# Patient Record
Sex: FEMALE | Race: WHITE | Employment: OTHER
[De-identification: names, ages, dates, MRNs, and addresses within clinical notes are randomized per-mention and may not be internally consistent; named-entity substitution may affect disease eponyms.]

---

## 2017-03-07 ENCOUNTER — OFFICE VISIT (OUTPATIENT)
Dept: GASTROENTEROLOGY | Facility: CLINIC | Age: 72
End: 2017-03-07

## 2017-03-07 VITALS
SYSTOLIC BLOOD PRESSURE: 109 MMHG | TEMPERATURE: 98.1 F | DIASTOLIC BLOOD PRESSURE: 56 MMHG | HEART RATE: 74 BPM | OXYGEN SATURATION: 100 %

## 2017-03-07 DIAGNOSIS — R19.5 GUAIAC POSITIVE STOOLS: ICD-10-CM

## 2017-03-07 DIAGNOSIS — R19.5 POSITIVE FIT (FECAL IMMUNOCHEMICAL TEST): Primary | ICD-10-CM

## 2017-03-07 PROCEDURE — G8400 PT W/DXA NO RESULTS DOC: HCPCS | Performed by: INTERNAL MEDICINE

## 2017-03-07 PROCEDURE — 3017F COLORECTAL CA SCREEN DOC REV: CPT | Performed by: INTERNAL MEDICINE

## 2017-03-07 PROCEDURE — 4040F PNEUMOC VAC/ADMIN/RCVD: CPT | Performed by: INTERNAL MEDICINE

## 2017-03-07 PROCEDURE — G8484 FLU IMMUNIZE NO ADMIN: HCPCS | Performed by: INTERNAL MEDICINE

## 2017-03-07 PROCEDURE — 1123F ACP DISCUSS/DSCN MKR DOCD: CPT | Performed by: INTERNAL MEDICINE

## 2017-03-07 PROCEDURE — G8421 BMI NOT CALCULATED: HCPCS | Performed by: INTERNAL MEDICINE

## 2017-03-07 PROCEDURE — 1090F PRES/ABSN URINE INCON ASSESS: CPT | Performed by: INTERNAL MEDICINE

## 2017-03-07 PROCEDURE — G8599 NO ASA/ANTIPLAT THER USE RNG: HCPCS | Performed by: INTERNAL MEDICINE

## 2017-03-07 PROCEDURE — 1036F TOBACCO NON-USER: CPT | Performed by: INTERNAL MEDICINE

## 2017-03-07 PROCEDURE — 99205 OFFICE O/P NEW HI 60 MIN: CPT | Performed by: INTERNAL MEDICINE

## 2017-03-07 PROCEDURE — 3014F SCREEN MAMMO DOC REV: CPT | Performed by: INTERNAL MEDICINE

## 2017-03-07 PROCEDURE — G8428 CUR MEDS NOT DOCUMENT: HCPCS | Performed by: INTERNAL MEDICINE

## 2017-03-07 ASSESSMENT — ENCOUNTER SYMPTOMS
BLOOD IN STOOL: 1
CONSTIPATION: 0
BACK PAIN: 0
RECTAL PAIN: 0
DIARRHEA: 0
SINUS PRESSURE: 1
ABDOMINAL DISTENTION: 0
ABDOMINAL PAIN: 0
ANAL BLEEDING: 0
SHORTNESS OF BREATH: 0
VOMITING: 0
NAUSEA: 0
CHOKING: 0
COUGH: 0
EYES NEGATIVE: 1

## 2017-03-17 ENCOUNTER — ANESTHESIA EVENT (OUTPATIENT)
Dept: OPERATING ROOM | Age: 72
End: 2017-03-17
Payer: MEDICARE

## 2017-03-20 ENCOUNTER — HOSPITAL ENCOUNTER (OUTPATIENT)
Age: 72
Setting detail: OUTPATIENT SURGERY
Discharge: HOME | End: 2017-03-20
Attending: INTERNAL MEDICINE | Admitting: INTERNAL MEDICINE
Payer: MEDICARE

## 2017-03-20 ENCOUNTER — ANESTHESIA (OUTPATIENT)
Dept: OPERATING ROOM | Age: 72
End: 2017-03-20
Payer: MEDICARE

## 2017-03-20 VITALS
BODY MASS INDEX: 50.41 KG/M2 | HEART RATE: 74 BPM | HEIGHT: 61 IN | SYSTOLIC BLOOD PRESSURE: 110 MMHG | WEIGHT: 267 LBS | DIASTOLIC BLOOD PRESSURE: 62 MMHG | RESPIRATION RATE: 18 BRPM | OXYGEN SATURATION: 100 % | TEMPERATURE: 97.5 F

## 2017-03-20 VITALS — SYSTOLIC BLOOD PRESSURE: 150 MMHG | DIASTOLIC BLOOD PRESSURE: 70 MMHG | OXYGEN SATURATION: 97 % | TEMPERATURE: 96.8 F

## 2017-03-20 LAB
ABSOLUTE EOS #: 0.3 K/UL (ref 0–0.4)
ABSOLUTE LYMPH #: 1.7 K/UL (ref 1–4.8)
ABSOLUTE MONO #: 0.5 K/UL (ref 0.1–1.3)
ANION GAP SERPL CALCULATED.3IONS-SCNC: 8 MMOL/L (ref 9–17)
BASOPHILS # BLD: 0 % (ref 0–2)
BASOPHILS ABSOLUTE: 0 K/UL (ref 0–0.2)
BUN BLDV-MCNC: 14 MG/DL (ref 8–23)
BUN/CREAT BLD: ABNORMAL (ref 9–20)
CALCIUM SERPL-MCNC: 9.6 MG/DL (ref 8.6–10.4)
CHLORIDE BLD-SCNC: 96 MMOL/L (ref 98–107)
CO2: 37 MMOL/L (ref 20–31)
CREAT SERPL-MCNC: 0.86 MG/DL (ref 0.5–0.9)
DIFFERENTIAL TYPE: ABNORMAL
EOSINOPHILS RELATIVE PERCENT: 3 % (ref 0–4)
GFR AFRICAN AMERICAN: >60 ML/MIN
GFR NON-AFRICAN AMERICAN: >60 ML/MIN
GFR SERPL CREATININE-BSD FRML MDRD: ABNORMAL ML/MIN/{1.73_M2}
GFR SERPL CREATININE-BSD FRML MDRD: ABNORMAL ML/MIN/{1.73_M2}
GLUCOSE BLD-MCNC: 107 MG/DL (ref 65–105)
GLUCOSE BLD-MCNC: 112 MG/DL (ref 70–99)
HCT VFR BLD CALC: 34.3 % (ref 36–46)
HEMOGLOBIN: 11.3 G/DL (ref 12–16)
LYMPHOCYTES # BLD: 16 % (ref 24–44)
MCH RBC QN AUTO: 28.3 PG (ref 26–34)
MCHC RBC AUTO-ENTMCNC: 33 G/DL (ref 31–37)
MCV RBC AUTO: 85.8 FL (ref 80–100)
MONOCYTES # BLD: 5 % (ref 1–7)
PDW BLD-RTO: 14.9 % (ref 11.5–14.9)
PLATELET # BLD: 244 K/UL (ref 150–450)
PLATELET ESTIMATE: ABNORMAL
PMV BLD AUTO: 7.7 FL (ref 6–12)
POTASSIUM SERPL-SCNC: 4 MMOL/L (ref 3.7–5.3)
RBC # BLD: 4 M/UL (ref 4–5.2)
RBC # BLD: ABNORMAL 10*6/UL
SEG NEUTROPHILS: 76 % (ref 36–66)
SEGMENTED NEUTROPHILS ABSOLUTE COUNT: 8.3 K/UL (ref 1.3–9.1)
SODIUM BLD-SCNC: 141 MMOL/L (ref 135–144)
WBC # BLD: 10.9 K/UL (ref 3.5–11)
WBC # BLD: ABNORMAL 10*3/UL

## 2017-03-20 PROCEDURE — 82947 ASSAY GLUCOSE BLOOD QUANT: CPT

## 2017-03-20 PROCEDURE — 80048 BASIC METABOLIC PNL TOTAL CA: CPT

## 2017-03-20 PROCEDURE — 7100000031 HC ASPR PHASE II RECOVERY - ADDTL 15 MIN: Performed by: INTERNAL MEDICINE

## 2017-03-20 PROCEDURE — 2500000003 HC RX 250 WO HCPCS: Performed by: NURSE ANESTHETIST, CERTIFIED REGISTERED

## 2017-03-20 PROCEDURE — 7100000000 HC PACU RECOVERY - FIRST 15 MIN: Performed by: INTERNAL MEDICINE

## 2017-03-20 PROCEDURE — 88305 TISSUE EXAM BY PATHOLOGIST: CPT

## 2017-03-20 PROCEDURE — 6360000002 HC RX W HCPCS: Performed by: NURSE ANESTHETIST, CERTIFIED REGISTERED

## 2017-03-20 PROCEDURE — 7100000030 HC ASPR PHASE II RECOVERY - FIRST 15 MIN: Performed by: INTERNAL MEDICINE

## 2017-03-20 PROCEDURE — 7100000001 HC PACU RECOVERY - ADDTL 15 MIN: Performed by: INTERNAL MEDICINE

## 2017-03-20 PROCEDURE — 3700000001 HC ADD 15 MINUTES (ANESTHESIA): Performed by: INTERNAL MEDICINE

## 2017-03-20 PROCEDURE — 2580000003 HC RX 258: Performed by: ANESTHESIOLOGY

## 2017-03-20 PROCEDURE — 3700000000 HC ANESTHESIA ATTENDED CARE: Performed by: INTERNAL MEDICINE

## 2017-03-20 PROCEDURE — 3609010300 HC COLONOSCOPY W/BIOPSY SINGLE/MULTIPLE: Performed by: INTERNAL MEDICINE

## 2017-03-20 PROCEDURE — 3609012400 HC EGD TRANSORAL BIOPSY SINGLE/MULTIPLE: Performed by: INTERNAL MEDICINE

## 2017-03-20 PROCEDURE — 85025 COMPLETE CBC W/AUTO DIFF WBC: CPT

## 2017-03-20 RX ORDER — LABETALOL HYDROCHLORIDE 5 MG/ML
5 INJECTION, SOLUTION INTRAVENOUS EVERY 10 MIN PRN
Status: DISCONTINUED | OUTPATIENT
Start: 2017-03-20 | End: 2017-03-20 | Stop reason: HOSPADM

## 2017-03-20 RX ORDER — LIDOCAINE HYDROCHLORIDE 10 MG/ML
INJECTION, SOLUTION EPIDURAL; INFILTRATION; INTRACAUDAL; PERINEURAL PRN
Status: DISCONTINUED | OUTPATIENT
Start: 2017-03-20 | End: 2017-03-20 | Stop reason: SDUPTHER

## 2017-03-20 RX ORDER — INSULIN GLARGINE 100 [IU]/ML
5 INJECTION, SOLUTION SUBCUTANEOUS DAILY
Status: ON HOLD | COMMUNITY
End: 2019-07-06 | Stop reason: HOSPADM

## 2017-03-20 RX ORDER — PROPOFOL 10 MG/ML
INJECTION, EMULSION INTRAVENOUS PRN
Status: DISCONTINUED | OUTPATIENT
Start: 2017-03-20 | End: 2017-03-20 | Stop reason: SDUPTHER

## 2017-03-20 RX ORDER — LOSARTAN POTASSIUM 25 MG/1
25 TABLET ORAL DAILY
Status: ON HOLD | COMMUNITY
End: 2019-07-06 | Stop reason: HOSPADM

## 2017-03-20 RX ORDER — SODIUM CHLORIDE 0.9 % (FLUSH) 0.9 %
10 SYRINGE (ML) INJECTION PRN
Status: DISCONTINUED | OUTPATIENT
Start: 2017-03-20 | End: 2017-03-20 | Stop reason: HOSPADM

## 2017-03-20 RX ORDER — DIPHENHYDRAMINE HYDROCHLORIDE 50 MG/ML
12.5 INJECTION INTRAMUSCULAR; INTRAVENOUS
Status: DISCONTINUED | OUTPATIENT
Start: 2017-03-20 | End: 2017-03-20 | Stop reason: HOSPADM

## 2017-03-20 RX ORDER — KETAMINE HYDROCHLORIDE 10 MG/ML
INJECTION, SOLUTION INTRAMUSCULAR; INTRAVENOUS PRN
Status: DISCONTINUED | OUTPATIENT
Start: 2017-03-20 | End: 2017-03-20 | Stop reason: SDUPTHER

## 2017-03-20 RX ORDER — SODIUM CHLORIDE, SODIUM LACTATE, POTASSIUM CHLORIDE, CALCIUM CHLORIDE 600; 310; 30; 20 MG/100ML; MG/100ML; MG/100ML; MG/100ML
INJECTION, SOLUTION INTRAVENOUS CONTINUOUS
Status: DISCONTINUED | OUTPATIENT
Start: 2017-03-20 | End: 2017-03-20 | Stop reason: HOSPADM

## 2017-03-20 RX ORDER — SODIUM CHLORIDE 0.9 % (FLUSH) 0.9 %
10 SYRINGE (ML) INJECTION EVERY 12 HOURS SCHEDULED
Status: DISCONTINUED | OUTPATIENT
Start: 2017-03-20 | End: 2017-03-20 | Stop reason: HOSPADM

## 2017-03-20 RX ORDER — INSULIN GLARGINE 100 [IU]/ML
20 INJECTION, SOLUTION SUBCUTANEOUS DAILY
Status: ON HOLD | COMMUNITY
End: 2020-01-26 | Stop reason: HOSPADM

## 2017-03-20 RX ORDER — ONDANSETRON 2 MG/ML
4 INJECTION INTRAMUSCULAR; INTRAVENOUS
Status: DISCONTINUED | OUTPATIENT
Start: 2017-03-20 | End: 2017-03-20 | Stop reason: HOSPADM

## 2017-03-20 RX ADMIN — PROPOFOL 30 MG: 10 INJECTION, EMULSION INTRAVENOUS at 09:01

## 2017-03-20 RX ADMIN — PROPOFOL 20 MG: 10 INJECTION, EMULSION INTRAVENOUS at 09:17

## 2017-03-20 RX ADMIN — PROPOFOL 30 MG: 10 INJECTION, EMULSION INTRAVENOUS at 09:05

## 2017-03-20 RX ADMIN — KETAMINE HYDROCHLORIDE 30 MG: 10 INJECTION, SOLUTION INTRAMUSCULAR; INTRAVENOUS at 09:01

## 2017-03-20 RX ADMIN — SODIUM CHLORIDE, POTASSIUM CHLORIDE, SODIUM LACTATE AND CALCIUM CHLORIDE: 600; 310; 30; 20 INJECTION, SOLUTION INTRAVENOUS at 08:51

## 2017-03-20 RX ADMIN — PROPOFOL 30 MG: 10 INJECTION, EMULSION INTRAVENOUS at 09:10

## 2017-03-20 RX ADMIN — PROPOFOL 20 MG: 10 INJECTION, EMULSION INTRAVENOUS at 09:20

## 2017-03-20 RX ADMIN — LIDOCAINE HYDROCHLORIDE 50 MG: 10 INJECTION, SOLUTION EPIDURAL; INFILTRATION; INTRACAUDAL; PERINEURAL at 09:01

## 2017-03-20 RX ADMIN — PROPOFOL 20 MG: 10 INJECTION, EMULSION INTRAVENOUS at 09:13

## 2017-03-20 ASSESSMENT — PAIN SCALES - GENERAL: PAINLEVEL_OUTOF10: 0

## 2017-03-20 ASSESSMENT — PAIN - FUNCTIONAL ASSESSMENT: PAIN_FUNCTIONAL_ASSESSMENT: 0-10

## 2017-03-20 ASSESSMENT — COPD QUESTIONNAIRES: CAT_SEVERITY: MODERATE

## 2017-03-21 LAB — SURGICAL PATHOLOGY REPORT: NORMAL

## 2017-08-30 ENCOUNTER — HOSPITAL ENCOUNTER (EMERGENCY)
Age: 72
Discharge: HOME | End: 2017-08-30
Attending: EMERGENCY MEDICINE
Payer: MEDICARE

## 2017-08-30 VITALS
RESPIRATION RATE: 14 BRPM | WEIGHT: 272 LBS | HEIGHT: 62 IN | BODY MASS INDEX: 50.05 KG/M2 | DIASTOLIC BLOOD PRESSURE: 79 MMHG | TEMPERATURE: 98.2 F | HEART RATE: 71 BPM | OXYGEN SATURATION: 100 % | SYSTOLIC BLOOD PRESSURE: 115 MMHG

## 2017-08-30 DIAGNOSIS — R68.89 ABNORMAL VITAL SIGNS: Primary | ICD-10-CM

## 2017-08-30 LAB
CHP ED QC CHECK: NORMAL
GLUCOSE BLD-MCNC: 75 MG/DL
GLUCOSE BLD-MCNC: 75 MG/DL (ref 65–105)

## 2017-08-30 PROCEDURE — 99285 EMERGENCY DEPT VISIT HI MDM: CPT

## 2017-08-30 PROCEDURE — 82947 ASSAY GLUCOSE BLOOD QUANT: CPT

## 2019-06-27 ENCOUNTER — APPOINTMENT (OUTPATIENT)
Dept: CT IMAGING | Age: 74
DRG: 193 | End: 2019-06-27
Payer: MEDICARE

## 2019-06-27 ENCOUNTER — HOSPITAL ENCOUNTER (INPATIENT)
Age: 74
LOS: 8 days | Discharge: NURSING, REHAB OR LONG-TERM CARE | DRG: 193 | End: 2019-07-06
Attending: EMERGENCY MEDICINE | Admitting: INTERNAL MEDICINE
Payer: MEDICARE

## 2019-06-27 ENCOUNTER — APPOINTMENT (OUTPATIENT)
Dept: GENERAL RADIOLOGY | Age: 74
DRG: 193 | End: 2019-06-27
Payer: MEDICARE

## 2019-06-27 DIAGNOSIS — M19.90 ARTHRITIS: ICD-10-CM

## 2019-06-27 DIAGNOSIS — R06.00 DYSPNEA AND RESPIRATORY ABNORMALITIES: Primary | ICD-10-CM

## 2019-06-27 DIAGNOSIS — R06.89 DYSPNEA AND RESPIRATORY ABNORMALITIES: Primary | ICD-10-CM

## 2019-06-27 LAB
ABSOLUTE EOS #: 0 K/UL (ref 0–0.4)
ABSOLUTE IMMATURE GRANULOCYTE: ABNORMAL K/UL (ref 0–0.3)
ABSOLUTE LYMPH #: 1.83 K/UL (ref 1–4.8)
ABSOLUTE MONO #: 0.41 K/UL (ref 0.1–1.3)
ALBUMIN SERPL-MCNC: 3.1 G/DL (ref 3.5–5.2)
ALBUMIN/GLOBULIN RATIO: ABNORMAL (ref 1–2.5)
ALP BLD-CCNC: 162 U/L (ref 35–104)
ALT SERPL-CCNC: 12 U/L (ref 5–33)
ANION GAP SERPL CALCULATED.3IONS-SCNC: 11 MMOL/L (ref 9–17)
AST SERPL-CCNC: 19 U/L
BASOPHILS # BLD: 1 % (ref 0–2)
BASOPHILS ABSOLUTE: 0.2 K/UL (ref 0–0.2)
BILIRUB SERPL-MCNC: 0.51 MG/DL (ref 0.3–1.2)
BNP INTERPRETATION: ABNORMAL
BUN BLDV-MCNC: 31 MG/DL (ref 8–23)
BUN/CREAT BLD: ABNORMAL (ref 9–20)
CALCIUM SERPL-MCNC: 9.5 MG/DL (ref 8.6–10.4)
CHLORIDE BLD-SCNC: 94 MMOL/L (ref 98–107)
CO2: 30 MMOL/L (ref 20–31)
CREAT SERPL-MCNC: 1.83 MG/DL (ref 0.5–0.9)
DIFFERENTIAL TYPE: ABNORMAL
EOSINOPHILS RELATIVE PERCENT: 0 % (ref 0–4)
GFR AFRICAN AMERICAN: 33 ML/MIN
GFR NON-AFRICAN AMERICAN: 27 ML/MIN
GFR SERPL CREATININE-BSD FRML MDRD: ABNORMAL ML/MIN/{1.73_M2}
GFR SERPL CREATININE-BSD FRML MDRD: ABNORMAL ML/MIN/{1.73_M2}
GLUCOSE BLD-MCNC: 127 MG/DL (ref 70–99)
HCT VFR BLD CALC: 34.5 % (ref 36–46)
HEMOGLOBIN: 11 G/DL (ref 12–16)
IMMATURE GRANULOCYTES: ABNORMAL %
INR BLD: 1.1
LACTIC ACID, SEPSIS WHOLE BLOOD: NORMAL MMOL/L (ref 0.5–1.9)
LACTIC ACID, SEPSIS: 1.2 MMOL/L (ref 0.5–1.9)
LYMPHOCYTES # BLD: 9 % (ref 24–44)
MCH RBC QN AUTO: 29.1 PG (ref 26–34)
MCHC RBC AUTO-ENTMCNC: 31.8 G/DL (ref 31–37)
MCV RBC AUTO: 91.6 FL (ref 80–100)
MONOCYTES # BLD: 2 % (ref 1–7)
MORPHOLOGY: NORMAL
NRBC AUTOMATED: ABNORMAL PER 100 WBC
PARTIAL THROMBOPLASTIN TIME: 39.5 SEC (ref 24–36)
PDW BLD-RTO: 14.7 % (ref 11.5–14.9)
PLATELET # BLD: 239 K/UL (ref 150–450)
PLATELET ESTIMATE: ABNORMAL
PMV BLD AUTO: 8.2 FL (ref 6–12)
POTASSIUM SERPL-SCNC: 4.8 MMOL/L (ref 3.7–5.3)
PRO-BNP: 1062 PG/ML
PROTHROMBIN TIME: 14 SEC (ref 11.8–14.6)
RBC # BLD: 3.77 M/UL (ref 4–5.2)
RBC # BLD: ABNORMAL 10*6/UL
SEG NEUTROPHILS: 88 % (ref 36–66)
SEGMENTED NEUTROPHILS ABSOLUTE COUNT: 17.86 K/UL (ref 1.3–9.1)
SODIUM BLD-SCNC: 135 MMOL/L (ref 135–144)
TOTAL PROTEIN: 7.3 G/DL (ref 6.4–8.3)
TROPONIN INTERP: ABNORMAL
TROPONIN T: ABNORMAL NG/ML
TROPONIN, HIGH SENSITIVITY: 21 NG/L (ref 0–14)
WBC # BLD: 20.3 K/UL (ref 3.5–11)
WBC # BLD: ABNORMAL 10*3/UL

## 2019-06-27 PROCEDURE — 2700000000 HC OXYGEN THERAPY PER DAY

## 2019-06-27 PROCEDURE — 83605 ASSAY OF LACTIC ACID: CPT

## 2019-06-27 PROCEDURE — 36415 COLL VENOUS BLD VENIPUNCTURE: CPT

## 2019-06-27 PROCEDURE — 83880 ASSAY OF NATRIURETIC PEPTIDE: CPT

## 2019-06-27 PROCEDURE — 84484 ASSAY OF TROPONIN QUANT: CPT

## 2019-06-27 PROCEDURE — 96366 THER/PROPH/DIAG IV INF ADDON: CPT

## 2019-06-27 PROCEDURE — 80053 COMPREHEN METABOLIC PANEL: CPT

## 2019-06-27 PROCEDURE — 85025 COMPLETE CBC W/AUTO DIFF WBC: CPT

## 2019-06-27 PROCEDURE — 93005 ELECTROCARDIOGRAM TRACING: CPT | Performed by: EMERGENCY MEDICINE

## 2019-06-27 PROCEDURE — 2500000003 HC RX 250 WO HCPCS: Performed by: EMERGENCY MEDICINE

## 2019-06-27 PROCEDURE — 94640 AIRWAY INHALATION TREATMENT: CPT

## 2019-06-27 PROCEDURE — 71250 CT THORAX DX C-: CPT

## 2019-06-27 PROCEDURE — 6370000000 HC RX 637 (ALT 250 FOR IP): Performed by: EMERGENCY MEDICINE

## 2019-06-27 PROCEDURE — 94664 DEMO&/EVAL PT USE INHALER: CPT

## 2019-06-27 PROCEDURE — 87641 MR-STAPH DNA AMP PROBE: CPT

## 2019-06-27 PROCEDURE — 96365 THER/PROPH/DIAG IV INF INIT: CPT

## 2019-06-27 PROCEDURE — 6360000002 HC RX W HCPCS: Performed by: EMERGENCY MEDICINE

## 2019-06-27 PROCEDURE — 99285 EMERGENCY DEPT VISIT HI MDM: CPT

## 2019-06-27 PROCEDURE — 96368 THER/DIAG CONCURRENT INF: CPT

## 2019-06-27 PROCEDURE — 85730 THROMBOPLASTIN TIME PARTIAL: CPT

## 2019-06-27 PROCEDURE — 2580000003 HC RX 258: Performed by: EMERGENCY MEDICINE

## 2019-06-27 PROCEDURE — 87040 BLOOD CULTURE FOR BACTERIA: CPT

## 2019-06-27 PROCEDURE — 94760 N-INVAS EAR/PLS OXIMETRY 1: CPT

## 2019-06-27 PROCEDURE — 71045 X-RAY EXAM CHEST 1 VIEW: CPT

## 2019-06-27 PROCEDURE — 85610 PROTHROMBIN TIME: CPT

## 2019-06-27 RX ORDER — 0.9 % SODIUM CHLORIDE 0.9 %
1000 INTRAVENOUS SOLUTION INTRAVENOUS ONCE
Status: COMPLETED | OUTPATIENT
Start: 2019-06-27 | End: 2019-06-27

## 2019-06-27 RX ORDER — IPRATROPIUM BROMIDE AND ALBUTEROL SULFATE 2.5; .5 MG/3ML; MG/3ML
3 SOLUTION RESPIRATORY (INHALATION) ONCE
Status: COMPLETED | OUTPATIENT
Start: 2019-06-27 | End: 2019-06-27

## 2019-06-27 RX ADMIN — Medication 1750 MG: at 23:45

## 2019-06-27 RX ADMIN — IPRATROPIUM BROMIDE AND ALBUTEROL SULFATE 3 AMPULE: .5; 3 SOLUTION RESPIRATORY (INHALATION) at 21:25

## 2019-06-27 RX ADMIN — PIPERACILLIN SODIUM AND TAZOBACTAM SODIUM 3.38 G: 3; .375 INJECTION, POWDER, LYOPHILIZED, FOR SOLUTION INTRAVENOUS at 23:25

## 2019-06-27 RX ADMIN — SODIUM CHLORIDE 1000 ML: 9 INJECTION, SOLUTION INTRAVENOUS at 21:45

## 2019-06-27 NOTE — LETTER
suppliers that help patients recover after discharge from the hospital, including skilled nursing facilities, home health agencies, inpatient rehabilitation facilities, and long term care hospitals. [address] [place] [address]  is working closely with the doctors and other health care providers that care for you during and following your hospital stay and for a period of time after you leave the hospital. By working together, the health care providers are trying to more efficiently provide well-managed, high quality, patient-centered care as you undergo treatment. Hospitals, doctors, and other health care providers that care for you following a hospital stay may receive an additional payment for providing better, more coordinated health care. Medicare will monitor your care to make sure you and others get high quality care. Your feedback is important     Medicare may also ask you to answer a survey about the services and care you received from [address] [place], [place] [address]set [place] will be mailed to you. Your feedback will improve care for all people with Medicare who receive care from [address] [place] [address] . Completion of this survey is optional.     Get more information     For more information about the Bundled Payments for [address] [place] [address], you can:    · Visit the CMS BPCI Advanced Website at http://goodrich-hartley.net/ initiatives/bpci-advanced   · Call the [facility] BPCI-A team at (921) 597-8365. · Call 1-800-MEDICARE (9-328.982.4903). TTY users can call 1-460.508.9183     If you have concerns or complaints about your care, talk to your health care provider, or contact your Beneficiary and Family Centered Quality Improvement Organization [employer] [facility]). To get your CC-QIO's phone number, visit Medicare.gov/contacts or call 1-800-MEDICARE. · To find a different hospital, visit www. hospitalcompare.[facility].gov or call 1-800- MEDICARE (5-757.808.1510). TTY users should call 1-987.420.8718. · To find a different doctor, visit Medicare's Physician Compare website, [employer], or call 1-800-MEDICARE (424 6061). TTY users should call 3-298.417.1330. · To find a different skilled nursing facility, visit MetaNotes website, https://www.PR Slides/, or call 1-800-MEDICARE (1- 818.193.5559). TTY users should call 0-379.212.2524. · To find a different long term care hospital, visit [facility] [address] Compare website, [employer], or call 1-800- MEDICARE (745 8076). TTY users should call 3-902.726.9386. · To find a different inpatient rehabilitation facility, visit [facility] Compare website, www.medicare.gov/ inpatientrehabilitation facilitycompare, or call 1-800-MEDICARE (0-101.631.1293). TTY users should call 1- 528.726.4873. · To find a different home health agency, visit [address] website, www.medicare.gov/homehealthcompare, or call 1-800-MEDICARE (9-397- 245-7482). TTY users should call [phone].

## 2019-06-28 PROBLEM — R77.8 ELEVATED TROPONIN: Status: ACTIVE | Noted: 2019-06-28

## 2019-06-28 PROBLEM — R79.89 ELEVATED TROPONIN: Status: ACTIVE | Noted: 2019-06-28

## 2019-06-28 PROBLEM — N39.0 UTI (URINARY TRACT INFECTION): Status: ACTIVE | Noted: 2019-06-28

## 2019-06-28 PROBLEM — J18.9 HCAP (HEALTHCARE-ASSOCIATED PNEUMONIA): Status: ACTIVE | Noted: 2019-06-28

## 2019-06-28 LAB
-: ABNORMAL
AMORPHOUS: ABNORMAL
ANION GAP SERPL CALCULATED.3IONS-SCNC: 11 MMOL/L (ref 9–17)
BACTERIA: ABNORMAL
BILIRUBIN URINE: NEGATIVE
BUN BLDV-MCNC: 34 MG/DL (ref 8–23)
BUN/CREAT BLD: ABNORMAL (ref 9–20)
CALCIUM SERPL-MCNC: 8.5 MG/DL (ref 8.6–10.4)
CASTS UA: ABNORMAL /LPF
CASTS UA: ABNORMAL /LPF
CHLORIDE BLD-SCNC: 96 MMOL/L (ref 98–107)
CO2: 28 MMOL/L (ref 20–31)
COLOR: YELLOW
COMMENT UA: ABNORMAL
CREAT SERPL-MCNC: 2.18 MG/DL (ref 0.5–0.9)
CRYSTALS, UA: ABNORMAL /HPF
EKG ATRIAL RATE: 80 BPM
EKG P AXIS: 37 DEGREES
EKG P-R INTERVAL: 146 MS
EKG Q-T INTERVAL: 370 MS
EKG QRS DURATION: 112 MS
EKG QTC CALCULATION (BAZETT): 426 MS
EKG R AXIS: 87 DEGREES
EKG T AXIS: 49 DEGREES
EKG VENTRICULAR RATE: 80 BPM
EPITHELIAL CELLS UA: ABNORMAL /HPF
GFR AFRICAN AMERICAN: 27 ML/MIN
GFR NON-AFRICAN AMERICAN: 22 ML/MIN
GFR SERPL CREATININE-BSD FRML MDRD: ABNORMAL ML/MIN/{1.73_M2}
GFR SERPL CREATININE-BSD FRML MDRD: ABNORMAL ML/MIN/{1.73_M2}
GLUCOSE BLD-MCNC: 143 MG/DL (ref 65–105)
GLUCOSE BLD-MCNC: 159 MG/DL (ref 70–99)
GLUCOSE BLD-MCNC: 242 MG/DL (ref 65–105)
GLUCOSE BLD-MCNC: 329 MG/DL (ref 65–105)
GLUCOSE BLD-MCNC: 332 MG/DL (ref 65–105)
GLUCOSE URINE: NEGATIVE
HCT VFR BLD CALC: 29.1 % (ref 36–46)
HEMOGLOBIN: 9.4 G/DL (ref 12–16)
KETONES, URINE: NEGATIVE
LACTIC ACID, SEPSIS WHOLE BLOOD: NORMAL MMOL/L (ref 0.5–1.9)
LACTIC ACID, SEPSIS: 1.4 MMOL/L (ref 0.5–1.9)
LEUKOCYTE ESTERASE, URINE: ABNORMAL
MCH RBC QN AUTO: 29.7 PG (ref 26–34)
MCHC RBC AUTO-ENTMCNC: 32.4 G/DL (ref 31–37)
MCV RBC AUTO: 91.7 FL (ref 80–100)
MRSA, DNA, NASAL: ABNORMAL
MUCUS: ABNORMAL
NITRITE, URINE: NEGATIVE
NRBC AUTOMATED: ABNORMAL PER 100 WBC
OTHER OBSERVATIONS UA: ABNORMAL
PDW BLD-RTO: 14.8 % (ref 11.5–14.9)
PH UA: 5 (ref 5–8)
PLATELET # BLD: 214 K/UL (ref 150–450)
PMV BLD AUTO: 8.6 FL (ref 6–12)
POTASSIUM SERPL-SCNC: 4.7 MMOL/L (ref 3.7–5.3)
PROCALCITONIN: 0.69 NG/ML
PROTEIN UA: ABNORMAL
RBC # BLD: 3.17 M/UL (ref 4–5.2)
RBC UA: ABNORMAL /HPF
RENAL EPITHELIAL, UA: ABNORMAL /HPF
SODIUM BLD-SCNC: 135 MMOL/L (ref 135–144)
SPECIFIC GRAVITY UA: 1.02 (ref 1–1.03)
SPECIMEN DESCRIPTION: ABNORMAL
TRICHOMONAS: ABNORMAL
TROPONIN INTERP: ABNORMAL
TROPONIN INTERP: ABNORMAL
TROPONIN T: ABNORMAL NG/ML
TROPONIN T: ABNORMAL NG/ML
TROPONIN, HIGH SENSITIVITY: 24 NG/L (ref 0–14)
TROPONIN, HIGH SENSITIVITY: 25 NG/L (ref 0–14)
TURBIDITY: ABNORMAL
URINE HGB: NEGATIVE
UROBILINOGEN, URINE: NORMAL
WBC # BLD: 13 K/UL (ref 3.5–11)
WBC UA: ABNORMAL /HPF
YEAST: ABNORMAL

## 2019-06-28 PROCEDURE — 6360000002 HC RX W HCPCS: Performed by: INTERNAL MEDICINE

## 2019-06-28 PROCEDURE — 36415 COLL VENOUS BLD VENIPUNCTURE: CPT

## 2019-06-28 PROCEDURE — 6370000000 HC RX 637 (ALT 250 FOR IP): Performed by: EMERGENCY MEDICINE

## 2019-06-28 PROCEDURE — 6370000000 HC RX 637 (ALT 250 FOR IP): Performed by: INTERNAL MEDICINE

## 2019-06-28 PROCEDURE — 80048 BASIC METABOLIC PNL TOTAL CA: CPT

## 2019-06-28 PROCEDURE — 6360000002 HC RX W HCPCS: Performed by: NURSE PRACTITIONER

## 2019-06-28 PROCEDURE — 96366 THER/PROPH/DIAG IV INF ADDON: CPT

## 2019-06-28 PROCEDURE — 85027 COMPLETE CBC AUTOMATED: CPT

## 2019-06-28 PROCEDURE — 82947 ASSAY GLUCOSE BLOOD QUANT: CPT

## 2019-06-28 PROCEDURE — 87086 URINE CULTURE/COLONY COUNT: CPT

## 2019-06-28 PROCEDURE — 99223 1ST HOSP IP/OBS HIGH 75: CPT | Performed by: INTERNAL MEDICINE

## 2019-06-28 PROCEDURE — 84484 ASSAY OF TROPONIN QUANT: CPT

## 2019-06-28 PROCEDURE — 6370000000 HC RX 637 (ALT 250 FOR IP): Performed by: NURSE PRACTITIONER

## 2019-06-28 PROCEDURE — 97530 THERAPEUTIC ACTIVITIES: CPT

## 2019-06-28 PROCEDURE — 2500000003 HC RX 250 WO HCPCS: Performed by: EMERGENCY MEDICINE

## 2019-06-28 PROCEDURE — 81001 URINALYSIS AUTO W/SCOPE: CPT

## 2019-06-28 PROCEDURE — 2580000003 HC RX 258: Performed by: NURSE PRACTITIONER

## 2019-06-28 PROCEDURE — 97162 PT EVAL MOD COMPLEX 30 MIN: CPT

## 2019-06-28 PROCEDURE — 93010 ELECTROCARDIOGRAM REPORT: CPT | Performed by: INTERNAL MEDICINE

## 2019-06-28 PROCEDURE — 2060000000 HC ICU INTERMEDIATE R&B

## 2019-06-28 PROCEDURE — 84145 PROCALCITONIN (PCT): CPT

## 2019-06-28 RX ORDER — FLUTICASONE PROPIONATE 50 MCG
2 SPRAY, SUSPENSION (ML) NASAL DAILY
Status: DISCONTINUED | OUTPATIENT
Start: 2019-06-28 | End: 2019-07-06 | Stop reason: HOSPADM

## 2019-06-28 RX ORDER — LEVOTHYROXINE SODIUM 0.1 MG/1
100 TABLET ORAL DAILY
Status: DISCONTINUED | OUTPATIENT
Start: 2019-06-28 | End: 2019-07-06 | Stop reason: HOSPADM

## 2019-06-28 RX ORDER — OMEPRAZOLE 20 MG/1
20 CAPSULE, DELAYED RELEASE ORAL DAILY
Status: ON HOLD | COMMUNITY
End: 2019-07-06 | Stop reason: HOSPADM

## 2019-06-28 RX ORDER — POTASSIUM CHLORIDE 750 MG/1
10 CAPSULE, EXTENDED RELEASE ORAL 2 TIMES DAILY
Status: ON HOLD | COMMUNITY
End: 2019-07-06 | Stop reason: HOSPADM

## 2019-06-28 RX ORDER — ATORVASTATIN CALCIUM 20 MG/1
20 TABLET, FILM COATED ORAL DAILY
Status: DISCONTINUED | OUTPATIENT
Start: 2019-06-28 | End: 2019-07-06 | Stop reason: HOSPADM

## 2019-06-28 RX ORDER — IPRATROPIUM BROMIDE AND ALBUTEROL SULFATE 2.5; .5 MG/3ML; MG/3ML
1 SOLUTION RESPIRATORY (INHALATION) EVERY 4 HOURS PRN
Status: DISCONTINUED | OUTPATIENT
Start: 2019-06-28 | End: 2019-07-05

## 2019-06-28 RX ORDER — BENZONATATE 100 MG/1
100 CAPSULE ORAL EVERY 8 HOURS PRN
Status: ON HOLD | COMMUNITY
End: 2019-07-05 | Stop reason: HOSPADM

## 2019-06-28 RX ORDER — ONDANSETRON 2 MG/ML
4 INJECTION INTRAMUSCULAR; INTRAVENOUS EVERY 6 HOURS PRN
Status: DISCONTINUED | OUTPATIENT
Start: 2019-06-28 | End: 2019-07-06 | Stop reason: HOSPADM

## 2019-06-28 RX ORDER — FERROUS SULFATE 325(65) MG
325 TABLET ORAL 2 TIMES DAILY
Status: DISCONTINUED | OUTPATIENT
Start: 2019-06-28 | End: 2019-07-06 | Stop reason: HOSPADM

## 2019-06-28 RX ORDER — RANITIDINE 150 MG/1
150 TABLET ORAL DAILY
COMMUNITY
End: 2020-01-22

## 2019-06-28 RX ORDER — NICOTINE 21 MG/24HR
1 PATCH, TRANSDERMAL 24 HOURS TRANSDERMAL DAILY PRN
Status: DISCONTINUED | OUTPATIENT
Start: 2019-06-28 | End: 2019-07-05

## 2019-06-28 RX ORDER — SODIUM CHLORIDE 0.9 % (FLUSH) 0.9 %
10 SYRINGE (ML) INJECTION PRN
Status: DISCONTINUED | OUTPATIENT
Start: 2019-06-28 | End: 2019-07-06 | Stop reason: HOSPADM

## 2019-06-28 RX ORDER — LEVETIRACETAM 500 MG/1
500 TABLET ORAL 2 TIMES DAILY
Status: DISCONTINUED | OUTPATIENT
Start: 2019-06-28 | End: 2019-07-06 | Stop reason: HOSPADM

## 2019-06-28 RX ORDER — FERROUS SULFATE 325(65) MG
325 TABLET ORAL 2 TIMES DAILY
COMMUNITY

## 2019-06-28 RX ORDER — BENZONATATE 100 MG/1
100 CAPSULE ORAL EVERY 8 HOURS PRN
Status: DISCONTINUED | OUTPATIENT
Start: 2019-06-28 | End: 2019-07-05

## 2019-06-28 RX ORDER — IPRATROPIUM BROMIDE AND ALBUTEROL SULFATE 2.5; .5 MG/3ML; MG/3ML
1 SOLUTION RESPIRATORY (INHALATION) EVERY 4 HOURS PRN
Status: ON HOLD | COMMUNITY
End: 2019-07-05 | Stop reason: HOSPADM

## 2019-06-28 RX ORDER — LOPERAMIDE HYDROCHLORIDE 2 MG/1
2 CAPSULE ORAL EVERY 6 HOURS PRN
COMMUNITY

## 2019-06-28 RX ORDER — GABAPENTIN 300 MG/1
300 CAPSULE ORAL 3 TIMES DAILY
Status: DISCONTINUED | OUTPATIENT
Start: 2019-06-28 | End: 2019-07-06 | Stop reason: HOSPADM

## 2019-06-28 RX ORDER — METOPROLOL TARTRATE 50 MG/1
50 TABLET, FILM COATED ORAL 2 TIMES DAILY
Status: DISCONTINUED | OUTPATIENT
Start: 2019-06-28 | End: 2019-07-06 | Stop reason: HOSPADM

## 2019-06-28 RX ORDER — SODIUM CHLORIDE 9 MG/ML
INJECTION, SOLUTION INTRAVENOUS CONTINUOUS
Status: DISCONTINUED | OUTPATIENT
Start: 2019-06-28 | End: 2019-07-05

## 2019-06-28 RX ORDER — GUAIFENESIN 600 MG/1
600 TABLET, EXTENDED RELEASE ORAL 2 TIMES DAILY
Status: DISCONTINUED | OUTPATIENT
Start: 2019-06-28 | End: 2019-07-06 | Stop reason: HOSPADM

## 2019-06-28 RX ORDER — PREDNISONE 20 MG/1
40 TABLET ORAL DAILY
Status: DISCONTINUED | OUTPATIENT
Start: 2019-06-28 | End: 2019-07-01

## 2019-06-28 RX ORDER — ACETAMINOPHEN 325 MG/1
650 TABLET ORAL EVERY 4 HOURS PRN
Status: DISCONTINUED | OUTPATIENT
Start: 2019-06-28 | End: 2019-07-06 | Stop reason: HOSPADM

## 2019-06-28 RX ORDER — DOCUSATE SODIUM 100 MG/1
100 CAPSULE, LIQUID FILLED ORAL 2 TIMES DAILY PRN
Status: DISCONTINUED | OUTPATIENT
Start: 2019-06-28 | End: 2019-07-06 | Stop reason: HOSPADM

## 2019-06-28 RX ORDER — HYDROCODONE BITARTRATE AND ACETAMINOPHEN 5; 325 MG/1; MG/1
1 TABLET ORAL 2 TIMES DAILY
Status: ON HOLD | COMMUNITY
End: 2019-07-06 | Stop reason: SDUPTHER

## 2019-06-28 RX ORDER — CETIRIZINE HYDROCHLORIDE 10 MG/1
5 TABLET ORAL DAILY
Status: DISCONTINUED | OUTPATIENT
Start: 2019-06-28 | End: 2019-07-06 | Stop reason: HOSPADM

## 2019-06-28 RX ORDER — GUAIFENESIN 100 MG/5ML
200 SYRUP ORAL EVERY 8 HOURS PRN
Status: ON HOLD | COMMUNITY
End: 2019-07-05 | Stop reason: HOSPADM

## 2019-06-28 RX ORDER — POTASSIUM CHLORIDE 750 MG/1
10 CAPSULE, EXTENDED RELEASE ORAL 2 TIMES DAILY
Status: DISCONTINUED | OUTPATIENT
Start: 2019-06-28 | End: 2019-06-29

## 2019-06-28 RX ORDER — SODIUM CHLORIDE 0.9 % (FLUSH) 0.9 %
10 SYRINGE (ML) INJECTION EVERY 12 HOURS SCHEDULED
Status: DISCONTINUED | OUTPATIENT
Start: 2019-06-28 | End: 2019-07-06 | Stop reason: HOSPADM

## 2019-06-28 RX ORDER — DEXTROSE MONOHYDRATE 25 G/50ML
12.5 INJECTION, SOLUTION INTRAVENOUS PRN
Status: DISCONTINUED | OUTPATIENT
Start: 2019-06-28 | End: 2019-07-06 | Stop reason: HOSPADM

## 2019-06-28 RX ORDER — HYDROCODONE BITARTRATE AND ACETAMINOPHEN 5; 325 MG/1; MG/1
1 TABLET ORAL 2 TIMES DAILY
Status: DISCONTINUED | OUTPATIENT
Start: 2019-06-28 | End: 2019-07-06 | Stop reason: HOSPADM

## 2019-06-28 RX ORDER — PANTOPRAZOLE SODIUM 40 MG/1
40 TABLET, DELAYED RELEASE ORAL
Status: DISCONTINUED | OUTPATIENT
Start: 2019-06-28 | End: 2019-07-06 | Stop reason: HOSPADM

## 2019-06-28 RX ORDER — MECLIZINE HYDROCHLORIDE 25 MG/1
25 TABLET ORAL EVERY 8 HOURS PRN
Status: ON HOLD | COMMUNITY
End: 2019-07-06 | Stop reason: HOSPADM

## 2019-06-28 RX ORDER — GUAIFENESIN 600 MG/1
600 TABLET, EXTENDED RELEASE ORAL EVERY 12 HOURS PRN
Status: ON HOLD | COMMUNITY
End: 2019-07-05 | Stop reason: HOSPADM

## 2019-06-28 RX ORDER — METHYLPREDNISOLONE SODIUM SUCCINATE 125 MG/2ML
60 INJECTION, POWDER, LYOPHILIZED, FOR SOLUTION INTRAMUSCULAR; INTRAVENOUS EVERY 6 HOURS
Status: DISCONTINUED | OUTPATIENT
Start: 2019-06-28 | End: 2019-06-28

## 2019-06-28 RX ORDER — LOPERAMIDE HYDROCHLORIDE 2 MG/1
2 CAPSULE ORAL EVERY 6 HOURS PRN
Status: DISCONTINUED | OUTPATIENT
Start: 2019-06-28 | End: 2019-07-06 | Stop reason: HOSPADM

## 2019-06-28 RX ORDER — ASPIRIN 81 MG/1
324 TABLET, CHEWABLE ORAL ONCE
Status: COMPLETED | OUTPATIENT
Start: 2019-06-28 | End: 2019-06-28

## 2019-06-28 RX ORDER — CITALOPRAM 20 MG/1
20 TABLET ORAL DAILY
Status: DISCONTINUED | OUTPATIENT
Start: 2019-06-28 | End: 2019-07-06 | Stop reason: HOSPADM

## 2019-06-28 RX ORDER — DEXTROSE MONOHYDRATE 50 MG/ML
100 INJECTION, SOLUTION INTRAVENOUS PRN
Status: DISCONTINUED | OUTPATIENT
Start: 2019-06-28 | End: 2019-07-06 | Stop reason: HOSPADM

## 2019-06-28 RX ORDER — INSULIN GLARGINE 100 [IU]/ML
5 INJECTION, SOLUTION SUBCUTANEOUS NIGHTLY
Status: DISCONTINUED | OUTPATIENT
Start: 2019-06-28 | End: 2019-07-06 | Stop reason: HOSPADM

## 2019-06-28 RX ORDER — LOSARTAN POTASSIUM 25 MG/1
25 TABLET ORAL DAILY
Status: DISCONTINUED | OUTPATIENT
Start: 2019-06-28 | End: 2019-06-28

## 2019-06-28 RX ORDER — NICOTINE POLACRILEX 4 MG
15 LOZENGE BUCCAL PRN
Status: DISCONTINUED | OUTPATIENT
Start: 2019-06-28 | End: 2019-07-06 | Stop reason: HOSPADM

## 2019-06-28 RX ORDER — INSULIN GLARGINE 100 [IU]/ML
20 INJECTION, SOLUTION SUBCUTANEOUS DAILY
Status: DISCONTINUED | OUTPATIENT
Start: 2019-06-28 | End: 2019-07-06 | Stop reason: HOSPADM

## 2019-06-28 RX ORDER — AMILORIDE HCL 5 MG
10 TABLET ORAL EVERY 8 HOURS PRN
Status: ON HOLD | COMMUNITY
End: 2019-07-05 | Stop reason: HOSPADM

## 2019-06-28 RX ADMIN — CETIRIZINE HYDROCHLORIDE 5 MG: 10 TABLET, FILM COATED ORAL at 10:33

## 2019-06-28 RX ADMIN — SODIUM CHLORIDE: 9 INJECTION, SOLUTION INTRAVENOUS at 07:51

## 2019-06-28 RX ADMIN — GUAIFENESIN 600 MG: 600 TABLET, EXTENDED RELEASE ORAL at 20:56

## 2019-06-28 RX ADMIN — FERROUS SULFATE TAB 325 MG (65 MG ELEMENTAL FE) 325 MG: 325 (65 FE) TAB at 20:56

## 2019-06-28 RX ADMIN — METOPROLOL TARTRATE 50 MG: 50 TABLET ORAL at 10:30

## 2019-06-28 RX ADMIN — LEVETIRACETAM 500 MG: 500 TABLET, FILM COATED ORAL at 10:34

## 2019-06-28 RX ADMIN — LEVETIRACETAM 500 MG: 500 TABLET, FILM COATED ORAL at 20:56

## 2019-06-28 RX ADMIN — POTASSIUM CHLORIDE 10 MEQ: 750 CAPSULE, EXTENDED RELEASE ORAL at 20:56

## 2019-06-28 RX ADMIN — CITALOPRAM HYDROBROMIDE 20 MG: 20 TABLET ORAL at 10:34

## 2019-06-28 RX ADMIN — ATORVASTATIN CALCIUM 20 MG: 20 TABLET, FILM COATED ORAL at 10:33

## 2019-06-28 RX ADMIN — INSULIN LISPRO 4 UNITS: 100 INJECTION, SOLUTION INTRAVENOUS; SUBCUTANEOUS at 11:45

## 2019-06-28 RX ADMIN — INSULIN LISPRO 4 UNITS: 100 INJECTION, SOLUTION INTRAVENOUS; SUBCUTANEOUS at 20:53

## 2019-06-28 RX ADMIN — Medication 1750 MG: at 03:31

## 2019-06-28 RX ADMIN — INSULIN GLARGINE 20 UNITS: 100 INJECTION, SOLUTION SUBCUTANEOUS at 10:33

## 2019-06-28 RX ADMIN — INSULIN GLARGINE 5 UNITS: 100 INJECTION, SOLUTION SUBCUTANEOUS at 20:53

## 2019-06-28 RX ADMIN — INSULIN LISPRO 8 UNITS: 100 INJECTION, SOLUTION INTRAVENOUS; SUBCUTANEOUS at 17:07

## 2019-06-28 RX ADMIN — PIPERACILLIN SODIUM,TAZOBACTAM SODIUM 2.25 G: 2; .25 INJECTION, POWDER, FOR SOLUTION INTRAVENOUS at 16:56

## 2019-06-28 RX ADMIN — FLUTICASONE PROPIONATE 2 SPRAY: 50 SPRAY, METERED NASAL at 17:10

## 2019-06-28 RX ADMIN — HYDROCODONE BITARTRATE AND ACETAMINOPHEN 1 TABLET: 5; 325 TABLET ORAL at 20:56

## 2019-06-28 RX ADMIN — ENOXAPARIN SODIUM 30 MG: 30 INJECTION SUBCUTANEOUS at 11:49

## 2019-06-28 RX ADMIN — FERROUS SULFATE TAB 325 MG (65 MG ELEMENTAL FE) 325 MG: 325 (65 FE) TAB at 10:34

## 2019-06-28 RX ADMIN — GABAPENTIN 300 MG: 300 CAPSULE ORAL at 10:34

## 2019-06-28 RX ADMIN — GUAIFENESIN 600 MG: 600 TABLET, EXTENDED RELEASE ORAL at 10:34

## 2019-06-28 RX ADMIN — PIPERACILLIN SODIUM AND TAZOBACTAM SODIUM 3.38 G: 3; .375 INJECTION, POWDER, LYOPHILIZED, FOR SOLUTION INTRAVENOUS at 07:51

## 2019-06-28 RX ADMIN — POTASSIUM CHLORIDE 10 MEQ: 750 CAPSULE, EXTENDED RELEASE ORAL at 10:34

## 2019-06-28 RX ADMIN — PREDNISONE 40 MG: 20 TABLET ORAL at 16:56

## 2019-06-28 RX ADMIN — GABAPENTIN 300 MG: 300 CAPSULE ORAL at 16:56

## 2019-06-28 RX ADMIN — PANTOPRAZOLE SODIUM 40 MG: 40 TABLET, DELAYED RELEASE ORAL at 11:46

## 2019-06-28 RX ADMIN — METHYLPREDNISOLONE SODIUM SUCCINATE 60 MG: 125 INJECTION, POWDER, FOR SOLUTION INTRAMUSCULAR; INTRAVENOUS at 07:51

## 2019-06-28 RX ADMIN — METHYLPREDNISOLONE SODIUM SUCCINATE 60 MG: 125 INJECTION, POWDER, FOR SOLUTION INTRAMUSCULAR; INTRAVENOUS at 11:49

## 2019-06-28 RX ADMIN — PIPERACILLIN SODIUM,TAZOBACTAM SODIUM 2.25 G: 2; .25 INJECTION, POWDER, FOR SOLUTION INTRAVENOUS at 20:55

## 2019-06-28 RX ADMIN — GABAPENTIN 300 MG: 300 CAPSULE ORAL at 20:56

## 2019-06-28 RX ADMIN — METOPROLOL TARTRATE 50 MG: 50 TABLET ORAL at 20:46

## 2019-06-28 RX ADMIN — ASPIRIN 81 MG 324 MG: 81 TABLET ORAL at 02:10

## 2019-06-28 ASSESSMENT — ENCOUNTER SYMPTOMS
SHORTNESS OF BREATH: 1
BACK PAIN: 0
ABDOMINAL PAIN: 0
COUGH: 1
DIARRHEA: 0
CONSTIPATION: 0
NAUSEA: 0
SORE THROAT: 0
VOMITING: 0
WHEEZING: 0

## 2019-06-28 ASSESSMENT — PAIN SCALES - GENERAL
PAINLEVEL_OUTOF10: 5
PAINLEVEL_OUTOF10: 3
PAINLEVEL_OUTOF10: 0

## 2019-06-28 NOTE — ED PROVIDER NOTES
Inject 12 Units into the skin Daily with supper    INSULIN GLARGINE (LANTUS) 100 UNIT/ML INJECTION VIAL    Inject 20 Units into the skin daily    INSULIN GLARGINE (LANTUS) 100 UNIT/ML INJECTION VIAL    Inject 5 Units into the skin daily     IPRATROPIUM-ALBUTEROL (DUONEB) 0.5-2.5 (3) MG/3ML SOLN NEBULIZER SOLUTION    Inhale 1 vial into the lungs every 4 hours as needed for Shortness of Breath    LANSOPRAZOLE (PREVACID) 30 MG CAPSULE    Take 30 mg by mouth daily. LEVETIRACETAM (KEPPRA) 500 MG TABLET    Take 500 mg by mouth 2 times daily. LEVOTHYROXINE (SYNTHROID) 100 MCG TABLET    Take 100 mcg by mouth Daily. LORAZEPAM (ATIVAN) 0.5 MG TABLET    Take 1 tablet by mouth nightly. LOSARTAN (COZAAR) 25 MG TABLET    Take 25 mg by mouth daily    METOPROLOL (LOPRESSOR) 50 MG TABLET    Take 1 tablet by mouth 2 times daily. MULTIPLE VITAMINS-MINERALS (MULTIVITAMIN PO)    Take 1 tablet by mouth daily. MUPIROCIN (BACTROBAN) 2 % OINTMENT    Apply topically 2 times daily Apply to nares topically two times a day for nasal dryness. PILOCARPINE (SALAGEN) 5 MG TABLET    Take 5 mg by mouth 3 times daily (before meals). Indications: Take 1/2 hour before meals    POTASSIUM CHLORIDE (MICRO-K) 10 MEQ EXTENDED RELEASE CAPSULE    Take 10 mEq by mouth 2 times daily     ALLERGIES     is allergic to dye [iodides]; ciprofloxacin; eggs or egg-derived products; mushroom extract complex; and sulfa antibiotics. FAMILY HISTORY     has no family status information on file.       SOCIAL HISTORY       Social History     Tobacco Use    Smoking status: Never Smoker    Smokeless tobacco: Never Used   Substance Use Topics    Alcohol use: No    Drug use: No     PHYSICAL EXAM     INITIAL VITALS: BP (!) 115/50   Pulse 87   Temp 99.5 °F (37.5 °C) (Oral)   Resp 25   Ht 5' 1.42\" (1.56 m)   Wt 272 lb (123.4 kg)   SpO2 94%   BMI 50.70 kg/m²    Physical Exam    MEDICAL DECISION MAKING:            Labs Reviewed   CBC WITH AUTO

## 2019-06-28 NOTE — H&P
[address] [facility]     HISTORY AND PHYSICAL EXAMINATION            Date:   6/28/2019  Patientname:  Tong Gomez  Date of admission:  6/27/2019  9:15 PM  MRN:   [id number]  Account:  [de-identified]  YOB: 1945  PCP:    Cathleen Faulkner MD  Room:   2110/2110-01  Code Status:    Full Code    CHIEF COMPLAINT     Chief Complaint   Patient presents with    Respiratory Distress     HISTORY OF PRESENT ILLNESS  (Character, Onset, Location, Duration,  Exacerbating/RelievingFactors, Radiation,   Associated Symptoms, Severity )      The patient is a 74 y.o.  female with a history of CAD and COPD, who presents from the [facility] with respiratory distress. According to patient, she has been having a frequent, nonproductive cough for the past 7 to 10 days. ED reports that patient developed increased dyspnea and noted O2 sats to be in the 60s on 2 L prior to arrival from [facility]. Patient reported that she was diagnosed with pneumonia 2 days prior and has been taking antibiotics. Symptoms are associated with fever and nonproductive cough. Denies chills, chest pain, abdominal pain, nausea, vomiting, diarrhea, and urinary symptoms. There are no aggravating or alleviating factors. Symptoms are reported as constant and moderate.     PAST MEDICAL HISTORY   Patient  has a past medical history of AMF (acute myelofibrosis) ([address] Anxiety, ARF (acute renal failure) ([address] Arthritis, CAD (coronary artery disease), Cancer of tonsil ([address] Colon polyp, COPD (chronic obstructive pulmonary disease) ([address] Dehydration, Depression, Diabetes mellitus type 2, controlled ([address] DJD (degenerative joint disease), Encephalopathy, Examination of participant in clinical trial, GERD (gastroesophageal reflux disease), Guaiac positive stools, Herpes, Hyperkalemia, Hyperplastic polyp of stomach, Hypertension, Hypothyroid, Multiple gastric polyps, Obesity, Positive FIT (fecal immunochemical test), Seizure ([address] (FLONASE) 50 MCG/ACT nasal spray 2 sprays by Nasal route daily. Yes Historical Provider, MD   gabapentin (NEURONTIN) 300 MG capsule Take 300 mg by mouth 3 times daily. Yes Historical Provider, MD   Insulin Aspart (NOVOLOG SC) Inject 6 Units into the skin 2 times daily Indications: Sliding scale Breakfast and lunch   Yes Historical Provider, MD   levETIRAcetam (KEPPRA) 500 MG tablet Take 500 mg by mouth 2 times daily. Yes Historical Provider, MD   levothyroxine (SYNTHROID) 100 MCG tablet Take 100 mcg by mouth Daily. Yes Historical Provider, MD   Multiple Vitamins-Minerals (MULTIVITAMIN PO) Take 1 tablet by mouth daily. Yes Historical Provider, MD   mupirocin (BACTROBAN) 2 % ointment Apply topically 2 times daily Apply to nares topically two times a day for nasal dryness. Yes Historical Provider, MD   bisacodyl (DULCOLAX) 10 MG suppository Place 10 mg rectally daily as needed for Constipation. Indications: for constipation   Yes Historical Provider, MD   diphenhydrAMINE (BENADRYL) 25 MG capsule Take 25 mg by mouth every 6 hours as needed for Itching. Yes Historical Provider, MD   docusate sodium (COLACE) 100 MG capsule Take 100 mg by mouth 2 times daily as needed for Constipation. Indications: for constipation   Yes Historical Provider, MD   cycloSPORINE (RESTASIS) 0.05 % ophthalmic emulsion Place 1 drop into both eyes 2 times daily. Historical Provider, MD       ALLERGIES      Dye [iodides]; Ciprofloxacin; Eggs or egg-derived products; Mushroom extract complex; and Sulfa antibiotics    REVIEW OF SYSTEMS     Review of Systems   Constitutional: Positive for fever. Negative for chills and diaphoresis. HENT: Negative for congestion and sore throat. Respiratory: Positive for cough and shortness of breath. Negative for wheezing. Cardiovascular: Negative for chest pain, palpitations and leg swelling. Gastrointestinal: Negative for abdominal pain, constipation, diarrhea, nausea and vomiting. home dose Lantus insulin  -POCT ac and hs with sliding scale coverage    Urinary tract infection  -on Zosyn for pneumonia  -Urine culture pending    Elevated troponin  Incidental findings in ED  --trop 21, then 24  -patient denies chest pain  -EKG-NSR with no ST changes  -recheck trop in am    Consultations:     [address] [facility], APRN - CNP   6/28/2019  5:33 AM    Nathalie Johnson [address] [place] [address].    Phone [phone]

## 2019-06-28 NOTE — ED NOTES
Bed: 12  Expected date:   Expected time:   Means of arrival:   Comments:  roz Joseph RN  06/27/19 7575

## 2019-06-28 NOTE — ED NOTES
Report given to Erin Haile RN from PCU. Report method in person   The following was reviewed with receiving RN:   Current vital signs:  BP (!) 115/50   Pulse 87   Temp 99.5 °F (37.5 °C) (Oral)   Resp 25   Ht 5' 1.42\" (1.56 m)   Wt 272 lb (123.4 kg)   SpO2 94%   BMI 50.70 kg/m²                MEWS Score: 2     Any medication or safety alerts were reviewed. Any pending diagnostics and notifications were also reviewed, as well as any safety concerns or issues, abnormal labs, abnormal imaging, and abnormal assessment findings. Questions were answered. * made nurse aware of vancomycin infiltrating in left forearm.  Code s form given to TRUNG Hathaway RN  06/28/19 6326

## 2019-06-28 NOTE — ED NOTES
Pt continues to rest in bed with eyes closed. Pt talking in her sleep. Call light in reach, Continue to monitor.       Kal Lenz RN  06/28/19 3148

## 2019-06-28 NOTE — PROGRESS NOTES
(123.4 kg)  · Admission Body Wt: 272 lb (123.4 kg)  · Ideal Body Wt: 105 lb (47.6 kg), % Ideal Body 259%  · BMI Classification: BMI > or equal to 40.0 Obese Class III    Nutrition Interventions:   Continue current diet, Start ONS  Continued Inpatient Monitoring    Nutrition Evaluation:   · Evaluation: Goals set   · Goals: PO intakes are greater than 75% at meals    · Monitoring: Supplement Intake, Meal Intake, Weight, Pertinent Labs, Monitor Bowel Function, Diet Tolerance, Skin Integrity, Wound Healing, I&O      Aureliano Frankel MFN, R.D, L.D,  Clinical Dietitian  Cell # [phone]  Office # [phone]

## 2019-06-28 NOTE — CARE COORDINATION
TAYLOR learned that this patient is from [facility]. TAYLOR called and spoke with admissions and they did verify that she is a bed hold under her Medicaid insurance. She can return to the facility whenever she is ready/DC'd. SW following.

## 2019-06-28 NOTE — H&P
Tubular adenoma of colon, Unspecified cerebral artery occlusion with cerebral infarction, and Unspecified diseases of blood and blood-forming organs. PAST SURGICAL HISTORY    Patient  has a past surgical history that includes Appendectomy; Hysterectomy; Cholecystectomy; Tonsillectomy; Upper gastrointestinal endoscopy (03/20/2017); Colonoscopy (03/20/2017); pr colonoscopy w/biopsy single/multiple (N/A, 3/20/2017); and pr egd transoral biopsy single/multiple (N/A, 3/20/2017). FAMILY HISTORY    Patient family history is not on file. SOCIAL HISTORY    Patient  reports that she has never smoked. She has never used smokeless tobacco. She reports that she does not drink alcohol or use drugs. HOME MEDICATIONS        Prior to Admission medications    Medication Sig Start Date End Date Taking?  Authorizing Provider   guaiFENesin (ROBITUSSIN) 100 MG/5ML syrup Take 200 mg by mouth every 8 hours as needed for Cough   Yes Historical Provider, MD   Estradiol 10 % CREA by Each Nostril route nightly For hormone therapy   Yes Historical Provider, MD   ipratropium-albuterol (Jasmyn Alfaro) 0.5-2.5 (3) MG/3ML SOLN nebulizer solution Inhale 1 vial into the lungs every 4 hours as needed for Shortness of Breath   Yes Historical Provider, MD   ferrous sulfate 325 (65 Fe) MG tablet Take 325 mg by mouth 2 times daily   Yes Historical Provider, MD   potassium chloride (MICRO-K) 10 MEQ extended release capsule Take 10 mEq by mouth 2 times daily   Yes Historical Provider, MD   loperamide (IMODIUM) 2 MG capsule Take 2 mg by mouth every 6 hours as needed for Diarrhea   Yes Historical Provider, MD   meclizine (ANTIVERT) 25 MG tablet Take 25 mg by mouth every 8 hours as needed for Dizziness   Yes Historical Provider, MD   guaiFENesin (MUCINEX) 600 MG extended release tablet Take 600 mg by mouth every 12 hours as needed for Congestion (cough)   Yes Historical Provider, MD   HYDROcodone-acetaminophen (NORCO) 5-325 MG per tablet Take 1 tablet by mouth 2 times daily. Yes Historical Provider, MD   omeprazole (PRILOSEC) 20 MG delayed release capsule Take 20 mg by mouth daily   Yes Historical Provider, MD   Phenylephrine HCl 10 MG TABS Take 10 mg by mouth every 8 hours as needed (congestion)   Yes Historical Provider, MD   benzonatate (TESSALON) 100 MG capsule Take 100 mg by mouth every 8 hours as needed for Cough   Yes Historical Provider, MD   ranitidine (ZANTAC) 150 MG tablet Take 150 mg by mouth daily   Yes Historical Provider, MD   Calcium Carb-Cholecalciferol (CALTRATE 600+D) 600-800 MG-UNIT TABS Take 1 tablet by mouth daily   Yes Historical Provider, MD   Cranberry 475 MG CAPS Take 2 tablets by mouth daily   Yes Historical Provider, MD   insulin glargine (LANTUS) 100 UNIT/ML injection vial Inject 20 Units into the skin daily   Yes Historical Provider, MD   insulin glargine (LANTUS) 100 UNIT/ML injection vial Inject 5 Units into the skin daily    Yes Historical Provider, MD   insulin aspart (NOVOLOG) 100 UNIT/ML injection vial Inject 12 Units into the skin Daily with supper    Yes Historical Provider, MD   losartan (COZAAR) 25 MG tablet Take 25 mg by mouth daily   Yes Historical Provider, MD   Dulaglutide (TRULICITY) 1.5 ZP/5.5PI SOPN Inject 1 Dose into the skin once a week fridays   Yes Historical Provider, MD   furosemide (LASIX) 20 MG tablet Take 1 tablet by mouth daily. Patient taking differently: Take 40 mg by mouth daily  1/8/15  Yes Remington Cortez MD   aspirin 81 MG EC tablet Take 1 tablet by mouth daily. 12/27/14  Yes Victoriano Leblanc MD   metoprolol (LOPRESSOR) 50 MG tablet Take 1 tablet by mouth 2 times daily. 12/27/14  Yes Victoriano Leblanc MD   atorvastatin (LIPITOR) 20 MG tablet Take 20 mg by mouth daily. Yes Historical Provider, MD   cetirizine (ZYRTEC) 10 MG tablet Take 10 mg by mouth daily. Yes Historical Provider, MD   citalopram (CELEXA) 20 MG tablet Take 20 mg by mouth daily.    Yes Historical Provider, MD   fluticasone Genitourinary: Negative for dysuria, frequency and urgency. Musculoskeletal: Negative for back pain and myalgias. Skin: Negative for rash. Neurological: Negative for dizziness, weakness and headaches. Psychiatric/Behavioral: The patient is not nervous/anxious. PHYSICAL EXAM      /87   Pulse 83   Temp 98.7 °F (37.1 °C) (Oral)   Resp 25   Ht 5' 1.42\" (1.56 m)   Wt 272 lb (123.4 kg)   SpO2 99%   BMI 50.70 kg/m²  Body mass index is 50.7 kg/m². Physical Exam   Constitutional: She is oriented to person, place, and time. She appears well-developed and well-nourished. No distress. Morbid obesity   HENT:   Head: Normocephalic and atraumatic. Mouth/Throat: Oropharynx is clear and moist.   Eyes: Pupils are equal, round, and reactive to light. Conjunctivae and EOM are normal.   Neck: Normal range of motion. Neck supple. No tracheal deviation present. Cardiovascular: Normal rate, regular rhythm, normal heart sounds and intact distal pulses. Exam reveals no gallop and no friction rub. No murmur heard. Pulmonary/Chest: Effort normal. No respiratory distress. She has wheezes (mild wheezing throughout). She has no rales. She exhibits no tenderness. Abdominal: Soft. Bowel sounds are normal. She exhibits no distension. There is no tenderness. There is no guarding. Musculoskeletal: Normal range of motion. She exhibits no edema or tenderness. Lymphadenopathy:     She has no cervical adenopathy. Neurological: She is alert and oriented to person, place, and time. She displays no seizure activity. Coordination normal.   Skin: Skin is warm and dry. No rash noted. She is not diaphoretic. No erythema. No pallor. Psychiatric: She has a normal mood and affect.  Her behavior is normal. Thought content normal.     DIAGNOSTICS      EKG:   EKG 12 Lead [[id number]] Collected: 06/27/19 2127   Updated: 06/27/19 2129     Ventricular Rate 80 BPM    Atrial Rate 80 BPM    P-R Interval 146 ms    QRS

## 2019-06-28 NOTE — ED NOTES
Pt arrives to ED c/o respiratory distress. Per EMS the nurse at nursing home, agata bran, states pts spo2 dropped to 64% on 2L nasal canula. Nurse also states she heard bilateral wheezing. Per EMS pt spo2 was 94% on 4L room air during transport and was A&Ox4. . Pt states she was recently diagnosed with pneumonia and is usually on 2L nasal canula. Per report pt has been receiving antibiotics at nursing home. Upon arrival pt states she doesn't not feel short of breath. Pt attached to cardiac monitor and continuous pulse oximetry. Pt is A&Ox4, eupneic, PWD. GCS=15. Call light in reach.        Myriam Zabala RN  06/28/19 8126

## 2019-06-29 ENCOUNTER — APPOINTMENT (OUTPATIENT)
Dept: GENERAL RADIOLOGY | Age: 74
DRG: 193 | End: 2019-06-29
Payer: MEDICARE

## 2019-06-29 LAB
ANION GAP SERPL CALCULATED.3IONS-SCNC: 11 MMOL/L (ref 9–17)
BUN BLDV-MCNC: 37 MG/DL (ref 8–23)
BUN/CREAT BLD: ABNORMAL (ref 9–20)
CALCIUM SERPL-MCNC: 8.5 MG/DL (ref 8.6–10.4)
CHLORIDE BLD-SCNC: 99 MMOL/L (ref 98–107)
CO2: 25 MMOL/L (ref 20–31)
CREAT SERPL-MCNC: 2.01 MG/DL (ref 0.5–0.9)
CULTURE: NORMAL
GFR AFRICAN AMERICAN: 29 ML/MIN
GFR NON-AFRICAN AMERICAN: 24 ML/MIN
GFR SERPL CREATININE-BSD FRML MDRD: ABNORMAL ML/MIN/{1.73_M2}
GFR SERPL CREATININE-BSD FRML MDRD: ABNORMAL ML/MIN/{1.73_M2}
GLUCOSE BLD-MCNC: 187 MG/DL (ref 65–105)
GLUCOSE BLD-MCNC: 200 MG/DL (ref 65–105)
GLUCOSE BLD-MCNC: 219 MG/DL (ref 65–105)
GLUCOSE BLD-MCNC: 226 MG/DL (ref 70–99)
GLUCOSE BLD-MCNC: 231 MG/DL (ref 65–105)
HCT VFR BLD CALC: 30.2 % (ref 36–46)
HEMOGLOBIN: 9.5 G/DL (ref 12–16)
Lab: NORMAL
MCH RBC QN AUTO: 28.9 PG (ref 26–34)
MCHC RBC AUTO-ENTMCNC: 31.4 G/DL (ref 31–37)
MCV RBC AUTO: 92.3 FL (ref 80–100)
NRBC AUTOMATED: ABNORMAL PER 100 WBC
PDW BLD-RTO: 14.1 % (ref 11.5–14.9)
PLATELET # BLD: 228 K/UL (ref 150–450)
PMV BLD AUTO: 8.8 FL (ref 6–12)
POTASSIUM SERPL-SCNC: 5.5 MMOL/L (ref 3.7–5.3)
RBC # BLD: 3.27 M/UL (ref 4–5.2)
SODIUM BLD-SCNC: 135 MMOL/L (ref 135–144)
SPECIMEN DESCRIPTION: NORMAL
WBC # BLD: 12.3 K/UL (ref 3.5–11)

## 2019-06-29 PROCEDURE — 99232 SBSQ HOSP IP/OBS MODERATE 35: CPT | Performed by: INTERNAL MEDICINE

## 2019-06-29 PROCEDURE — 36415 COLL VENOUS BLD VENIPUNCTURE: CPT

## 2019-06-29 PROCEDURE — 86738 MYCOPLASMA ANTIBODY: CPT

## 2019-06-29 PROCEDURE — 2060000000 HC ICU INTERMEDIATE R&B

## 2019-06-29 PROCEDURE — 71046 X-RAY EXAM CHEST 2 VIEWS: CPT

## 2019-06-29 PROCEDURE — 6360000002 HC RX W HCPCS: Performed by: INTERNAL MEDICINE

## 2019-06-29 PROCEDURE — 85027 COMPLETE CBC AUTOMATED: CPT

## 2019-06-29 PROCEDURE — 6370000000 HC RX 637 (ALT 250 FOR IP): Performed by: INTERNAL MEDICINE

## 2019-06-29 PROCEDURE — 94640 AIRWAY INHALATION TREATMENT: CPT

## 2019-06-29 PROCEDURE — 2700000000 HC OXYGEN THERAPY PER DAY

## 2019-06-29 PROCEDURE — 80048 BASIC METABOLIC PNL TOTAL CA: CPT

## 2019-06-29 PROCEDURE — 6370000000 HC RX 637 (ALT 250 FOR IP): Performed by: NURSE PRACTITIONER

## 2019-06-29 PROCEDURE — 2580000003 HC RX 258: Performed by: INTERNAL MEDICINE

## 2019-06-29 PROCEDURE — 94760 N-INVAS EAR/PLS OXIMETRY 1: CPT

## 2019-06-29 PROCEDURE — 2580000003 HC RX 258: Performed by: NURSE PRACTITIONER

## 2019-06-29 PROCEDURE — 82947 ASSAY GLUCOSE BLOOD QUANT: CPT

## 2019-06-29 PROCEDURE — 6360000002 HC RX W HCPCS: Performed by: NURSE PRACTITIONER

## 2019-06-29 RX ORDER — HEPARIN SODIUM 5000 [USP'U]/ML
5000 INJECTION, SOLUTION INTRAVENOUS; SUBCUTANEOUS EVERY 8 HOURS SCHEDULED
Status: DISCONTINUED | OUTPATIENT
Start: 2019-06-29 | End: 2019-07-06 | Stop reason: HOSPADM

## 2019-06-29 RX ORDER — SODIUM CHLORIDE 9 MG/ML
INJECTION, SOLUTION INTRAVENOUS ONCE
Status: COMPLETED | OUTPATIENT
Start: 2019-06-29 | End: 2019-06-29

## 2019-06-29 RX ORDER — DOXYCYCLINE 100 MG/1
100 CAPSULE ORAL EVERY 12 HOURS SCHEDULED
Status: DISCONTINUED | OUTPATIENT
Start: 2019-06-29 | End: 2019-07-01

## 2019-06-29 RX ADMIN — INSULIN GLARGINE 5 UNITS: 100 INJECTION, SOLUTION SUBCUTANEOUS at 22:14

## 2019-06-29 RX ADMIN — FERROUS SULFATE TAB 325 MG (65 MG ELEMENTAL FE) 325 MG: 325 (65 FE) TAB at 22:13

## 2019-06-29 RX ADMIN — HEPARIN SODIUM 5000 UNITS: 5000 INJECTION INTRAVENOUS; SUBCUTANEOUS at 17:46

## 2019-06-29 RX ADMIN — SODIUM CHLORIDE: 9 INJECTION, SOLUTION INTRAVENOUS at 17:49

## 2019-06-29 RX ADMIN — INSULIN LISPRO 2 UNITS: 100 INJECTION, SOLUTION INTRAVENOUS; SUBCUTANEOUS at 12:19

## 2019-06-29 RX ADMIN — DOXYCYCLINE 100 MG: 100 CAPSULE ORAL at 22:13

## 2019-06-29 RX ADMIN — FERROUS SULFATE TAB 325 MG (65 MG ELEMENTAL FE) 325 MG: 325 (65 FE) TAB at 09:20

## 2019-06-29 RX ADMIN — HYDROCODONE BITARTRATE AND ACETAMINOPHEN 1 TABLET: 5; 325 TABLET ORAL at 22:13

## 2019-06-29 RX ADMIN — PREDNISONE 40 MG: 20 TABLET ORAL at 09:21

## 2019-06-29 RX ADMIN — HYDROCODONE BITARTRATE AND ACETAMINOPHEN 1 TABLET: 5; 325 TABLET ORAL at 09:20

## 2019-06-29 RX ADMIN — PANTOPRAZOLE SODIUM 40 MG: 40 TABLET, DELAYED RELEASE ORAL at 06:25

## 2019-06-29 RX ADMIN — METOPROLOL TARTRATE 50 MG: 50 TABLET ORAL at 09:20

## 2019-06-29 RX ADMIN — METOPROLOL TARTRATE 50 MG: 50 TABLET ORAL at 22:13

## 2019-06-29 RX ADMIN — INSULIN LISPRO 4 UNITS: 100 INJECTION, SOLUTION INTRAVENOUS; SUBCUTANEOUS at 09:18

## 2019-06-29 RX ADMIN — PIPERACILLIN SODIUM,TAZOBACTAM SODIUM 2.25 G: 2; .25 INJECTION, POWDER, FOR SOLUTION INTRAVENOUS at 09:36

## 2019-06-29 RX ADMIN — GABAPENTIN 300 MG: 300 CAPSULE ORAL at 22:12

## 2019-06-29 RX ADMIN — LEVOTHYROXINE SODIUM 100 MCG: 100 TABLET ORAL at 06:25

## 2019-06-29 RX ADMIN — GABAPENTIN 300 MG: 300 CAPSULE ORAL at 12:20

## 2019-06-29 RX ADMIN — IPRATROPIUM BROMIDE AND ALBUTEROL SULFATE 3 ML: .5; 3 SOLUTION RESPIRATORY (INHALATION) at 13:46

## 2019-06-29 RX ADMIN — FLUTICASONE PROPIONATE 2 SPRAY: 50 SPRAY, METERED NASAL at 09:22

## 2019-06-29 RX ADMIN — GABAPENTIN 300 MG: 300 CAPSULE ORAL at 09:21

## 2019-06-29 RX ADMIN — CETIRIZINE HYDROCHLORIDE 5 MG: 10 TABLET, FILM COATED ORAL at 09:20

## 2019-06-29 RX ADMIN — LEVETIRACETAM 500 MG: 500 TABLET, FILM COATED ORAL at 09:20

## 2019-06-29 RX ADMIN — ATORVASTATIN CALCIUM 20 MG: 20 TABLET, FILM COATED ORAL at 09:20

## 2019-06-29 RX ADMIN — GUAIFENESIN 600 MG: 600 TABLET, EXTENDED RELEASE ORAL at 22:13

## 2019-06-29 RX ADMIN — CITALOPRAM HYDROBROMIDE 20 MG: 20 TABLET ORAL at 09:20

## 2019-06-29 RX ADMIN — PIPERACILLIN SODIUM,TAZOBACTAM SODIUM 2.25 G: 2; .25 INJECTION, POWDER, FOR SOLUTION INTRAVENOUS at 02:30

## 2019-06-29 RX ADMIN — INSULIN LISPRO 2 UNITS: 100 INJECTION, SOLUTION INTRAVENOUS; SUBCUTANEOUS at 17:46

## 2019-06-29 RX ADMIN — LEVETIRACETAM 500 MG: 500 TABLET, FILM COATED ORAL at 22:00

## 2019-06-29 RX ADMIN — INSULIN GLARGINE 20 UNITS: 100 INJECTION, SOLUTION SUBCUTANEOUS at 09:17

## 2019-06-29 RX ADMIN — GUAIFENESIN 600 MG: 600 TABLET, EXTENDED RELEASE ORAL at 09:20

## 2019-06-29 ASSESSMENT — ENCOUNTER SYMPTOMS
SORE THROAT: 0
BACK PAIN: 0
CONSTIPATION: 0
SHORTNESS OF BREATH: 1
NAUSEA: 0
COUGH: 1
DIARRHEA: 0
VOMITING: 0
WHEEZING: 0
ABDOMINAL PAIN: 0

## 2019-06-29 ASSESSMENT — PAIN SCALES - GENERAL
PAINLEVEL_OUTOF10: 0
PAINLEVEL_OUTOF10: 3
PAINLEVEL_OUTOF10: 3
PAINLEVEL_OUTOF10: 5

## 2019-06-29 NOTE — PROGRESS NOTES
Tubular adenoma of colon, Unspecified cerebral artery occlusion with cerebral infarction, and Unspecified diseases of blood and blood-forming organs. PAST SURGICAL HISTORY    Patient  has a past surgical history that includes Appendectomy; Hysterectomy; Cholecystectomy; Tonsillectomy; Upper gastrointestinal endoscopy (03/20/2017); Colonoscopy (03/20/2017); pr colonoscopy w/biopsy single/multiple (N/A, 3/20/2017); and pr egd transoral biopsy single/multiple (N/A, 3/20/2017). FAMILY HISTORY    Patient family history is not on file. SOCIAL HISTORY    Patient  reports that she has never smoked. She has never used smokeless tobacco. She reports that she does not drink alcohol or use drugs. HOME MEDICATIONS        Prior to Admission medications    Medication Sig Start Date End Date Taking?  Authorizing Provider   guaiFENesin (ROBITUSSIN) 100 MG/5ML syrup Take 200 mg by mouth every 8 hours as needed for Cough   Yes Historical Provider, MD   Estradiol 10 % CREA by Each Nostril route nightly For hormone therapy   Yes Historical Provider, MD   ipratropium-albuterol (Rubye Dice) 0.5-2.5 (3) MG/3ML SOLN nebulizer solution Inhale 1 vial into the lungs every 4 hours as needed for Shortness of Breath   Yes Historical Provider, MD   ferrous sulfate 325 (65 Fe) MG tablet Take 325 mg by mouth 2 times daily   Yes Historical Provider, MD   potassium chloride (MICRO-K) 10 MEQ extended release capsule Take 10 mEq by mouth 2 times daily   Yes Historical Provider, MD   loperamide (IMODIUM) 2 MG capsule Take 2 mg by mouth every 6 hours as needed for Diarrhea   Yes Historical Provider, MD   meclizine (ANTIVERT) 25 MG tablet Take 25 mg by mouth every 8 hours as needed for Dizziness   Yes Historical Provider, MD   guaiFENesin (MUCINEX) 600 MG extended release tablet Take 600 mg by mouth every 12 hours as needed for Congestion (cough)   Yes Historical Provider, MD   HYDROcodone-acetaminophen (NORCO) 5-325 MG per tablet Take 1 tablet by (FLONASE) 50 MCG/ACT nasal spray 2 sprays by Nasal route daily. Yes Historical Provider, MD   gabapentin (NEURONTIN) 300 MG capsule Take 300 mg by mouth 3 times daily. Yes Historical Provider, MD   Insulin Aspart (NOVOLOG SC) Inject 6 Units into the skin 2 times daily Indications: Sliding scale Breakfast and lunch   Yes Historical Provider, MD   levETIRAcetam (KEPPRA) 500 MG tablet Take 500 mg by mouth 2 times daily. Yes Historical Provider, MD   levothyroxine (SYNTHROID) 100 MCG tablet Take 100 mcg by mouth Daily. Yes Historical Provider, MD   Multiple Vitamins-Minerals (MULTIVITAMIN PO) Take 1 tablet by mouth daily. Yes Historical Provider, MD   mupirocin (BACTROBAN) 2 % ointment Apply topically 2 times daily Apply to nares topically two times a day for nasal dryness. Yes Historical Provider, MD   bisacodyl (DULCOLAX) 10 MG suppository Place 10 mg rectally daily as needed for Constipation. Indications: for constipation   Yes Historical Provider, MD   diphenhydrAMINE (BENADRYL) 25 MG capsule Take 25 mg by mouth every 6 hours as needed for Itching. Yes Historical Provider, MD   docusate sodium (COLACE) 100 MG capsule Take 100 mg by mouth 2 times daily as needed for Constipation. Indications: for constipation   Yes Historical Provider, MD   cycloSPORINE (RESTASIS) 0.05 % ophthalmic emulsion Place 1 drop into both eyes 2 times daily. Historical Provider, MD       ALLERGIES      Dye [iodides]; Ciprofloxacin; Eggs or egg-derived products; Mushroom extract complex; and Sulfa antibiotics    REVIEW OF SYSTEMS     Review of Systems   Constitutional: Positive for fever. Negative for chills and diaphoresis. HENT: Negative for congestion and sore throat. Respiratory: Positive for cough and shortness of breath. Negative for wheezing. Cardiovascular: Negative for chest pain, palpitations and leg swelling. Gastrointestinal: Negative for abdominal pain, constipation, diarrhea, nausea and vomiting. Genitourinary: Negative for dysuria, frequency and urgency. Musculoskeletal: Negative for back pain and myalgias. Skin: Negative for rash. Neurological: Negative for dizziness, weakness and headaches. Psychiatric/Behavioral: The patient is not nervous/anxious. PHYSICAL EXAM      BP (!) 123/59   Pulse 69   Temp 98 °F (36.7 °C) (Axillary)   Resp 20   Ht 5' 1.42\" (1.56 m)   Wt 272 lb (123.4 kg)   SpO2 98%   BMI 50.70 kg/m²  Body mass index is 50.7 kg/m². Physical Exam   Constitutional: She is oriented to person, place, and time. She appears well-developed and well-nourished. No distress. Morbid obesity   HENT:   Head: Normocephalic and atraumatic. Mouth/Throat: Oropharynx is clear and moist.   Eyes: Pupils are equal, round, and reactive to light. Conjunctivae and EOM are normal.   Neck: Normal range of motion. Neck supple. No tracheal deviation present. Cardiovascular: Normal rate, regular rhythm, normal heart sounds and intact distal pulses. Exam reveals no gallop and no friction rub. No murmur heard. Pulmonary/Chest: Effort normal. No respiratory distress. She has wheezes (mild wheezing throughout). She has no rales. She exhibits no tenderness. Abdominal: Soft. Bowel sounds are normal. She exhibits no distension. There is no tenderness. There is no guarding. Musculoskeletal: Normal range of motion. She exhibits no edema or tenderness. Lymphadenopathy:     She has no cervical adenopathy. Neurological: She is alert and oriented to person, place, and time. She displays no seizure activity. Coordination normal.   Skin: Skin is warm and dry. No rash noted. She is not diaphoretic. No erythema. No pallor. Psychiatric: She has a normal mood and affect.  Her behavior is normal. Thought content normal.     DIAGNOSTICS      EKG:   EKG 12 Lead [[id number]] Collected: 06/27/19 2127   Updated: 06/27/19 2129     Ventricular Rate 80 BPM    Atrial Rate 80 BPM    P-R Interval 146 ms    QRS in March 2017  -NS fluid bolus in ED  -continue IVF on admission  -hold diuretics/nephrotoxic medications  -monitor BMP    COPD Exacerbation  -IV Solumedrol 60 mg 4x/day  -Continue on admission  -Mucinex BID  -Duoneb aerosols Q 4 hours as needed    Diabetes Mellitus  -Continue home dose Lantus insulin  -POCT ac and hs with sliding scale coverage    Urinary tract infection  -on Zosyn for pneumonia  -Urine culture pending    Elevated troponin  Incidental findings in ED  --trop 21, then 24  -patient denies chest pain  -EKG-NSR with no ST changes  -recheck trop in am    Consultations:     [address] with H&P recorded by Abdon Chan CNP. Patient admitted from nursing home with episode of desaturation, she was found to be febrile her temp was around 100  She was treated for antibiotics as outpatient  Patient has multiple medical problems which include morbid obesity, hypertension, COPD, she mentioned that she was never a smoker, diabetes, hypothyroidism, depression, diabetes  Patient was found to have acute kidney injury, her Lasix, losartan is on hold  Patient is a nursing home resident  She has history of tonsillar cancer status post surgery and after that she had a fall. Patient mentioned that she has mainly immobile  Her strap from nose is positive for MRSA  Started on vancomycin and Zosyn  Started on IV Solu-Medrol  Patient has bibasilar wheezing  We will change her steroids to 40 mg p.o.   She had echocardiogram done in 2014 suggested ejection next 55%  I reviewed her previous CAT scan done in 2013, she has almost similar shadows in her right lower lobe  Ordering pro calcitonin level  Patient is on DVT prophylaxis  EKG suggests right bundle-branch block  Started on gentle hydration with acute kidney injury, monitoring BMP, likely consult nephrology if has worsening of kidney numbers  Pharmacy dosing the vancomycin  6/29   Patient is feeling much better  Urine culture is negative  Repeat chest x-ray suggestive of infiltrates in right upper lobe,  Blood cultures stayed negative  Patient remained afebrile  Pro-calcitomin is 0.69   WBC is 12.3 down from 20   Will de-escalate antibiotics to doxycycline  Patient is allergic to fluoroquinolones  PT consult      Zayra Tinajero MD   6/29/2019  3:54 PM    [facility] [address] [place] [address].    Phone [phone]

## 2019-06-30 LAB
ANION GAP SERPL CALCULATED.3IONS-SCNC: 10 MMOL/L (ref 9–17)
BUN BLDV-MCNC: 46 MG/DL (ref 8–23)
BUN/CREAT BLD: ABNORMAL (ref 9–20)
CALCIUM SERPL-MCNC: 8.9 MG/DL (ref 8.6–10.4)
CHLORIDE BLD-SCNC: 101 MMOL/L (ref 98–107)
CO2: 27 MMOL/L (ref 20–31)
CREAT SERPL-MCNC: 2.06 MG/DL (ref 0.5–0.9)
GFR AFRICAN AMERICAN: 28 ML/MIN
GFR NON-AFRICAN AMERICAN: 23 ML/MIN
GFR SERPL CREATININE-BSD FRML MDRD: ABNORMAL ML/MIN/{1.73_M2}
GFR SERPL CREATININE-BSD FRML MDRD: ABNORMAL ML/MIN/{1.73_M2}
GLUCOSE BLD-MCNC: 144 MG/DL (ref 65–105)
GLUCOSE BLD-MCNC: 152 MG/DL (ref 65–105)
GLUCOSE BLD-MCNC: 190 MG/DL (ref 65–105)
GLUCOSE BLD-MCNC: 196 MG/DL (ref 70–99)
GLUCOSE BLD-MCNC: 206 MG/DL (ref 65–105)
HCT VFR BLD CALC: 31.1 % (ref 36–46)
HEMOGLOBIN: 9.9 G/DL (ref 12–16)
MCH RBC QN AUTO: 29.1 PG (ref 26–34)
MCHC RBC AUTO-ENTMCNC: 31.7 G/DL (ref 31–37)
MCV RBC AUTO: 91.7 FL (ref 80–100)
NRBC AUTOMATED: ABNORMAL PER 100 WBC
PDW BLD-RTO: 14.1 % (ref 11.5–14.9)
PLATELET # BLD: 257 K/UL (ref 150–450)
PMV BLD AUTO: 8.4 FL (ref 6–12)
POTASSIUM SERPL-SCNC: 4.8 MMOL/L (ref 3.7–5.3)
RBC # BLD: 3.39 M/UL (ref 4–5.2)
SODIUM BLD-SCNC: 138 MMOL/L (ref 135–144)
WBC # BLD: 13.1 K/UL (ref 3.5–11)

## 2019-06-30 PROCEDURE — 87486 CHLMYD PNEUM DNA AMP PROBE: CPT

## 2019-06-30 PROCEDURE — 94640 AIRWAY INHALATION TREATMENT: CPT

## 2019-06-30 PROCEDURE — 6370000000 HC RX 637 (ALT 250 FOR IP): Performed by: INTERNAL MEDICINE

## 2019-06-30 PROCEDURE — 6360000002 HC RX W HCPCS: Performed by: INTERNAL MEDICINE

## 2019-06-30 PROCEDURE — 80048 BASIC METABOLIC PNL TOTAL CA: CPT

## 2019-06-30 PROCEDURE — 2060000000 HC ICU INTERMEDIATE R&B

## 2019-06-30 PROCEDURE — 87633 RESP VIRUS 12-25 TARGETS: CPT

## 2019-06-30 PROCEDURE — 51798 US URINE CAPACITY MEASURE: CPT

## 2019-06-30 PROCEDURE — 99232 SBSQ HOSP IP/OBS MODERATE 35: CPT | Performed by: INTERNAL MEDICINE

## 2019-06-30 PROCEDURE — 87581 M.PNEUMON DNA AMP PROBE: CPT

## 2019-06-30 PROCEDURE — 82947 ASSAY GLUCOSE BLOOD QUANT: CPT

## 2019-06-30 PROCEDURE — 85027 COMPLETE CBC AUTOMATED: CPT

## 2019-06-30 PROCEDURE — 94667 MNPJ CHEST WALL 1ST: CPT

## 2019-06-30 PROCEDURE — 2700000000 HC OXYGEN THERAPY PER DAY

## 2019-06-30 PROCEDURE — 36415 COLL VENOUS BLD VENIPUNCTURE: CPT

## 2019-06-30 PROCEDURE — 94761 N-INVAS EAR/PLS OXIMETRY MLT: CPT

## 2019-06-30 PROCEDURE — 97110 THERAPEUTIC EXERCISES: CPT

## 2019-06-30 PROCEDURE — 87798 DETECT AGENT NOS DNA AMP: CPT

## 2019-06-30 PROCEDURE — 2580000003 HC RX 258: Performed by: NURSE PRACTITIONER

## 2019-06-30 PROCEDURE — 6370000000 HC RX 637 (ALT 250 FOR IP): Performed by: NURSE PRACTITIONER

## 2019-06-30 PROCEDURE — 97530 THERAPEUTIC ACTIVITIES: CPT

## 2019-06-30 RX ORDER — DIPHENHYDRAMINE HCL 25 MG
25 TABLET ORAL 2 TIMES DAILY
Status: DISCONTINUED | OUTPATIENT
Start: 2019-06-30 | End: 2019-07-01

## 2019-06-30 RX ADMIN — FERROUS SULFATE TAB 325 MG (65 MG ELEMENTAL FE) 325 MG: 325 (65 FE) TAB at 21:18

## 2019-06-30 RX ADMIN — LEVOTHYROXINE SODIUM 100 MCG: 100 TABLET ORAL at 08:45

## 2019-06-30 RX ADMIN — GABAPENTIN 300 MG: 300 CAPSULE ORAL at 15:08

## 2019-06-30 RX ADMIN — INSULIN LISPRO 2 UNITS: 100 INJECTION, SOLUTION INTRAVENOUS; SUBCUTANEOUS at 18:03

## 2019-06-30 RX ADMIN — METOPROLOL TARTRATE 50 MG: 50 TABLET ORAL at 11:10

## 2019-06-30 RX ADMIN — INSULIN LISPRO 2 UNITS: 100 INJECTION, SOLUTION INTRAVENOUS; SUBCUTANEOUS at 08:47

## 2019-06-30 RX ADMIN — FLUTICASONE PROPIONATE 2 SPRAY: 50 SPRAY, METERED NASAL at 11:12

## 2019-06-30 RX ADMIN — HYDROCODONE BITARTRATE AND ACETAMINOPHEN 1 TABLET: 5; 325 TABLET ORAL at 21:17

## 2019-06-30 RX ADMIN — LEVETIRACETAM 500 MG: 500 TABLET, FILM COATED ORAL at 21:17

## 2019-06-30 RX ADMIN — GUAIFENESIN 600 MG: 600 TABLET, EXTENDED RELEASE ORAL at 11:10

## 2019-06-30 RX ADMIN — LEVETIRACETAM 500 MG: 500 TABLET, FILM COATED ORAL at 11:10

## 2019-06-30 RX ADMIN — GUAIFENESIN 600 MG: 600 TABLET, EXTENDED RELEASE ORAL at 21:18

## 2019-06-30 RX ADMIN — Medication 1 MG: at 21:18

## 2019-06-30 RX ADMIN — METOPROLOL TARTRATE 50 MG: 50 TABLET ORAL at 21:17

## 2019-06-30 RX ADMIN — HYDROCODONE BITARTRATE AND ACETAMINOPHEN 1 TABLET: 5; 325 TABLET ORAL at 08:46

## 2019-06-30 RX ADMIN — INSULIN GLARGINE 5 UNITS: 100 INJECTION, SOLUTION SUBCUTANEOUS at 21:17

## 2019-06-30 RX ADMIN — GABAPENTIN 300 MG: 300 CAPSULE ORAL at 21:18

## 2019-06-30 RX ADMIN — PANTOPRAZOLE SODIUM 40 MG: 40 TABLET, DELAYED RELEASE ORAL at 08:45

## 2019-06-30 RX ADMIN — DIPHENHYDRAMINE HCL 25 MG: 25 TABLET ORAL at 11:11

## 2019-06-30 RX ADMIN — SODIUM CHLORIDE: 9 INJECTION, SOLUTION INTRAVENOUS at 11:11

## 2019-06-30 RX ADMIN — PREDNISONE 40 MG: 20 TABLET ORAL at 11:10

## 2019-06-30 RX ADMIN — IPRATROPIUM BROMIDE AND ALBUTEROL SULFATE 3 ML: .5; 3 SOLUTION RESPIRATORY (INHALATION) at 15:13

## 2019-06-30 RX ADMIN — INSULIN GLARGINE 20 UNITS: 100 INJECTION, SOLUTION SUBCUTANEOUS at 08:48

## 2019-06-30 RX ADMIN — CITALOPRAM HYDROBROMIDE 20 MG: 20 TABLET ORAL at 11:10

## 2019-06-30 RX ADMIN — DOXYCYCLINE 100 MG: 100 CAPSULE ORAL at 11:11

## 2019-06-30 RX ADMIN — INSULIN LISPRO 2 UNITS: 100 INJECTION, SOLUTION INTRAVENOUS; SUBCUTANEOUS at 12:00

## 2019-06-30 RX ADMIN — DOCUSATE SODIUM 100 MG: 100 CAPSULE, LIQUID FILLED ORAL at 11:11

## 2019-06-30 RX ADMIN — DIPHENHYDRAMINE HCL 25 MG: 25 TABLET ORAL at 21:18

## 2019-06-30 RX ADMIN — IPRATROPIUM BROMIDE AND ALBUTEROL SULFATE 3 ML: .5; 3 SOLUTION RESPIRATORY (INHALATION) at 12:30

## 2019-06-30 RX ADMIN — GABAPENTIN 300 MG: 300 CAPSULE ORAL at 11:11

## 2019-06-30 RX ADMIN — ATORVASTATIN CALCIUM 20 MG: 20 TABLET, FILM COATED ORAL at 15:08

## 2019-06-30 RX ADMIN — HEPARIN SODIUM 5000 UNITS: 5000 INJECTION INTRAVENOUS; SUBCUTANEOUS at 15:07

## 2019-06-30 RX ADMIN — FERROUS SULFATE TAB 325 MG (65 MG ELEMENTAL FE) 325 MG: 325 (65 FE) TAB at 11:10

## 2019-06-30 RX ADMIN — DOXYCYCLINE 100 MG: 100 CAPSULE ORAL at 21:17

## 2019-06-30 RX ADMIN — CETIRIZINE HYDROCHLORIDE 5 MG: 10 TABLET, FILM COATED ORAL at 11:11

## 2019-06-30 ASSESSMENT — PAIN DESCRIPTION - FREQUENCY
FREQUENCY: CONTINUOUS

## 2019-06-30 ASSESSMENT — PAIN SCALES - GENERAL
PAINLEVEL_OUTOF10: 1
PAINLEVEL_OUTOF10: 2
PAINLEVEL_OUTOF10: 3
PAINLEVEL_OUTOF10: 2
PAINLEVEL_OUTOF10: 1
PAINLEVEL_OUTOF10: 2
PAINLEVEL_OUTOF10: 2

## 2019-06-30 ASSESSMENT — PAIN DESCRIPTION - LOCATION
LOCATION: HIP

## 2019-06-30 ASSESSMENT — ENCOUNTER SYMPTOMS
CONSTIPATION: 0
SORE THROAT: 0
WHEEZING: 0
VOMITING: 0
COUGH: 1
NAUSEA: 0
ABDOMINAL PAIN: 0
BACK PAIN: 0
DIARRHEA: 0
SHORTNESS OF BREATH: 1

## 2019-06-30 ASSESSMENT — PAIN DESCRIPTION - ORIENTATION
ORIENTATION: RIGHT;LEFT;LOWER

## 2019-06-30 ASSESSMENT — PAIN DESCRIPTION - ONSET
ONSET: ON-GOING

## 2019-06-30 ASSESSMENT — PAIN DESCRIPTION - PAIN TYPE
TYPE: ACUTE PAIN

## 2019-06-30 ASSESSMENT — PAIN DESCRIPTION - DESCRIPTORS
DESCRIPTORS: ACHING;DULL

## 2019-06-30 NOTE — PROGRESS NOTES
[facility] [place]     HISTORY AND PHYSICAL EXAMINATION            Date:   6/30/2019  Patientname:  Orlin Nobles  Date of admission:  6/27/2019  9:15 PM  MRN:   [id number]  Account:  [de-identified]  YOB: 1945  PCP:    Martin Valdez MD  Room:   2110/2110-01  Code Status:    Full Code    CHIEF COMPLAINT     Chief Complaint   Patient presents with    Respiratory Distress     HISTORY OF PRESENT ILLNESS  (Character, Onset, Location, Duration,  Exacerbating/RelievingFactors, Radiation,   Associated Symptoms, Severity )      The patient is a 74 y.o.  female with a history of CAD and COPD, who presents from the [facility] with respiratory distress. According to patient, she has been having a frequent, nonproductive cough for the past 7 to 10 days. ED reports that patient developed increased dyspnea and noted O2 sats to be in the 60s on 2 L prior to arrival from [facility]. Patient reported that she was diagnosed with pneumonia 2 days prior and has been taking antibiotics. Symptoms are associated with fever and nonproductive cough. Denies chills, chest pain, abdominal pain, nausea, vomiting, diarrhea, and urinary symptoms. There are no aggravating or alleviating factors. Symptoms are reported as constant and moderate.     PAST MEDICAL HISTORY   Patient  has a past medical history of AMF (acute myelofibrosis) ([address] Anxiety, ARF (acute renal failure) ([address] Arthritis, CAD (coronary artery disease), Cancer of tonsil ([address] Colon polyp, COPD (chronic obstructive pulmonary disease) ([address] Dehydration, Depression, Diabetes mellitus type 2, controlled ([address] DJD (degenerative joint disease), Encephalopathy, Examination of participant in clinical trial, GERD (gastroesophageal reflux disease), Guaiac positive stools, Herpes, Hyperkalemia, Hyperplastic polyp of stomach, Hypertension, Hypothyroid, Multiple gastric polyps, Obesity, Positive FIT (fecal immunochemical test), Seizure ([address] mouth 2 times daily. Yes Historical Provider, MD   omeprazole (PRILOSEC) 20 MG delayed release capsule Take 20 mg by mouth daily   Yes Historical Provider, MD   Phenylephrine HCl 10 MG TABS Take 10 mg by mouth every 8 hours as needed (congestion)   Yes Historical Provider, MD   benzonatate (TESSALON) 100 MG capsule Take 100 mg by mouth every 8 hours as needed for Cough   Yes Historical Provider, MD   ranitidine (ZANTAC) 150 MG tablet Take 150 mg by mouth daily   Yes Historical Provider, MD   Calcium Carb-Cholecalciferol (CALTRATE 600+D) 600-800 MG-UNIT TABS Take 1 tablet by mouth daily   Yes Historical Provider, MD   Cranberry 475 MG CAPS Take 2 tablets by mouth daily   Yes Historical Provider, MD   insulin glargine (LANTUS) 100 UNIT/ML injection vial Inject 20 Units into the skin daily   Yes Historical Provider, MD   insulin glargine (LANTUS) 100 UNIT/ML injection vial Inject 5 Units into the skin daily    Yes Historical Provider, MD   insulin aspart (NOVOLOG) 100 UNIT/ML injection vial Inject 12 Units into the skin Daily with supper    Yes Historical Provider, MD   losartan (COZAAR) 25 MG tablet Take 25 mg by mouth daily   Yes Historical Provider, MD   Dulaglutide (TRULICITY) 1.5 DW/9.6NC SOPN Inject 1 Dose into the skin once a week fridays   Yes Historical Provider, MD   furosemide (LASIX) 20 MG tablet Take 1 tablet by mouth daily. Patient taking differently: Take 40 mg by mouth daily  1/8/15  Yes Reyna Garcia MD   aspirin 81 MG EC tablet Take 1 tablet by mouth daily. 12/27/14  Yes Marti Trinidad MD   metoprolol (LOPRESSOR) 50 MG tablet Take 1 tablet by mouth 2 times daily. 12/27/14  Yes Marti Trinidad MD   atorvastatin (LIPITOR) 20 MG tablet Take 20 mg by mouth daily. Yes Historical Provider, MD   cetirizine (ZYRTEC) 10 MG tablet Take 10 mg by mouth daily. Yes Historical Provider, MD   citalopram (CELEXA) 20 MG tablet Take 20 mg by mouth daily.    Yes Historical Provider, MD   fluticasone (FLONASE) 50 MCG/ACT nasal spray 2 sprays by Nasal route daily. Yes Historical Provider, MD   gabapentin (NEURONTIN) 300 MG capsule Take 300 mg by mouth 3 times daily. Yes Historical Provider, MD   Insulin Aspart (NOVOLOG SC) Inject 6 Units into the skin 2 times daily Indications: Sliding scale Breakfast and lunch   Yes Historical Provider, MD   levETIRAcetam (KEPPRA) 500 MG tablet Take 500 mg by mouth 2 times daily. Yes Historical Provider, MD   levothyroxine (SYNTHROID) 100 MCG tablet Take 100 mcg by mouth Daily. Yes Historical Provider, MD   Multiple Vitamins-Minerals (MULTIVITAMIN PO) Take 1 tablet by mouth daily. Yes Historical Provider, MD   mupirocin (BACTROBAN) 2 % ointment Apply topically 2 times daily Apply to nares topically two times a day for nasal dryness. Yes Historical Provider, MD   bisacodyl (DULCOLAX) 10 MG suppository Place 10 mg rectally daily as needed for Constipation. Indications: for constipation   Yes Historical Provider, MD   diphenhydrAMINE (BENADRYL) 25 MG capsule Take 25 mg by mouth every 6 hours as needed for Itching. Yes Historical Provider, MD   docusate sodium (COLACE) 100 MG capsule Take 100 mg by mouth 2 times daily as needed for Constipation. Indications: for constipation   Yes Historical Provider, MD   cycloSPORINE (RESTASIS) 0.05 % ophthalmic emulsion Place 1 drop into both eyes 2 times daily. Historical Provider, MD       ALLERGIES      Dye [iodides]; Ciprofloxacin; Eggs or egg-derived products; Mushroom extract complex; and Sulfa antibiotics    REVIEW OF SYSTEMS     Review of Systems   Constitutional: Positive for fever. Negative for chills and diaphoresis. HENT: Negative for congestion and sore throat. Respiratory: Positive for cough and shortness of breath. Negative for wheezing. Cardiovascular: Negative for chest pain, palpitations and leg swelling. Gastrointestinal: Negative for abdominal pain, constipation, diarrhea, nausea and vomiting. Duration 112 ms    Q-T Interval 370 ms    QTc Calculation (Bazett) 426 ms    P Axis 37 degrees    R Axis 87 degrees    T Axis 49 degrees   Narrative:     Normal sinus rhythm  Low voltage QRS  Incomplete right bundle branch block  Borderline ECG  No previous ECGs available     Labs:  CBC:   Recent Labs     06/28/19  0601 06/29/19  0515 06/30/19  0447   WBC 13.0* 12.3* 13.1*   HGB 9.4* 9.5* 9.9*    228 257     BMP:    Recent Labs     06/28/19  0601 06/29/19  0515 06/30/19  0447    135 138   K 4.7 5.5* 4.8   CL 96* 99 101   CO2 28 25 27   BUN 34* 37* 46*   CREATININE 2.18* 2.01* 2.06*   GLUCOSE 159* 226* 196*     S. Calcium:  Recent Labs     06/30/19 0447   CALCIUM 8.9     S. Ionized Calcium:No results for input(s): IONCA in the last 72 hours. S. Magnesium:No results for input(s): MG in the last 72 hours. S. Phosphorus:No results for input(s): PHOS in the last 72 hours. S. Glucose:  Recent Labs     06/29/19  1729 06/29/19  1916 06/30/19  0744   POCGLU 231* 219* 152*     Glycosylated hemoglobin A1C:   Lab Results   Component Value Date    LABA1C 6.6 01/01/2015     Hepatic:   Recent Labs     06/27/19 2135   AST 19   ALT 12   ALKPHOS 162*     CARDIAC ENZY:   Recent Labs     06/27/19  2135 06/28/19  0014 06/28/19  0601   TROPHS 21* 24* 25*     INR:   Recent Labs     06/27/19  2135   INR 1.1     BNP:   Recent Labs     06/27/19 2135   PROBNP 1,062*      ABGs: No results for input(s): PH, PCO2, PO2, HCO3, O2SAT in the last 72 hours. Lipids: No results for input(s): CHOL, TRIG, HDL, LDLCALC in the last 72 hours. Invalid input(s): LDL  Pancreatic functions:No results for input(s): LIPASE, AMYLASE in the last 72 hours. [place]: No results for input(s): LACTA in the last 72 hours.   Thyroid functions:   Lab Results   Component Value Date    TSH 1.38 01/01/2015      U/A:  Recent Labs     06/28/19  0140   COLORU YELLOW   WBCUA 20 TO 50   RBCUA 0 TO 2   MUCUS NOT REPORTED   BACTERIA MANY*   Astrid Ripa

## 2019-06-30 NOTE — CONSULTS
in clinical trial 01/02/2015    Study completed 02/04/2015    GERD (gastroesophageal reflux disease)     Guaiac positive stools     Herpes     Hyperkalemia     Hyperplastic polyp of stomach     Hypertension     Hypothyroid     Multiple gastric polyps     Obesity     Positive FIT (fecal immunochemical test)     Seizure (HCC)     Tubular adenoma of colon     Unspecified cerebral artery occlusion with cerebral infarction     Unspecified diseases of blood and blood-forming organs     Blood infections       Past Surgical History:        Procedure Laterality Date    APPENDECTOMY      CHOLECYSTECTOMY      COLONOSCOPY  03/20/2017    ONE 6 mm cecal polyp and few diverticula of L colon - path: tubular adenoma    HYSTERECTOMY      MS COLONOSCOPY W/BIOPSY SINGLE/MULTIPLE N/A 3/20/2017    COLONOSCOPY with cold biopsy and photos performed by Marianela West MD at [address] EGD TRANSORAL BIOPSY SINGLE/MULTIPLE N/A 3/20/2017    EGD ESOPHAGOGASTRODUODENOSCOPY with cold biopsy and photos performed by Marianela West MD at [facility]  03/20/2017    3 gastric polyps (4-6 mm) polyps were erythematous and edematous with superficial hemorrhage.  PATH: hyperplastic polyp       Current Medications:      diphenhydrAMINE (BENADRYL) tablet 25 mg BID   doxycycline monohydrate (MONODOX) capsule 100 mg 2 times per day   heparin (porcine) injection 5,000 Units 3 times per day   sodium chloride flush 0.9 % injection 10 mL 2 times per day   sodium chloride flush 0.9 % injection 10 mL PRN   acetaminophen (TYLENOL) tablet 650 mg Q4H PRN   atorvastatin (LIPITOR) tablet 20 mg Daily   benzonatate (TESSALON) capsule 100 mg Q8H PRN   cetirizine (ZYRTEC) tablet 5 mg Daily   citalopram (CELEXA) tablet 20 mg Daily   docusate sodium (COLACE) capsule 100 mg BID PRN   ferrous sulfate tablet 325 mg BID   fluticasone (FLONASE) 50 MCG/ACT nasal spray 2 spray Daily   pantoprazole (PROTONIX) Normal   [facility] NEGATIVE   AMORPHOUS 1+*         Radiology:  Ct chest with out contrast  Impression   Multifocal consolidative airspace disease with associated atelectasis, most   consistent with multifocal pneumonia given clinical history and leukocytosis. This distribution of airspace disease, however, is somewhat similar to exam   from 07/18/2013.  Without any intervening cross-sectional imaging, cannot   exclude underlying malignancy with concomitant pneumonia.  This should be   followed up to resolution with 12 week follow-up CT chest.  If any interval   imaging becomes available for comparison, an addendum may be made.       Mediastinal lymphadenopathy is presumably reactive and related to the above   although cannot exclude metastatic disease. Assessment:  Admitted with cough shortness of breath suspected multi focal pneumonia. 1.  LEE on CKD non oliguric, most likely secondary to prerenal azotemia, use of losartan r/o urinary retention. 2. CKD stage III creatinine 1.6 to 1.8 mg/dL most likely secondary to diabetic nephropathy  3. Type 2 diabetes, insulin-dependent diabetes mellitus  4. Essential hypertension       Plan:  Gentle hydration. Post void bladder scan  Most likely serum creatinine is near baseline. Continue to hold losartan for now  Antibiotics management per primary team      Thank you for the consultation.       Electronically signed by [facility], MD on 6/30/2019 at 1:56 PM

## 2019-07-01 ENCOUNTER — APPOINTMENT (OUTPATIENT)
Dept: ULTRASOUND IMAGING | Age: 74
DRG: 193 | End: 2019-07-01
Payer: MEDICARE

## 2019-07-01 LAB
ALLEN TEST: ABNORMAL
ALLEN TEST: ABNORMAL
ANION GAP SERPL CALCULATED.3IONS-SCNC: 13 MMOL/L (ref 9–17)
BUN BLDV-MCNC: 42 MG/DL (ref 8–23)
BUN/CREAT BLD: ABNORMAL (ref 9–20)
CALCIUM SERPL-MCNC: 8.7 MG/DL (ref 8.6–10.4)
CARBOXYHEMOGLOBIN: 1.2 % (ref 0–5)
CARBOXYHEMOGLOBIN: 1.3 % (ref 0–5)
CHLORIDE BLD-SCNC: 105 MMOL/L (ref 98–107)
CO2: 23 MMOL/L (ref 20–31)
CREAT SERPL-MCNC: 1.62 MG/DL (ref 0.5–0.9)
FIO2: 40
FIO2: ABNORMAL
GFR AFRICAN AMERICAN: 38 ML/MIN
GFR NON-AFRICAN AMERICAN: 31 ML/MIN
GFR SERPL CREATININE-BSD FRML MDRD: ABNORMAL ML/MIN/{1.73_M2}
GFR SERPL CREATININE-BSD FRML MDRD: ABNORMAL ML/MIN/{1.73_M2}
GLUCOSE BLD-MCNC: 111 MG/DL (ref 65–105)
GLUCOSE BLD-MCNC: 130 MG/DL (ref 65–105)
GLUCOSE BLD-MCNC: 148 MG/DL (ref 70–99)
GLUCOSE BLD-MCNC: 161 MG/DL (ref 65–105)
GLUCOSE BLD-MCNC: 165 MG/DL (ref 65–105)
HCO3 ARTERIAL: 28.3 MMOL/L (ref 22–26)
HCO3 ARTERIAL: 29.1 MMOL/L (ref 22–26)
HCT VFR BLD CALC: 31.7 % (ref 36–46)
HEMOGLOBIN: 10.1 G/DL (ref 12–16)
MCH RBC QN AUTO: 29.4 PG (ref 26–34)
MCHC RBC AUTO-ENTMCNC: 31.8 G/DL (ref 31–37)
MCV RBC AUTO: 92.2 FL (ref 80–100)
METHEMOGLOBIN: 0.2 % (ref 0–1.9)
METHEMOGLOBIN: 0.2 % (ref 0–1.9)
MODE: ABNORMAL
MODE: ABNORMAL
MYCOPLASMA PNEUMONIAE IGM: 1.3
NEGATIVE BASE EXCESS, ART: ABNORMAL MMOL/L (ref 0–2)
NEGATIVE BASE EXCESS, ART: ABNORMAL MMOL/L (ref 0–2)
NOTIFICATION TIME: ABNORMAL
NOTIFICATION TIME: ABNORMAL
NOTIFICATION: ABNORMAL
NOTIFICATION: ABNORMAL
NRBC AUTOMATED: ABNORMAL PER 100 WBC
O2 DEVICE/FLOW/%: ABNORMAL
O2 DEVICE/FLOW/%: ABNORMAL
O2 SAT, ARTERIAL: 92.6 % (ref 95–98)
O2 SAT, ARTERIAL: 94.3 % (ref 95–98)
OXYHEMOGLOBIN: ABNORMAL % (ref 95–98)
OXYHEMOGLOBIN: ABNORMAL % (ref 95–98)
PATIENT TEMP: 37
PATIENT TEMP: 37
PCO2 ARTERIAL: 52.8 MMHG (ref 35–45)
PCO2 ARTERIAL: 52.9 MMHG (ref 35–45)
PCO2, ART, TEMP ADJ: ABNORMAL (ref 35–45)
PCO2, ART, TEMP ADJ: ABNORMAL (ref 35–45)
PDW BLD-RTO: 14.1 % (ref 11.5–14.9)
PEEP/CPAP: ABNORMAL
PEEP/CPAP: ABNORMAL
PH ARTERIAL: 7.34 (ref 7.35–7.45)
PH ARTERIAL: 7.35 (ref 7.35–7.45)
PH, ART, TEMP ADJ: ABNORMAL (ref 7.35–7.45)
PH, ART, TEMP ADJ: ABNORMAL (ref 7.35–7.45)
PLATELET # BLD: 275 K/UL (ref 150–450)
PMV BLD AUTO: 8.1 FL (ref 6–12)
PO2 ARTERIAL: 71.1 MMHG (ref 80–100)
PO2 ARTERIAL: 80 MMHG (ref 80–100)
PO2, ART, TEMP ADJ: ABNORMAL MMHG (ref 80–100)
PO2, ART, TEMP ADJ: ABNORMAL MMHG (ref 80–100)
POSITIVE BASE EXCESS, ART: 2.5 MMOL/L (ref 0–2)
POSITIVE BASE EXCESS, ART: 3.4 MMOL/L (ref 0–2)
POTASSIUM SERPL-SCNC: 4.3 MMOL/L (ref 3.7–5.3)
PSV: ABNORMAL
PSV: ABNORMAL
PT. POSITION: ABNORMAL
PT. POSITION: ABNORMAL
RBC # BLD: 3.43 M/UL (ref 4–5.2)
RESPIRATORY RATE: 16
RESPIRATORY RATE: 22
SAMPLE SITE: ABNORMAL
SAMPLE SITE: ABNORMAL
SET RATE: ABNORMAL
SET RATE: ABNORMAL
SODIUM BLD-SCNC: 141 MMOL/L (ref 135–144)
TEXT FOR RESPIRATORY: ABNORMAL
TEXT FOR RESPIRATORY: ABNORMAL
TOTAL HB: ABNORMAL G/DL (ref 12–16)
TOTAL HB: ABNORMAL G/DL (ref 12–16)
TOTAL RATE: ABNORMAL
TOTAL RATE: ABNORMAL
VT: ABNORMAL
VT: ABNORMAL
WBC # BLD: 11.2 K/UL (ref 3.5–11)

## 2019-07-01 PROCEDURE — 82947 ASSAY GLUCOSE BLOOD QUANT: CPT

## 2019-07-01 PROCEDURE — 97110 THERAPEUTIC EXERCISES: CPT

## 2019-07-01 PROCEDURE — 6360000002 HC RX W HCPCS: Performed by: INTERNAL MEDICINE

## 2019-07-01 PROCEDURE — 76775 US EXAM ABDO BACK WALL LIM: CPT

## 2019-07-01 PROCEDURE — 99232 SBSQ HOSP IP/OBS MODERATE 35: CPT | Performed by: INTERNAL MEDICINE

## 2019-07-01 PROCEDURE — 36600 WITHDRAWAL OF ARTERIAL BLOOD: CPT

## 2019-07-01 PROCEDURE — 6370000000 HC RX 637 (ALT 250 FOR IP): Performed by: NURSE PRACTITIONER

## 2019-07-01 PROCEDURE — 6370000000 HC RX 637 (ALT 250 FOR IP): Performed by: INTERNAL MEDICINE

## 2019-07-01 PROCEDURE — 2580000003 HC RX 258: Performed by: INTERNAL MEDICINE

## 2019-07-01 PROCEDURE — 97530 THERAPEUTIC ACTIVITIES: CPT

## 2019-07-01 PROCEDURE — 82805 BLOOD GASES W/O2 SATURATION: CPT

## 2019-07-01 PROCEDURE — 80048 BASIC METABOLIC PNL TOTAL CA: CPT

## 2019-07-01 PROCEDURE — 94660 CPAP INITIATION&MGMT: CPT

## 2019-07-01 PROCEDURE — 85027 COMPLETE CBC AUTOMATED: CPT

## 2019-07-01 PROCEDURE — 36415 COLL VENOUS BLD VENIPUNCTURE: CPT

## 2019-07-01 PROCEDURE — 94760 N-INVAS EAR/PLS OXIMETRY 1: CPT

## 2019-07-01 PROCEDURE — 2000000000 HC ICU R&B

## 2019-07-01 PROCEDURE — 94640 AIRWAY INHALATION TREATMENT: CPT

## 2019-07-01 RX ORDER — IPRATROPIUM BROMIDE AND ALBUTEROL SULFATE 2.5; .5 MG/3ML; MG/3ML
1 SOLUTION RESPIRATORY (INHALATION) EVERY 4 HOURS
Status: DISCONTINUED | OUTPATIENT
Start: 2019-07-01 | End: 2019-07-04

## 2019-07-01 RX ORDER — METHYLPREDNISOLONE SODIUM SUCCINATE 40 MG/ML
40 INJECTION, POWDER, LYOPHILIZED, FOR SOLUTION INTRAMUSCULAR; INTRAVENOUS EVERY 6 HOURS
Status: DISCONTINUED | OUTPATIENT
Start: 2019-07-01 | End: 2019-07-03

## 2019-07-01 RX ADMIN — LEVOTHYROXINE SODIUM 100 MCG: 100 TABLET ORAL at 06:25

## 2019-07-01 RX ADMIN — GABAPENTIN 300 MG: 300 CAPSULE ORAL at 09:43

## 2019-07-01 RX ADMIN — IPRATROPIUM BROMIDE AND ALBUTEROL SULFATE 1 AMPULE: .5; 3 SOLUTION RESPIRATORY (INHALATION) at 20:08

## 2019-07-01 RX ADMIN — HEPARIN SODIUM 5000 UNITS: 5000 INJECTION INTRAVENOUS; SUBCUTANEOUS at 13:38

## 2019-07-01 RX ADMIN — HYDROCODONE BITARTRATE AND ACETAMINOPHEN 1 TABLET: 5; 325 TABLET ORAL at 09:44

## 2019-07-01 RX ADMIN — METHYLPREDNISOLONE SODIUM SUCCINATE 40 MG: 40 INJECTION, POWDER, LYOPHILIZED, FOR SOLUTION INTRAMUSCULAR; INTRAVENOUS at 17:37

## 2019-07-01 RX ADMIN — METOPROLOL TARTRATE 50 MG: 50 TABLET ORAL at 21:16

## 2019-07-01 RX ADMIN — HEPARIN SODIUM 5000 UNITS: 5000 INJECTION INTRAVENOUS; SUBCUTANEOUS at 20:56

## 2019-07-01 RX ADMIN — LEVETIRACETAM 500 MG: 500 TABLET, FILM COATED ORAL at 09:44

## 2019-07-01 RX ADMIN — PREDNISONE 40 MG: 20 TABLET ORAL at 09:44

## 2019-07-01 RX ADMIN — IPRATROPIUM BROMIDE AND ALBUTEROL SULFATE 1 AMPULE: .5; 3 SOLUTION RESPIRATORY (INHALATION) at 23:26

## 2019-07-01 RX ADMIN — CITALOPRAM HYDROBROMIDE 20 MG: 20 TABLET ORAL at 09:44

## 2019-07-01 RX ADMIN — FERROUS SULFATE TAB 325 MG (65 MG ELEMENTAL FE) 325 MG: 325 (65 FE) TAB at 20:56

## 2019-07-01 RX ADMIN — HYDROCODONE BITARTRATE AND ACETAMINOPHEN 1 TABLET: 5; 325 TABLET ORAL at 20:57

## 2019-07-01 RX ADMIN — FERROUS SULFATE TAB 325 MG (65 MG ELEMENTAL FE) 325 MG: 325 (65 FE) TAB at 09:44

## 2019-07-01 RX ADMIN — LEVETIRACETAM 500 MG: 500 TABLET, FILM COATED ORAL at 20:56

## 2019-07-01 RX ADMIN — METHYLPREDNISOLONE SODIUM SUCCINATE 40 MG: 40 INJECTION, POWDER, LYOPHILIZED, FOR SOLUTION INTRAMUSCULAR; INTRAVENOUS at 13:38

## 2019-07-01 RX ADMIN — INSULIN GLARGINE 20 UNITS: 100 INJECTION, SOLUTION SUBCUTANEOUS at 09:45

## 2019-07-01 RX ADMIN — INSULIN LISPRO 1 UNITS: 100 INJECTION, SOLUTION INTRAVENOUS; SUBCUTANEOUS at 20:56

## 2019-07-01 RX ADMIN — PANTOPRAZOLE SODIUM 40 MG: 40 TABLET, DELAYED RELEASE ORAL at 06:25

## 2019-07-01 RX ADMIN — IPRATROPIUM BROMIDE AND ALBUTEROL SULFATE 1 AMPULE: .5; 3 SOLUTION RESPIRATORY (INHALATION) at 15:27

## 2019-07-01 RX ADMIN — Medication 10 ML: at 21:10

## 2019-07-01 RX ADMIN — DOXYCYCLINE 100 MG: 100 CAPSULE ORAL at 09:43

## 2019-07-01 RX ADMIN — GUAIFENESIN 600 MG: 600 TABLET, EXTENDED RELEASE ORAL at 20:56

## 2019-07-01 RX ADMIN — INSULIN GLARGINE 5 UNITS: 100 INJECTION, SOLUTION SUBCUTANEOUS at 20:56

## 2019-07-01 RX ADMIN — AZITHROMYCIN MONOHYDRATE 500 MG: 500 INJECTION, POWDER, LYOPHILIZED, FOR SOLUTION INTRAVENOUS at 13:38

## 2019-07-01 RX ADMIN — CETIRIZINE HYDROCHLORIDE 5 MG: 10 TABLET, FILM COATED ORAL at 09:43

## 2019-07-01 RX ADMIN — FLUTICASONE PROPIONATE 2 SPRAY: 50 SPRAY, METERED NASAL at 09:44

## 2019-07-01 RX ADMIN — ATORVASTATIN CALCIUM 20 MG: 20 TABLET, FILM COATED ORAL at 09:44

## 2019-07-01 RX ADMIN — GUAIFENESIN 600 MG: 600 TABLET, EXTENDED RELEASE ORAL at 09:44

## 2019-07-01 RX ADMIN — IPRATROPIUM BROMIDE AND ALBUTEROL SULFATE 3 ML: .5; 3 SOLUTION RESPIRATORY (INHALATION) at 06:44

## 2019-07-01 RX ADMIN — METOPROLOL TARTRATE 50 MG: 50 TABLET ORAL at 09:43

## 2019-07-01 RX ADMIN — DIPHENHYDRAMINE HCL 25 MG: 25 TABLET ORAL at 09:43

## 2019-07-01 RX ADMIN — INSULIN LISPRO 2 UNITS: 100 INJECTION, SOLUTION INTRAVENOUS; SUBCUTANEOUS at 16:34

## 2019-07-01 ASSESSMENT — PAIN SCALES - GENERAL
PAINLEVEL_OUTOF10: 0
PAINLEVEL_OUTOF10: 10
PAINLEVEL_OUTOF10: 0
PAINLEVEL_OUTOF10: 6
PAINLEVEL_OUTOF10: 2

## 2019-07-01 NOTE — PROGRESS NOTES
Patient is alert but oriented to self and time, disoriented to place and reason for being here. Patient talking about the \"fish that washed up behind the urinal\", as she pointed to the trash can. Patient also stated, \"do you know what's depressing? All of these people that are here are dead. They are all dead people. \" Will continue to monitor.

## 2019-07-01 NOTE — CARE COORDINATION
BPCI-A Medical Bundle Patient. Working DR-pneumonia  Admitting Location: [facility]    90-day post-acute tracking and outreach with qualifying DRG from date of discharge.

## 2019-07-01 NOTE — FLOWSHEET NOTE
07/01/19 1925   Encounter Summary   Services provided to: Patient   Referral/Consult From: Rounding   Complexity of Encounter Low   Length of Encounter 15 minutes   Spiritual/Oriental orthodox   Type Spiritual support   Assessment Sleeping   Intervention Prayer   Outcome Did not respond

## 2019-07-01 NOTE — PROGRESS NOTES
RN notified DR. Travon Infante of patients drowsiness and confusion. RN also updated him on the medications that the patient received. He said to hold the next dose of Neurontin, get a stat blood gas and transfer to ICU.

## 2019-07-01 NOTE — CARE COORDINATION
DISCHARGE PLANNING NOTE:    Plan remains for patient to be discharged back to [facility] where she is a bed hold and can return when ready. Active order for IV Zithro and IV solu-medrol 40mg every 6 hours. Will continue to follow for additional d/c needs.     Electronically signed by Gabby Matute RN on 7/1/2019 at 12:01 PM

## 2019-07-01 NOTE — CONSULTS
HISTORY:  According to the chart, it states that she is a  nonsmoker. No alcohol use or drug use. FAMILY HISTORY:  Unknown. REVIEW OF SYSTEMS:  Cannot be obtained. PHYSICAL EXAMINATION:  GENERAL APPEARANCE:  Morbidly obese female with a BMI 51, currently  lethargic as noted above. VITALS:  Temperature 97.3, respiratory 16, pulse is 60, blood pressure  is 133/49, oxygen saturation is 90%-93% on room air. HEENT:  She has micrognathic and retrognathic. Her tongue appears to be  enlarged. NECK:  Size easily 16 to 17 inches in diameter. LUNGS:  Diminished with decreased breath sounds. CARDIAC:  S1, S2.  ABDOMINAL EXAM:  She is morbidly obese. EXTREMITIES:  Show trace edema. NEUROLOGIC:  There are no focal deficits. DIAGNOSTIC DATA:  Her x-ray which I personally reviewed shows bilateral  patchy infiltrates as well as a CAT scan. As mentioned above, her  mycoplasma IgM is positive at 1.30. LABORATORY DATA:  BUN is 42, creatinine 1.62 which is improving. White  count is 11.2, with an H and H of 10.1 and 31.7, and platelet count of  007. I have been told that she has lost IV access and she has not gotten any  IV antibiotics since 06/29/2019 and she has only gotten one dose of oral  doxycycline this morning. RECOMMENDATIONS:  My recommendation would be to get an arterial blood  gas and move her to the ICU or intermediate unit, place her on regularly  scheduled breathing treatments. For now, I will use DuoNebs because we  need to get clarification whether or not she truly smoke or not. Quite  frankly, I am not sure if she truly has even COPD. This may also be  obesity hypoventilation syndrome/sleep apnea. I would also put her on IV steroids, Solu-Medrol 40 q.6h. We will need  to watch her blood sugars which they are being monitored closely as it  is. She will need DVT prophylaxis which she is already on getting subcu  heparin. We will get a followup x-ray.     Additionally and most importantly, she will need to be on BiPAP. For  now, we will start her on 14/6 and we will get a followup blood gas down  the road. Further recommendations follow. Critical care time spent was  35 minutes. There is no family here. Case was discussed with the  nursing staff. The patient is also with worsening renal failure and the  fact that she is on Neurontin, may have also contributed to her  lethargy.         Ruthann Morel    D: 07/01/2019 12:07:09       T: 07/01/2019 12:10:59     [email or web address]  Job#: [id number]     Doc#: [id number]    CC:

## 2019-07-02 LAB
ALLEN TEST: ABNORMAL
ANION GAP SERPL CALCULATED.3IONS-SCNC: 13 MMOL/L (ref 9–17)
BUN BLDV-MCNC: 44 MG/DL (ref 8–23)
BUN/CREAT BLD: ABNORMAL (ref 9–20)
CALCIUM SERPL-MCNC: 9 MG/DL (ref 8.6–10.4)
CARBOXYHEMOGLOBIN: 1 % (ref 0–5)
CHLORIDE BLD-SCNC: 104 MMOL/L (ref 98–107)
CO2: 23 MMOL/L (ref 20–31)
CREAT SERPL-MCNC: 1.5 MG/DL (ref 0.5–0.9)
FIO2: ABNORMAL
GFR AFRICAN AMERICAN: 41 ML/MIN
GFR NON-AFRICAN AMERICAN: 34 ML/MIN
GFR SERPL CREATININE-BSD FRML MDRD: ABNORMAL ML/MIN/{1.73_M2}
GFR SERPL CREATININE-BSD FRML MDRD: ABNORMAL ML/MIN/{1.73_M2}
GLUCOSE BLD-MCNC: 162 MG/DL (ref 65–105)
GLUCOSE BLD-MCNC: 168 MG/DL (ref 70–99)
GLUCOSE BLD-MCNC: 190 MG/DL (ref 65–105)
GLUCOSE BLD-MCNC: 212 MG/DL (ref 65–105)
GLUCOSE BLD-MCNC: 232 MG/DL (ref 65–105)
HCO3 ARTERIAL: 27 MMOL/L (ref 22–26)
HCT VFR BLD CALC: 31.9 % (ref 36–46)
HEMOGLOBIN: 10.3 G/DL (ref 12–16)
MCH RBC QN AUTO: 29.7 PG (ref 26–34)
MCHC RBC AUTO-ENTMCNC: 32.2 G/DL (ref 31–37)
MCV RBC AUTO: 92.2 FL (ref 80–100)
METHEMOGLOBIN: 0.1 % (ref 0–1.9)
MODE: ABNORMAL
NEGATIVE BASE EXCESS, ART: ABNORMAL MMOL/L (ref 0–2)
NOTIFICATION TIME: ABNORMAL
NOTIFICATION: ABNORMAL
NRBC AUTOMATED: ABNORMAL PER 100 WBC
O2 DEVICE/FLOW/%: ABNORMAL
O2 SAT, ARTERIAL: 97.9 % (ref 95–98)
OXYHEMOGLOBIN: ABNORMAL % (ref 95–98)
PATIENT TEMP: ABNORMAL
PCO2 ARTERIAL: 46 MMHG (ref 35–45)
PCO2, ART, TEMP ADJ: ABNORMAL (ref 35–45)
PDW BLD-RTO: 14.1 % (ref 11.5–14.9)
PEEP/CPAP: ABNORMAL
PH ARTERIAL: 7.38 (ref 7.35–7.45)
PH, ART, TEMP ADJ: ABNORMAL (ref 7.35–7.45)
PLATELET # BLD: 312 K/UL (ref 150–450)
PMV BLD AUTO: 8 FL (ref 6–12)
PO2 ARTERIAL: 110 MMHG (ref 80–100)
PO2, ART, TEMP ADJ: ABNORMAL MMHG (ref 80–100)
POSITIVE BASE EXCESS, ART: 1.8 MMOL/L (ref 0–2)
POTASSIUM SERPL-SCNC: 4.5 MMOL/L (ref 3.7–5.3)
POTASSIUM SERPL-SCNC: 5.4 MMOL/L (ref 3.7–5.3)
PROCALCITONIN: 0.14 NG/ML
PSV: ABNORMAL
PT. POSITION: ABNORMAL
RBC # BLD: 3.46 M/UL (ref 4–5.2)
RESPIRATORY RATE: 16
SAMPLE SITE: ABNORMAL
SET RATE: ABNORMAL
SODIUM BLD-SCNC: 140 MMOL/L (ref 135–144)
TEXT FOR RESPIRATORY: ABNORMAL
TOTAL HB: ABNORMAL G/DL (ref 12–16)
TOTAL RATE: ABNORMAL
VT: ABNORMAL
WBC # BLD: 11.4 K/UL (ref 3.5–11)

## 2019-07-02 PROCEDURE — 6370000000 HC RX 637 (ALT 250 FOR IP): Performed by: INTERNAL MEDICINE

## 2019-07-02 PROCEDURE — 2000000000 HC ICU R&B

## 2019-07-02 PROCEDURE — 82947 ASSAY GLUCOSE BLOOD QUANT: CPT

## 2019-07-02 PROCEDURE — 6360000002 HC RX W HCPCS: Performed by: INTERNAL MEDICINE

## 2019-07-02 PROCEDURE — 94640 AIRWAY INHALATION TREATMENT: CPT

## 2019-07-02 PROCEDURE — 2700000000 HC OXYGEN THERAPY PER DAY

## 2019-07-02 PROCEDURE — 2580000003 HC RX 258: Performed by: INTERNAL MEDICINE

## 2019-07-02 PROCEDURE — 94761 N-INVAS EAR/PLS OXIMETRY MLT: CPT

## 2019-07-02 PROCEDURE — 84145 PROCALCITONIN (PCT): CPT

## 2019-07-02 PROCEDURE — 84132 ASSAY OF SERUM POTASSIUM: CPT

## 2019-07-02 PROCEDURE — 36415 COLL VENOUS BLD VENIPUNCTURE: CPT

## 2019-07-02 PROCEDURE — 6370000000 HC RX 637 (ALT 250 FOR IP): Performed by: NURSE PRACTITIONER

## 2019-07-02 PROCEDURE — 94660 CPAP INITIATION&MGMT: CPT

## 2019-07-02 PROCEDURE — 82805 BLOOD GASES W/O2 SATURATION: CPT

## 2019-07-02 PROCEDURE — 80048 BASIC METABOLIC PNL TOTAL CA: CPT

## 2019-07-02 PROCEDURE — 85027 COMPLETE CBC AUTOMATED: CPT

## 2019-07-02 PROCEDURE — 99220 PR INITIAL OBSERVATION CARE/DAY 70 MINUTES: CPT | Performed by: INTERNAL MEDICINE

## 2019-07-02 PROCEDURE — 36600 WITHDRAWAL OF ARTERIAL BLOOD: CPT

## 2019-07-02 RX ORDER — SODIUM POLYSTYRENE SULFONATE 4.1 MEQ/G
15 POWDER, FOR SUSPENSION ORAL; RECTAL ONCE
Status: COMPLETED | OUTPATIENT
Start: 2019-07-02 | End: 2019-07-02

## 2019-07-02 RX ADMIN — PANTOPRAZOLE SODIUM 40 MG: 40 TABLET, DELAYED RELEASE ORAL at 05:15

## 2019-07-02 RX ADMIN — BENZONATATE 100 MG: 100 CAPSULE ORAL at 20:31

## 2019-07-02 RX ADMIN — METHYLPREDNISOLONE SODIUM SUCCINATE 40 MG: 40 INJECTION, POWDER, LYOPHILIZED, FOR SOLUTION INTRAMUSCULAR; INTRAVENOUS at 17:43

## 2019-07-02 RX ADMIN — HYDROCODONE BITARTRATE AND ACETAMINOPHEN 1 TABLET: 5; 325 TABLET ORAL at 20:30

## 2019-07-02 RX ADMIN — ATORVASTATIN CALCIUM 20 MG: 20 TABLET, FILM COATED ORAL at 08:19

## 2019-07-02 RX ADMIN — GUAIFENESIN 600 MG: 600 TABLET, EXTENDED RELEASE ORAL at 20:30

## 2019-07-02 RX ADMIN — INSULIN LISPRO 2 UNITS: 100 INJECTION, SOLUTION INTRAVENOUS; SUBCUTANEOUS at 21:28

## 2019-07-02 RX ADMIN — INSULIN GLARGINE 5 UNITS: 100 INJECTION, SOLUTION SUBCUTANEOUS at 21:28

## 2019-07-02 RX ADMIN — Medication 10 ML: at 08:14

## 2019-07-02 RX ADMIN — HYDROCODONE BITARTRATE AND ACETAMINOPHEN 1 TABLET: 5; 325 TABLET ORAL at 08:18

## 2019-07-02 RX ADMIN — LEVOTHYROXINE SODIUM 100 MCG: 100 TABLET ORAL at 05:15

## 2019-07-02 RX ADMIN — IPRATROPIUM BROMIDE AND ALBUTEROL SULFATE 1 AMPULE: .5; 3 SOLUTION RESPIRATORY (INHALATION) at 03:50

## 2019-07-02 RX ADMIN — Medication 10 ML: at 20:32

## 2019-07-02 RX ADMIN — LEVETIRACETAM 500 MG: 500 TABLET, FILM COATED ORAL at 08:14

## 2019-07-02 RX ADMIN — INSULIN LISPRO 2 UNITS: 100 INJECTION, SOLUTION INTRAVENOUS; SUBCUTANEOUS at 08:17

## 2019-07-02 RX ADMIN — METHYLPREDNISOLONE SODIUM SUCCINATE 40 MG: 40 INJECTION, POWDER, LYOPHILIZED, FOR SOLUTION INTRAMUSCULAR; INTRAVENOUS at 23:51

## 2019-07-02 RX ADMIN — METOPROLOL TARTRATE 50 MG: 50 TABLET ORAL at 08:14

## 2019-07-02 RX ADMIN — INSULIN LISPRO 4 UNITS: 100 INJECTION, SOLUTION INTRAVENOUS; SUBCUTANEOUS at 12:30

## 2019-07-02 RX ADMIN — INSULIN GLARGINE 20 UNITS: 100 INJECTION, SOLUTION SUBCUTANEOUS at 11:10

## 2019-07-02 RX ADMIN — METHYLPREDNISOLONE SODIUM SUCCINATE 40 MG: 40 INJECTION, POWDER, LYOPHILIZED, FOR SOLUTION INTRAMUSCULAR; INTRAVENOUS at 12:30

## 2019-07-02 RX ADMIN — HEPARIN SODIUM 5000 UNITS: 5000 INJECTION INTRAVENOUS; SUBCUTANEOUS at 14:45

## 2019-07-02 RX ADMIN — GUAIFENESIN 600 MG: 600 TABLET, EXTENDED RELEASE ORAL at 08:19

## 2019-07-02 RX ADMIN — IPRATROPIUM BROMIDE AND ALBUTEROL SULFATE 1 AMPULE: .5; 3 SOLUTION RESPIRATORY (INHALATION) at 11:13

## 2019-07-02 RX ADMIN — Medication 1 MG: at 21:28

## 2019-07-02 RX ADMIN — METHYLPREDNISOLONE SODIUM SUCCINATE 40 MG: 40 INJECTION, POWDER, LYOPHILIZED, FOR SOLUTION INTRAMUSCULAR; INTRAVENOUS at 00:24

## 2019-07-02 RX ADMIN — METOPROLOL TARTRATE 50 MG: 50 TABLET ORAL at 20:31

## 2019-07-02 RX ADMIN — IPRATROPIUM BROMIDE AND ALBUTEROL SULFATE 1 AMPULE: .5; 3 SOLUTION RESPIRATORY (INHALATION) at 15:14

## 2019-07-02 RX ADMIN — CITALOPRAM HYDROBROMIDE 20 MG: 20 TABLET ORAL at 08:19

## 2019-07-02 RX ADMIN — GABAPENTIN 300 MG: 300 CAPSULE ORAL at 14:45

## 2019-07-02 RX ADMIN — FERROUS SULFATE TAB 325 MG (65 MG ELEMENTAL FE) 325 MG: 325 (65 FE) TAB at 08:18

## 2019-07-02 RX ADMIN — IPRATROPIUM BROMIDE AND ALBUTEROL SULFATE 1 AMPULE: .5; 3 SOLUTION RESPIRATORY (INHALATION) at 23:22

## 2019-07-02 RX ADMIN — HEPARIN SODIUM 5000 UNITS: 5000 INJECTION INTRAVENOUS; SUBCUTANEOUS at 05:15

## 2019-07-02 RX ADMIN — DOCUSATE SODIUM 100 MG: 100 CAPSULE, LIQUID FILLED ORAL at 20:31

## 2019-07-02 RX ADMIN — LEVETIRACETAM 500 MG: 500 TABLET, FILM COATED ORAL at 20:30

## 2019-07-02 RX ADMIN — FERROUS SULFATE TAB 325 MG (65 MG ELEMENTAL FE) 325 MG: 325 (65 FE) TAB at 20:31

## 2019-07-02 RX ADMIN — GABAPENTIN 300 MG: 300 CAPSULE ORAL at 21:28

## 2019-07-02 RX ADMIN — AZITHROMYCIN MONOHYDRATE 500 MG: 500 INJECTION, POWDER, LYOPHILIZED, FOR SOLUTION INTRAVENOUS at 12:29

## 2019-07-02 RX ADMIN — SODIUM POLYSTYRENE SULFONATE 15 G: 1 POWDER ORAL; RECTAL at 12:22

## 2019-07-02 RX ADMIN — METHYLPREDNISOLONE SODIUM SUCCINATE 40 MG: 40 INJECTION, POWDER, LYOPHILIZED, FOR SOLUTION INTRAMUSCULAR; INTRAVENOUS at 05:15

## 2019-07-02 RX ADMIN — CETIRIZINE HYDROCHLORIDE 5 MG: 10 TABLET, FILM COATED ORAL at 08:19

## 2019-07-02 RX ADMIN — IPRATROPIUM BROMIDE AND ALBUTEROL SULFATE 1 AMPULE: .5; 3 SOLUTION RESPIRATORY (INHALATION) at 19:55

## 2019-07-02 RX ADMIN — HEPARIN SODIUM 5000 UNITS: 5000 INJECTION INTRAVENOUS; SUBCUTANEOUS at 21:28

## 2019-07-02 RX ADMIN — INSULIN LISPRO 2 UNITS: 100 INJECTION, SOLUTION INTRAVENOUS; SUBCUTANEOUS at 17:43

## 2019-07-02 RX ADMIN — FLUTICASONE PROPIONATE 2 SPRAY: 50 SPRAY, METERED NASAL at 08:19

## 2019-07-02 RX ADMIN — SODIUM CHLORIDE: 9 INJECTION, SOLUTION INTRAVENOUS at 20:39

## 2019-07-02 RX ADMIN — IPRATROPIUM BROMIDE AND ALBUTEROL SULFATE 1 AMPULE: .5; 3 SOLUTION RESPIRATORY (INHALATION) at 07:21

## 2019-07-02 ASSESSMENT — PAIN DESCRIPTION - FREQUENCY
FREQUENCY: CONTINUOUS

## 2019-07-02 ASSESSMENT — PAIN DESCRIPTION - DESCRIPTORS
DESCRIPTORS: DULL;ACHING
DESCRIPTORS: DULL;DISCOMFORT
DESCRIPTORS: DULL;ACHING

## 2019-07-02 ASSESSMENT — PAIN DESCRIPTION - PAIN TYPE
TYPE: CHRONIC PAIN

## 2019-07-02 ASSESSMENT — PAIN DESCRIPTION - LOCATION
LOCATION: GENERALIZED

## 2019-07-02 ASSESSMENT — PAIN DESCRIPTION - ONSET
ONSET: ON-GOING

## 2019-07-02 ASSESSMENT — PAIN SCALES - GENERAL
PAINLEVEL_OUTOF10: 0
PAINLEVEL_OUTOF10: 2
PAINLEVEL_OUTOF10: 4
PAINLEVEL_OUTOF10: 3
PAINLEVEL_OUTOF10: 1
PAINLEVEL_OUTOF10: 0

## 2019-07-02 NOTE — PROGRESS NOTES
[facility]    Progress Note    7/2/2019    3:26 PM    Name:   Abdirahman Mariee  MRN:     [id number]     [place]:      [de-identified]   Room:   2013/2013-01  IP Day:  4  Admit Date:  6/27/2019  9:15 PM    PCP:   Williams Mortimer, MD  Code Status:  Full Code    Subjective:     C/C:   Chief Complaint   Patient presents with    Respiratory Distress       HPI:     Overnight no fever positive for chills dyspnea has improved double of rash for some reason she is on Benadryl and falling asleep while in conversation    Review of Systems:     Constitutional: Refers fever chills cough  Respiratory: No fever chills cough and dyspnea, hemoptysis, shortness of breath, wheezing  Cardiovascular:  negative for chest pain, chest pressure/discomfort, lower extremity edema, palpitations  Gastrointestinal:  negative for abdominal pain, constipation, diarrhea, nausea, vomiting  Neurological:  negative for dizziness, headache  Will to mobilize bedbound because of severe obesity  Medications: Allergies:     Allergies   Allergen Reactions    Dye [Iodides] Rash     MRI/CT dye    Ciprofloxacin     Eggs Or Egg-Derived Products     Mushroom Extract Complex     Sulfa Antibiotics        Current Meds:   Scheduled Meds:    azithromycin  500 mg Intravenous Q24H    methylPREDNISolone  40 mg Intravenous Q6H    ipratropium-albuterol  1 ampule Inhalation Q4H    heparin (porcine)  5,000 Units Subcutaneous 3 times per day    sodium chloride flush  10 mL Intravenous 2 times per day    atorvastatin  20 mg Oral Daily    cetirizine  5 mg Oral Daily    citalopram  20 mg Oral Daily    ferrous sulfate  325 mg Oral BID    fluticasone  2 spray Nasal Daily    pantoprazole  40 mg Oral QAM AC    metoprolol tartrate  50 mg Oral BID    levothyroxine  100 mcg Oral Daily    levETIRAcetam  500 mg Oral BID    HYDROcodone-acetaminophen  1 tablet Oral BID    guaiFENesin  600 mg Oral BID    gabapentin

## 2019-07-02 NOTE — PROGRESS NOTES
type 2, controlled ([facility]     DJD (degenerative joint disease)     Encephalopathy     Examination of participant in clinical trial 01/02/2015    Study completed 02/04/2015    GERD (gastroesophageal reflux disease)     Guaiac positive stools     Herpes     Hyperkalemia     Hyperplastic polyp of stomach     Hypertension     Hypothyroid     Multiple gastric polyps     Obesity     Positive FIT (fecal immunochemical test)     Seizure (HCC)     Tubular adenoma of colon     Unspecified cerebral artery occlusion with cerebral infarction     Unspecified diseases of blood and blood-forming organs     Blood infections       Past Surgical History:        Procedure Laterality Date    APPENDECTOMY      CHOLECYSTECTOMY      COLONOSCOPY  03/20/2017    ONE 6 mm cecal polyp and few diverticula of L colon - path: tubular adenoma    HYSTERECTOMY      WA COLONOSCOPY W/BIOPSY SINGLE/MULTIPLE N/A 3/20/2017    COLONOSCOPY with cold biopsy and photos performed by Jose Howe MD at [address] [place] [address] EGD TRANSORAL BIOPSY SINGLE/MULTIPLE N/A 3/20/2017    EGD ESOPHAGOGASTRODUODENOSCOPY with cold biopsy and photos performed by Jose Howe MD at [facility]  03/20/2017    3 gastric polyps (4-6 mm) polyps were erythematous and edematous with superficial hemorrhage.  PATH: hyperplastic polyp       Current Medications:      sodium polystyrene (KAYEXALATE) powder 15 g Once   azithromycin (ZITHROMAX) 500 mg in D5W 250ml addavial Q24H   methylPREDNISolone sodium (SOLU-MEDROL) injection 40 mg Q6H   ipratropium-albuterol (DUONEB) nebulizer solution 1 ampule Q4H   melatonin ER tablet 1 mg Nightly PRN   heparin (porcine) injection 5,000 Units 3 times per day   sodium chloride flush 0.9 % injection 10 mL 2 times per day   sodium chloride flush 0.9 % injection 10 mL PRN   acetaminophen (TYLENOL) tablet 650 mg Q4H PRN   atorvastatin (LIPITOR) tablet 20 mg Daily   benzonatate (TESSALON) capsule 100 mg Q8H PRN   cetirizine (ZYRTEC) tablet 5 mg Daily   citalopram (CELEXA) tablet 20 mg Daily   docusate sodium (COLACE) capsule 100 mg BID PRN   ferrous sulfate tablet 325 mg BID   fluticasone (FLONASE) 50 MCG/ACT nasal spray 2 spray Daily   pantoprazole (PROTONIX) tablet 40 mg QAM AC   metoprolol tartrate (LOPRESSOR) tablet 50 mg BID   loperamide (IMODIUM) capsule 2 mg Q6H PRN   levothyroxine (SYNTHROID) tablet 100 mcg Daily   levETIRAcetam (KEPPRA) tablet 500 mg BID   ipratropium-albuterol (DUONEB) nebulizer solution 3 mL Q4H PRN   HYDROcodone-acetaminophen (NORCO) 5-325 MG per tablet 1 tablet BID   guaiFENesin (MUCINEX) extended release tablet 600 mg BID   gabapentin (NEURONTIN) capsule 300 mg TID   glucose (GLUTOSE) 40 % oral gel 15 g PRN   dextrose 50 % IV solution PRN   glucagon (rDNA) injection 1 mg PRN   dextrose 5 % solution PRN   insulin lispro (HUMALOG) injection vial 0-12 Units TID WC   insulin lispro (HUMALOG) injection vial 0-6 Units Nightly   0.9 % sodium chloride infusion Continuous   ondansetron (ZOFRAN) injection 4 mg Q6H PRN   nicotine (NICODERM CQ) 21 MG/24HR 1 patch Daily PRN   insulin glargine (LANTUS) injection vial 20 Units Daily   insulin glargine (LANTUS) injection vial 5 Units Nightly       Allergies:  Dye [iodides]; Ciprofloxacin; Eggs or egg-derived products;  Mushroom extract complex; and Sulfa antibiotics        Objective:  CURRENT TEMPERATURE:  Temp: 97.7 °F (36.5 °C)  MAXIMUM TEMPERATURE OVER 24HRS:  Temp (24hrs), Av.9 °F (36.6 °C), Min:97.6 °F (36.4 °C), Max:98.2 °F (36.8 °C)    CURRENT RESPIRATORY RATE:  Resp: 28  CURRENT PULSE:  Pulse: 65  CURRENT BLOOD PRESSURE:  BP: (!) 137/39  24HR BLOOD PRESSURE RANGE:  Systolic (76EVT), VBD:894 , Min:94 , OYC:818   ; Diastolic (60XLW), KIV:81, Min:20, Max:111    24HR INTAKE/OUTPUT:      Intake/Output Summary (Last 24 hours) at 2019 1024  Last data filed at 2019 0527  Gross per 24 hour   Intake 766 ml   Output CLARITYU, SPECGRAV, LEUKOCYTESUR, UROBILINOGEN, BILIRUBINUR, BLOODU, GLUCOSEU, KETUA, AMORPHOUS in the last 72 hours. Radiology:  Ct chest with out contrast  Impression   Multifocal consolidative airspace disease with associated atelectasis, most   consistent with multifocal pneumonia given clinical history and leukocytosis. This distribution of airspace disease, however, is somewhat similar to exam   from 07/18/2013.  Without any intervening cross-sectional imaging, cannot   exclude underlying malignancy with concomitant pneumonia.  This should be   followed up to resolution with 12 week follow-up CT chest.  If any interval   imaging becomes available for comparison, an addendum may be made.       Mediastinal lymphadenopathy is presumably reactive and related to the above   although cannot exclude metastatic disease. Assessment:  Admitted with cough shortness of breath suspected multi focal pneumonia. 1.  LEE on CKD non oliguric, most likely secondary to prerenal azotemia, use of losartan r/o urinary retention. 2. CKD stage III creatinine 1.6 to 1.8 mg/dL most likely secondary to diabetic nephropathy  3. Type 2 diabetes, insulin-dependent diabetes mellitus  4. Essential hypertension       Plan:  Gentle hydration. serum creatinine is near baseline. Continue to hold losartan for now  Antibiotics management per primary team      Thank you for the consultation.       Electronically signed by Shant Harrell MD on 7/2/2019 at 10:24 AM

## 2019-07-02 NOTE — CARE COORDINATION
DISCHARGE PLANNING NOTE:    LSW following discharge back to [facility]. Active order for IV Zithro and IV solu-medrol 40mg every 6 hours. Will continue to follow for additional d/c needs.     Electronically signed by Hermes Gomes RN on 7/2/2019 at 3:39 PM

## 2019-07-03 ENCOUNTER — APPOINTMENT (OUTPATIENT)
Dept: GENERAL RADIOLOGY | Age: 74
DRG: 193 | End: 2019-07-03
Payer: MEDICARE

## 2019-07-03 LAB
ANION GAP SERPL CALCULATED.3IONS-SCNC: 11 MMOL/L (ref 9–17)
BUN BLDV-MCNC: 51 MG/DL (ref 8–23)
BUN/CREAT BLD: ABNORMAL (ref 9–20)
CALCIUM SERPL-MCNC: 8.7 MG/DL (ref 8.6–10.4)
CHLORIDE BLD-SCNC: 104 MMOL/L (ref 98–107)
CO2: 25 MMOL/L (ref 20–31)
CREAT SERPL-MCNC: 1.66 MG/DL (ref 0.5–0.9)
GFR AFRICAN AMERICAN: 37 ML/MIN
GFR NON-AFRICAN AMERICAN: 30 ML/MIN
GFR SERPL CREATININE-BSD FRML MDRD: ABNORMAL ML/MIN/{1.73_M2}
GFR SERPL CREATININE-BSD FRML MDRD: ABNORMAL ML/MIN/{1.73_M2}
GLUCOSE BLD-MCNC: 174 MG/DL (ref 65–105)
GLUCOSE BLD-MCNC: 180 MG/DL (ref 65–105)
GLUCOSE BLD-MCNC: 203 MG/DL (ref 65–105)
GLUCOSE BLD-MCNC: 204 MG/DL (ref 70–99)
GLUCOSE BLD-MCNC: 237 MG/DL (ref 65–105)
GLUCOSE BLD-MCNC: 266 MG/DL (ref 65–105)
HCT VFR BLD CALC: 30.5 % (ref 36–46)
HEMOGLOBIN: 10.1 G/DL (ref 12–16)
MCH RBC QN AUTO: 29.6 PG (ref 26–34)
MCHC RBC AUTO-ENTMCNC: 33 G/DL (ref 31–37)
MCV RBC AUTO: 89.8 FL (ref 80–100)
NRBC AUTOMATED: ABNORMAL PER 100 WBC
PDW BLD-RTO: 14.2 % (ref 11.5–14.9)
PLATELET # BLD: 334 K/UL (ref 150–450)
PMV BLD AUTO: 8.5 FL (ref 6–12)
POTASSIUM SERPL-SCNC: 4.3 MMOL/L (ref 3.7–5.3)
RBC # BLD: 3.4 M/UL (ref 4–5.2)
SODIUM BLD-SCNC: 140 MMOL/L (ref 135–144)
WBC # BLD: 9.8 K/UL (ref 3.5–11)

## 2019-07-03 PROCEDURE — 82947 ASSAY GLUCOSE BLOOD QUANT: CPT

## 2019-07-03 PROCEDURE — 6360000002 HC RX W HCPCS: Performed by: INTERNAL MEDICINE

## 2019-07-03 PROCEDURE — 94660 CPAP INITIATION&MGMT: CPT

## 2019-07-03 PROCEDURE — 2580000003 HC RX 258: Performed by: INTERNAL MEDICINE

## 2019-07-03 PROCEDURE — 2060000000 HC ICU INTERMEDIATE R&B

## 2019-07-03 PROCEDURE — 71045 X-RAY EXAM CHEST 1 VIEW: CPT

## 2019-07-03 PROCEDURE — 36415 COLL VENOUS BLD VENIPUNCTURE: CPT

## 2019-07-03 PROCEDURE — 2700000000 HC OXYGEN THERAPY PER DAY

## 2019-07-03 PROCEDURE — 6370000000 HC RX 637 (ALT 250 FOR IP): Performed by: INTERNAL MEDICINE

## 2019-07-03 PROCEDURE — 80048 BASIC METABOLIC PNL TOTAL CA: CPT

## 2019-07-03 PROCEDURE — 94640 AIRWAY INHALATION TREATMENT: CPT

## 2019-07-03 PROCEDURE — 85027 COMPLETE CBC AUTOMATED: CPT

## 2019-07-03 PROCEDURE — 6370000000 HC RX 637 (ALT 250 FOR IP): Performed by: NURSE PRACTITIONER

## 2019-07-03 PROCEDURE — 94761 N-INVAS EAR/PLS OXIMETRY MLT: CPT

## 2019-07-03 RX ORDER — METHYLPREDNISOLONE SODIUM SUCCINATE 40 MG/ML
40 INJECTION, POWDER, LYOPHILIZED, FOR SOLUTION INTRAMUSCULAR; INTRAVENOUS EVERY 12 HOURS
Status: DISCONTINUED | OUTPATIENT
Start: 2019-07-03 | End: 2019-07-05

## 2019-07-03 RX ADMIN — CETIRIZINE HYDROCHLORIDE 5 MG: 10 TABLET, FILM COATED ORAL at 09:36

## 2019-07-03 RX ADMIN — IPRATROPIUM BROMIDE AND ALBUTEROL SULFATE 1 AMPULE: .5; 3 SOLUTION RESPIRATORY (INHALATION) at 03:09

## 2019-07-03 RX ADMIN — ATORVASTATIN CALCIUM 20 MG: 20 TABLET, FILM COATED ORAL at 09:36

## 2019-07-03 RX ADMIN — GUAIFENESIN 600 MG: 600 TABLET, EXTENDED RELEASE ORAL at 22:31

## 2019-07-03 RX ADMIN — METOPROLOL TARTRATE 50 MG: 50 TABLET ORAL at 22:31

## 2019-07-03 RX ADMIN — Medication 10 ML: at 22:42

## 2019-07-03 RX ADMIN — INSULIN GLARGINE 5 UNITS: 100 INJECTION, SOLUTION SUBCUTANEOUS at 22:41

## 2019-07-03 RX ADMIN — GABAPENTIN 300 MG: 300 CAPSULE ORAL at 09:36

## 2019-07-03 RX ADMIN — INSULIN LISPRO 2 UNITS: 100 INJECTION, SOLUTION INTRAVENOUS; SUBCUTANEOUS at 12:50

## 2019-07-03 RX ADMIN — IPRATROPIUM BROMIDE AND ALBUTEROL SULFATE 1 AMPULE: .5; 3 SOLUTION RESPIRATORY (INHALATION) at 16:21

## 2019-07-03 RX ADMIN — SODIUM CHLORIDE: 9 INJECTION, SOLUTION INTRAVENOUS at 12:57

## 2019-07-03 RX ADMIN — INSULIN LISPRO 2 UNITS: 100 INJECTION, SOLUTION INTRAVENOUS; SUBCUTANEOUS at 22:40

## 2019-07-03 RX ADMIN — GUAIFENESIN 600 MG: 600 TABLET, EXTENDED RELEASE ORAL at 09:36

## 2019-07-03 RX ADMIN — HYDROCODONE BITARTRATE AND ACETAMINOPHEN 1 TABLET: 5; 325 TABLET ORAL at 09:36

## 2019-07-03 RX ADMIN — IPRATROPIUM BROMIDE AND ALBUTEROL SULFATE 1 AMPULE: .5; 3 SOLUTION RESPIRATORY (INHALATION) at 07:41

## 2019-07-03 RX ADMIN — FERROUS SULFATE TAB 325 MG (65 MG ELEMENTAL FE) 325 MG: 325 (65 FE) TAB at 09:36

## 2019-07-03 RX ADMIN — HEPARIN SODIUM 5000 UNITS: 5000 INJECTION INTRAVENOUS; SUBCUTANEOUS at 22:32

## 2019-07-03 RX ADMIN — INSULIN LISPRO 4 UNITS: 100 INJECTION, SOLUTION INTRAVENOUS; SUBCUTANEOUS at 09:38

## 2019-07-03 RX ADMIN — LEVETIRACETAM 500 MG: 500 TABLET, FILM COATED ORAL at 22:30

## 2019-07-03 RX ADMIN — IPRATROPIUM BROMIDE AND ALBUTEROL SULFATE 1 AMPULE: .5; 3 SOLUTION RESPIRATORY (INHALATION) at 19:58

## 2019-07-03 RX ADMIN — FERROUS SULFATE TAB 325 MG (65 MG ELEMENTAL FE) 325 MG: 325 (65 FE) TAB at 22:31

## 2019-07-03 RX ADMIN — GABAPENTIN 300 MG: 300 CAPSULE ORAL at 22:32

## 2019-07-03 RX ADMIN — FLUTICASONE PROPIONATE 2 SPRAY: 50 SPRAY, METERED NASAL at 09:45

## 2019-07-03 RX ADMIN — CITALOPRAM HYDROBROMIDE 20 MG: 20 TABLET ORAL at 09:36

## 2019-07-03 RX ADMIN — INSULIN LISPRO 6 UNITS: 100 INJECTION, SOLUTION INTRAVENOUS; SUBCUTANEOUS at 17:34

## 2019-07-03 RX ADMIN — GABAPENTIN 300 MG: 300 CAPSULE ORAL at 14:02

## 2019-07-03 RX ADMIN — Medication 1 MG: at 22:30

## 2019-07-03 RX ADMIN — Medication 10 ML: at 09:44

## 2019-07-03 RX ADMIN — METHYLPREDNISOLONE SODIUM SUCCINATE 40 MG: 40 INJECTION, POWDER, FOR SOLUTION INTRAMUSCULAR; INTRAVENOUS at 17:35

## 2019-07-03 RX ADMIN — LEVOTHYROXINE SODIUM 100 MCG: 100 TABLET ORAL at 05:09

## 2019-07-03 RX ADMIN — INSULIN GLARGINE 20 UNITS: 100 INJECTION, SOLUTION SUBCUTANEOUS at 09:38

## 2019-07-03 RX ADMIN — HYDROCODONE BITARTRATE AND ACETAMINOPHEN 1 TABLET: 5; 325 TABLET ORAL at 22:43

## 2019-07-03 RX ADMIN — METHYLPREDNISOLONE SODIUM SUCCINATE 40 MG: 40 INJECTION, POWDER, LYOPHILIZED, FOR SOLUTION INTRAMUSCULAR; INTRAVENOUS at 05:08

## 2019-07-03 RX ADMIN — IPRATROPIUM BROMIDE AND ALBUTEROL SULFATE 1 AMPULE: .5; 3 SOLUTION RESPIRATORY (INHALATION) at 11:17

## 2019-07-03 RX ADMIN — AZITHROMYCIN MONOHYDRATE 500 MG: 500 INJECTION, POWDER, LYOPHILIZED, FOR SOLUTION INTRAVENOUS at 12:57

## 2019-07-03 RX ADMIN — PANTOPRAZOLE SODIUM 40 MG: 40 TABLET, DELAYED RELEASE ORAL at 05:08

## 2019-07-03 RX ADMIN — LEVETIRACETAM 500 MG: 500 TABLET, FILM COATED ORAL at 09:36

## 2019-07-03 RX ADMIN — IPRATROPIUM BROMIDE AND ALBUTEROL SULFATE 1 AMPULE: .5; 3 SOLUTION RESPIRATORY (INHALATION) at 23:12

## 2019-07-03 RX ADMIN — HEPARIN SODIUM 5000 UNITS: 5000 INJECTION INTRAVENOUS; SUBCUTANEOUS at 14:02

## 2019-07-03 ASSESSMENT — PAIN SCALES - GENERAL
PAINLEVEL_OUTOF10: 0
PAINLEVEL_OUTOF10: 1
PAINLEVEL_OUTOF10: 2

## 2019-07-03 NOTE — PROGRESS NOTES
BID    fluticasone  2 spray Nasal Daily    pantoprazole  40 mg Oral QAM AC    metoprolol tartrate  50 mg Oral BID    levothyroxine  100 mcg Oral Daily    levETIRAcetam  500 mg Oral BID    HYDROcodone-acetaminophen  1 tablet Oral BID    guaiFENesin  600 mg Oral BID    gabapentin  300 mg Oral TID    insulin lispro  0-12 Units Subcutaneous TID WC    insulin lispro  0-6 Units Subcutaneous Nightly    insulin glargine  20 Units Subcutaneous Daily    insulin glargine  5 Units Subcutaneous Nightly     melatonin ER, sodium chloride flush, acetaminophen, benzonatate, docusate sodium, loperamide, ipratropium-albuterol, glucose, dextrose, glucagon (rDNA), dextrose, ondansetron, nicotine  IV Drips/Infusions   dextrose      sodium chloride 50 mL/hr at 07/02/19 2039       Diet/Nutrition   DIET CARDIAC; Carb Control: 4 carb choices (60 gms)/meal  Dietary Nutrition Supplements: Wound Healing Oral Supplement    Exam      Constitutional - Alert, arousable  General Appearance  well developed, well nourished morbidly obese  HEENT -normocephalic, atraumatic. PERRLA  Lungs - Chest expands equally, no wheezes, rales or rhonchi. Diminished breath sounds  Cardiovascular - Heart sounds are normal.  normal rate and rhythm regular, no murmur, gallop or rub. Abdomen - soft, nontender, nondistended, no masses or organomegaly  Neurologic - CN II-XII are grossly intact.  There are no focal motor deficits  Skin - no bruising or bleeding  Extremities - no cyanosis, clubbing or edema    Lab Results   CBC     Lab Results   Component Value Date    WBC 9.8 07/03/2019    RBC 3.40 07/03/2019    HGB 10.1 07/03/2019    HCT 30.5 07/03/2019     07/03/2019    MCV 89.8 07/03/2019    MCH 29.6 07/03/2019    MCHC 33.0 07/03/2019    RDW 14.2 07/03/2019    METASPCT 1 01/02/2015    LYMPHOPCT 9 06/27/2019    MONOPCT 2 06/27/2019    MYELOPCT 1 11/22/2012    BASOPCT 1 06/27/2019    MONOSABS 0.41 06/27/2019    LYMPHSABS 1.83 06/27/2019    EOSABS 0.00

## 2019-07-03 NOTE — PROGRESS NOTES
lymphadenopathy, masses or thyromegaly. EXTREMITIES: No edema. Peripheral pulses intact. NEURO: No focal neurologic deficits. Labs:   CBC:  Recent Labs     07/01/19  0518 07/02/19  0505 07/03/19  0504   WBC 11.2* 11.4* 9.8   RBC 3.43* 3.46* 3.40*   HGB 10.1* 10.3* 10.1*   HCT 31.7* 31.9* 30.5*   MCV 92.2 92.2 89.8   MCH 29.4 29.7 29.6   MCHC 31.8 32.2 33.0   RDW 14.1 14.1 14.2    312 334   MPV 8.1 8.0 8.5      BMP:   Recent Labs     07/01/19  0518 07/02/19  0505 07/02/19  1720 07/03/19  0504    140  --  140   K 4.3 5.4* 4.5 4.3    104  --  104   CO2 23 23  --  25   BUN 42* 44*  --  51*   CREATININE 1.62* 1.50*  --  1.66*   GLUCOSE 148* 168*  --  204*   CALCIUM 8.7 9.0  --  8.7        Assessment/plan:    1. Acute kidney injury superimposed on chronic kidney disease stage 3 - patient is nonoliguric and renal function is improved. Plan: Continue IV fluid 0.9 normal saline at 50 mL/h. Avoid nephrotoxic agents. Strict input and output documentation. Strict input and output documentation. Continue to hold losartan. Basic metabolic profile daily. 2.  Hyperkalemia - corrected. 3.  Systemic hypertension - continue current medications.       Electronically signed by Sony Emmanuel MD on 7/3/2019 at 7:05 AM

## 2019-07-03 NOTE — PROGRESS NOTES
Nutrition Assessment    Type and Reason for Visit: Reassess    Nutrition Recommendations: Continue current diet and continue oral nutrition supplement. Nutrition Assessment: Pt improving from a nutritional standpoint as evidenced by adequate PO and oral nutrition supplement intakes. Pt remains at risk for further compromise due to increased needs for wound healing. Will continue current diet and supplement and monitor PO intakes. Malnutrition Assessment:  · Malnutrition Status: No malnutrition  · Context: Acute illness or injury  · Findings of the 6 clinical characteristics of malnutrition (Minimum of 2 out of 6 clinical characteristics is required to make the diagnosis of moderate or severe Protein Calorie Malnutrition based on AND/ASPEN Guidelines):  1. Energy Intake-Greater than 75% of estimated energy requirement, Greater than or equal to 7 days    2. Weight Loss-No significant weight loss,    3. Fat Loss-No significant subcutaneous fat loss,    4. Muscle Loss-No significant muscle mass loss,    5. Fluid Accumulation-No significant fluid accumulation, Extremities    Nutrition Risk Level:  Moderate    Nutrient Needs:  · Estimated Daily Total Kcal: 9406-5819 kcals based on 11-13 kcals per kg of current wt (7-2)  · Estimated Daily Protein (g): 85-95 gm of protein based on 1.8-2 gm/kg of ideal weight     Nutrition Diagnosis:   · Problem: Increased nutrient needs  · Etiology: related to Increased demand for energy/nutrients     Signs and symptoms:  as evidenced by Presence of wounds(stage II presure ulcer on right buttocks)    Objective Information:  · Nutrition-Focused Physical Findings: Edema: RLE LLE, trace  · Wound Type: Pressure Ulcer, Stage II(right buttocks)  · Current Nutrition Therapies:  · Oral Diet Orders: Cardiac, Carb Control 4 Carbs/Meal   · Oral Diet intake: %  · Oral Nutrition Supplement (ONS) Orders: Wound Healing Oral Supplement  · ONS intake: %  · Anthropometric

## 2019-07-04 PROBLEM — E66.2 OBESITY HYPOVENTILATION SYNDROME (HCC): Chronic | Status: ACTIVE | Noted: 2019-07-04

## 2019-07-04 PROBLEM — J15.7 PNEUMONIA OF BOTH LUNGS DUE TO MYCOPLASMA PNEUMONIAE: Status: ACTIVE | Noted: 2019-07-04

## 2019-07-04 PROBLEM — G47.33 OSA (OBSTRUCTIVE SLEEP APNEA): Chronic | Status: ACTIVE | Noted: 2019-07-04

## 2019-07-04 LAB
ADENOVIRUS PCR: NOT DETECTED
ANION GAP SERPL CALCULATED.3IONS-SCNC: 11 MMOL/L (ref 9–17)
BORDETELLA PERTUSSIS PCR: NOT DETECTED
BUN BLDV-MCNC: 50 MG/DL (ref 8–23)
BUN/CREAT BLD: ABNORMAL (ref 9–20)
CALCIUM SERPL-MCNC: 8.6 MG/DL (ref 8.6–10.4)
CHLAMYDIA PNEUMONIAE BY PCR: NOT DETECTED
CHLORIDE BLD-SCNC: 106 MMOL/L (ref 98–107)
CO2: 24 MMOL/L (ref 20–31)
CORONAVIRUS 229E PCR: NOT DETECTED
CORONAVIRUS HKU1 PCR: NOT DETECTED
CORONAVIRUS NL63 PCR: NOT DETECTED
CORONAVIRUS OC43 PCR: NOT DETECTED
CREAT SERPL-MCNC: 1.53 MG/DL (ref 0.5–0.9)
CREATININE URINE: 50.4 MG/DL (ref 28–217)
CULTURE: NORMAL
CULTURE: NORMAL
GFR AFRICAN AMERICAN: 40 ML/MIN
GFR NON-AFRICAN AMERICAN: 33 ML/MIN
GFR SERPL CREATININE-BSD FRML MDRD: ABNORMAL ML/MIN/{1.73_M2}
GFR SERPL CREATININE-BSD FRML MDRD: ABNORMAL ML/MIN/{1.73_M2}
GLUCOSE BLD-MCNC: 178 MG/DL (ref 65–105)
GLUCOSE BLD-MCNC: 197 MG/DL (ref 65–105)
GLUCOSE BLD-MCNC: 215 MG/DL (ref 70–99)
GLUCOSE BLD-MCNC: 230 MG/DL (ref 65–105)
GLUCOSE BLD-MCNC: 315 MG/DL (ref 65–105)
HCT VFR BLD CALC: 29.8 % (ref 36–46)
HEMOGLOBIN: 9.6 G/DL (ref 12–16)
HUMAN METAPNEUMOVIRUS PCR: NOT DETECTED
INFLUENZA A BY PCR: NOT DETECTED
INFLUENZA A H1 (2009) PCR: NORMAL
INFLUENZA A H1 PCR: NORMAL
INFLUENZA A H3 PCR: NORMAL
INFLUENZA B BY PCR: NOT DETECTED
Lab: NORMAL
Lab: NORMAL
MCH RBC QN AUTO: 29.5 PG (ref 26–34)
MCHC RBC AUTO-ENTMCNC: 32.2 G/DL (ref 31–37)
MCV RBC AUTO: 91.7 FL (ref 80–100)
MYCOPLASMA PNEUMONIAE PCR: NOT DETECTED
NRBC AUTOMATED: ABNORMAL PER 100 WBC
PARAINFLUENZA 1 PCR: NOT DETECTED
PARAINFLUENZA 2 PCR: NOT DETECTED
PARAINFLUENZA 3 PCR: NOT DETECTED
PARAINFLUENZA 4 PCR: NOT DETECTED
PDW BLD-RTO: 14.6 % (ref 11.5–14.9)
PLATELET # BLD: 335 K/UL (ref 150–450)
PMV BLD AUTO: 7.9 FL (ref 6–12)
POTASSIUM SERPL-SCNC: 5.1 MMOL/L (ref 3.7–5.3)
RBC # BLD: 3.25 M/UL (ref 4–5.2)
RESP SYNCYTIAL VIRUS PCR: NOT DETECTED
RHINO/ENTEROVIRUS PCR: NOT DETECTED
SODIUM BLD-SCNC: 141 MMOL/L (ref 135–144)
SPECIMEN DESCRIPTION: NORMAL
TOTAL PROTEIN, URINE: 9 MG/DL
URINE TOTAL PROTEIN CREATININE RATIO: 0.18 (ref 0–0.2)
WBC # BLD: 11.4 K/UL (ref 3.5–11)

## 2019-07-04 PROCEDURE — 6360000002 HC RX W HCPCS: Performed by: INTERNAL MEDICINE

## 2019-07-04 PROCEDURE — 82570 ASSAY OF URINE CREATININE: CPT

## 2019-07-04 PROCEDURE — 36415 COLL VENOUS BLD VENIPUNCTURE: CPT

## 2019-07-04 PROCEDURE — 6370000000 HC RX 637 (ALT 250 FOR IP): Performed by: NURSE PRACTITIONER

## 2019-07-04 PROCEDURE — 2580000003 HC RX 258: Performed by: INTERNAL MEDICINE

## 2019-07-04 PROCEDURE — 82947 ASSAY GLUCOSE BLOOD QUANT: CPT

## 2019-07-04 PROCEDURE — 2700000000 HC OXYGEN THERAPY PER DAY

## 2019-07-04 PROCEDURE — 6370000000 HC RX 637 (ALT 250 FOR IP): Performed by: INTERNAL MEDICINE

## 2019-07-04 PROCEDURE — 84156 ASSAY OF PROTEIN URINE: CPT

## 2019-07-04 PROCEDURE — 94640 AIRWAY INHALATION TREATMENT: CPT

## 2019-07-04 PROCEDURE — 85027 COMPLETE CBC AUTOMATED: CPT

## 2019-07-04 PROCEDURE — 87486 CHLMYD PNEUM DNA AMP PROBE: CPT

## 2019-07-04 PROCEDURE — 94660 CPAP INITIATION&MGMT: CPT

## 2019-07-04 PROCEDURE — 99233 SBSQ HOSP IP/OBS HIGH 50: CPT | Performed by: INTERNAL MEDICINE

## 2019-07-04 PROCEDURE — 87633 RESP VIRUS 12-25 TARGETS: CPT

## 2019-07-04 PROCEDURE — 80048 BASIC METABOLIC PNL TOTAL CA: CPT

## 2019-07-04 PROCEDURE — 87581 M.PNEUMON DNA AMP PROBE: CPT

## 2019-07-04 PROCEDURE — 2060000000 HC ICU INTERMEDIATE R&B

## 2019-07-04 PROCEDURE — 87798 DETECT AGENT NOS DNA AMP: CPT

## 2019-07-04 PROCEDURE — 94761 N-INVAS EAR/PLS OXIMETRY MLT: CPT

## 2019-07-04 RX ORDER — ALBUTEROL SULFATE 2.5 MG/3ML
2.5 SOLUTION RESPIRATORY (INHALATION) 4 TIMES DAILY
Status: DISCONTINUED | OUTPATIENT
Start: 2019-07-04 | End: 2019-07-06 | Stop reason: HOSPADM

## 2019-07-04 RX ADMIN — INSULIN GLARGINE 20 UNITS: 100 INJECTION, SOLUTION SUBCUTANEOUS at 09:43

## 2019-07-04 RX ADMIN — IPRATROPIUM BROMIDE AND ALBUTEROL SULFATE 1 AMPULE: .5; 3 SOLUTION RESPIRATORY (INHALATION) at 07:14

## 2019-07-04 RX ADMIN — METHYLPREDNISOLONE SODIUM SUCCINATE 40 MG: 40 INJECTION, POWDER, FOR SOLUTION INTRAMUSCULAR; INTRAVENOUS at 06:34

## 2019-07-04 RX ADMIN — ATORVASTATIN CALCIUM 20 MG: 20 TABLET, FILM COATED ORAL at 09:44

## 2019-07-04 RX ADMIN — PANTOPRAZOLE SODIUM 40 MG: 40 TABLET, DELAYED RELEASE ORAL at 06:34

## 2019-07-04 RX ADMIN — LEVETIRACETAM 500 MG: 500 TABLET, FILM COATED ORAL at 23:24

## 2019-07-04 RX ADMIN — FLUTICASONE PROPIONATE 2 SPRAY: 50 SPRAY, METERED NASAL at 09:46

## 2019-07-04 RX ADMIN — FERROUS SULFATE TAB 325 MG (65 MG ELEMENTAL FE) 325 MG: 325 (65 FE) TAB at 23:24

## 2019-07-04 RX ADMIN — ALBUTEROL SULFATE 2.5 MG: 2.5 SOLUTION RESPIRATORY (INHALATION) at 19:28

## 2019-07-04 RX ADMIN — IPRATROPIUM BROMIDE AND ALBUTEROL SULFATE 1 AMPULE: .5; 3 SOLUTION RESPIRATORY (INHALATION) at 03:18

## 2019-07-04 RX ADMIN — METHYLPREDNISOLONE SODIUM SUCCINATE 40 MG: 40 INJECTION, POWDER, FOR SOLUTION INTRAMUSCULAR; INTRAVENOUS at 17:14

## 2019-07-04 RX ADMIN — HEPARIN SODIUM 5000 UNITS: 5000 INJECTION INTRAVENOUS; SUBCUTANEOUS at 13:35

## 2019-07-04 RX ADMIN — GUAIFENESIN 600 MG: 600 TABLET, EXTENDED RELEASE ORAL at 23:23

## 2019-07-04 RX ADMIN — AZITHROMYCIN MONOHYDRATE 500 MG: 500 INJECTION, POWDER, LYOPHILIZED, FOR SOLUTION INTRAVENOUS at 13:21

## 2019-07-04 RX ADMIN — SODIUM CHLORIDE: 9 INJECTION, SOLUTION INTRAVENOUS at 21:15

## 2019-07-04 RX ADMIN — INSULIN GLARGINE 5 UNITS: 100 INJECTION, SOLUTION SUBCUTANEOUS at 23:33

## 2019-07-04 RX ADMIN — METOPROLOL TARTRATE 50 MG: 50 TABLET ORAL at 23:23

## 2019-07-04 RX ADMIN — FERROUS SULFATE TAB 325 MG (65 MG ELEMENTAL FE) 325 MG: 325 (65 FE) TAB at 09:44

## 2019-07-04 RX ADMIN — HEPARIN SODIUM 5000 UNITS: 5000 INJECTION INTRAVENOUS; SUBCUTANEOUS at 06:34

## 2019-07-04 RX ADMIN — HYDROCODONE BITARTRATE AND ACETAMINOPHEN 1 TABLET: 5; 325 TABLET ORAL at 09:44

## 2019-07-04 RX ADMIN — IPRATROPIUM BROMIDE AND ALBUTEROL SULFATE 1 AMPULE: .5; 3 SOLUTION RESPIRATORY (INHALATION) at 11:12

## 2019-07-04 RX ADMIN — GABAPENTIN 300 MG: 300 CAPSULE ORAL at 16:12

## 2019-07-04 RX ADMIN — CITALOPRAM HYDROBROMIDE 20 MG: 20 TABLET ORAL at 09:45

## 2019-07-04 RX ADMIN — INSULIN LISPRO 3 UNITS: 100 INJECTION, SOLUTION INTRAVENOUS; SUBCUTANEOUS at 23:33

## 2019-07-04 RX ADMIN — GUAIFENESIN 600 MG: 600 TABLET, EXTENDED RELEASE ORAL at 09:45

## 2019-07-04 RX ADMIN — INSULIN LISPRO 2 UNITS: 100 INJECTION, SOLUTION INTRAVENOUS; SUBCUTANEOUS at 11:49

## 2019-07-04 RX ADMIN — INSULIN LISPRO 2 UNITS: 100 INJECTION, SOLUTION INTRAVENOUS; SUBCUTANEOUS at 09:44

## 2019-07-04 RX ADMIN — ALBUTEROL SULFATE 2.5 MG: 2.5 SOLUTION RESPIRATORY (INHALATION) at 15:16

## 2019-07-04 RX ADMIN — HEPARIN SODIUM 5000 UNITS: 5000 INJECTION INTRAVENOUS; SUBCUTANEOUS at 23:33

## 2019-07-04 RX ADMIN — METOPROLOL TARTRATE 50 MG: 50 TABLET ORAL at 09:45

## 2019-07-04 RX ADMIN — SODIUM CHLORIDE: 9 INJECTION, SOLUTION INTRAVENOUS at 04:16

## 2019-07-04 RX ADMIN — HYDROCODONE BITARTRATE AND ACETAMINOPHEN 1 TABLET: 5; 325 TABLET ORAL at 21:15

## 2019-07-04 RX ADMIN — Medication 1 MG: at 23:24

## 2019-07-04 RX ADMIN — INSULIN LISPRO 4 UNITS: 100 INJECTION, SOLUTION INTRAVENOUS; SUBCUTANEOUS at 16:12

## 2019-07-04 RX ADMIN — GABAPENTIN 300 MG: 300 CAPSULE ORAL at 09:44

## 2019-07-04 RX ADMIN — CETIRIZINE HYDROCHLORIDE 5 MG: 10 TABLET, FILM COATED ORAL at 09:45

## 2019-07-04 RX ADMIN — LEVETIRACETAM 500 MG: 500 TABLET, FILM COATED ORAL at 09:45

## 2019-07-04 RX ADMIN — LEVOTHYROXINE SODIUM 100 MCG: 100 TABLET ORAL at 06:34

## 2019-07-04 RX ADMIN — GABAPENTIN 300 MG: 300 CAPSULE ORAL at 23:23

## 2019-07-04 ASSESSMENT — PAIN SCALES - GENERAL
PAINLEVEL_OUTOF10: 3
PAINLEVEL_OUTOF10: 0
PAINLEVEL_OUTOF10: 1
PAINLEVEL_OUTOF10: 0

## 2019-07-04 ASSESSMENT — PAIN DESCRIPTION - LOCATION: LOCATION: GENERALIZED

## 2019-07-04 NOTE — PROGRESS NOTES
(Last 24 hours) at 7/4/2019 0913  Last data filed at 7/4/2019 0323  Gross per 24 hour   Intake 425 ml   Output 900 ml   Net -475 ml       Labs:    Hematology:  Recent Labs     07/02/19  0505 07/03/19  0504 07/04/19  0550   WBC 11.4* 9.8 11.4*   RBC 3.46* 3.40* 3.25*   HGB 10.3* 10.1* 9.6*   HCT 31.9* 30.5* 29.8*   MCV 92.2 89.8 91.7   MCH 29.7 29.6 29.5   MCHC 32.2 33.0 32.2   RDW 14.1 14.2 14.6    334 335   MPV 8.0 8.5 7.9     Chemistry:  Recent Labs     07/02/19  0505 07/02/19  1720 07/03/19  0504 07/04/19  0550     --  140 141   K 5.4* 4.5 4.3 5.1     --  104 106   CO2 23  --  25 24   GLUCOSE 168*  --  204* 215*   BUN 44*  --  51* 50*   CREATININE 1.50*  --  1.66* 1.53*   ANIONGAP 13  --  11 11   LABGLOM 34*  --  30* 33*   GFRAA 41*  --  37* 40*   CALCIUM 9.0  --  8.7 8.6     Recent Labs     07/03/19  0720 07/03/19  1113 07/03/19  1203 07/03/19  1719 07/03/19  2033 07/04/19  0716   POCGLU 203* 180* 174* 266* 237* 178*         Lab Results   Component Value Date/Time    SPECIAL NOT REPORTED 06/28/2019 01:40 AM     Lab Results   Component Value Date/Time    CULTURE NO SIGNIFICANT GROWTH 06/28/2019 01:40 AM       Lab Results   Component Value Date    POCPH 7.20 01/04/2015    PHART 7.377 07/02/2019    PH 7.47 11/20/2012    POCPCO2 57 01/04/2015    TOY2VTW 46.0 07/02/2019    PCO2 53 11/20/2012    POCPO2 100 01/04/2015    PO2ART 110.0 07/02/2019    POCHCO3 22.4 01/04/2015    RPF1BXM 27.0 07/02/2019    HCO3 38.4 11/20/2012    NBEA NOT REPORTED 07/02/2019    PBEA 1.8 07/02/2019    SKV6ZDL 24 01/04/2015    SDEJ4RPI 96 01/04/2015    R4CDKARM 97.9 07/02/2019    FIO2 NOT REPORTED 07/02/2019       Radiology:        Physical Examination:      Obesity falling asleep while in conversation  General appearance:  alert, cooperative and no distress  Mental Status:  oriented to person, place and time and normal affect  Lungs: Decreased air entry  Heart:  regular rate and rhythm, no murmur  Abdomen:  soft, nontender,

## 2019-07-04 NOTE — PROGRESS NOTES
rate and rhythm   Abdomen - Soft, nontender, nondistended, no masses or organomegaly  Neurologic - There are no focal motor or sensory deficits  Skin - No bruising or bleeding  Extremities - No clubbing, cyanosis, edema    MEDS      methylPREDNISolone  40 mg Intravenous Q12H    azithromycin  500 mg Intravenous Q24H    ipratropium-albuterol  1 ampule Inhalation Q4H    heparin (porcine)  5,000 Units Subcutaneous 3 times per day    sodium chloride flush  10 mL Intravenous 2 times per day    atorvastatin  20 mg Oral Daily    cetirizine  5 mg Oral Daily    citalopram  20 mg Oral Daily    ferrous sulfate  325 mg Oral BID    fluticasone  2 spray Nasal Daily    pantoprazole  40 mg Oral QAM AC    metoprolol tartrate  50 mg Oral BID    levothyroxine  100 mcg Oral Daily    levETIRAcetam  500 mg Oral BID    HYDROcodone-acetaminophen  1 tablet Oral BID    guaiFENesin  600 mg Oral BID    gabapentin  300 mg Oral TID    insulin lispro  0-12 Units Subcutaneous TID WC    insulin lispro  0-6 Units Subcutaneous Nightly    insulin glargine  20 Units Subcutaneous Daily    insulin glargine  5 Units Subcutaneous Nightly      dextrose      sodium chloride 50 mL/hr at 07/04/19 0416     melatonin ER, sodium chloride flush, acetaminophen, benzonatate, docusate sodium, loperamide, ipratropium-albuterol, glucose, dextrose, glucagon (rDNA), dextrose, ondansetron, nicotine    LABS   CBC   Recent Labs     07/04/19  0550   WBC 11.4*   HGB 9.6*   HCT 29.8*   MCV 91.7        BMP:   Lab Results   Component Value Date     07/04/2019    K 5.1 07/04/2019     07/04/2019    CO2 24 07/04/2019    BUN 50 07/04/2019    LABALBU 3.1 06/27/2019    CREATININE 1.53 07/04/2019    CALCIUM 8.6 07/04/2019    GFRAA 40 07/04/2019    LABGLOM 33 07/04/2019     ABGs:  Lab Results   Component Value Date    PHART 7.377 07/02/2019    PO2ART 110.0 07/02/2019    PJV5HHK 46.0 07/02/2019      Lab Results   Component Value Date    MODE I/E 07/02/2019     Ionized Calcium:  No results found for: IONCA  Magnesium:    Lab Results   Component Value Date    MG 1.5 01/08/2015     Phosphorus:    Lab Results   Component Value Date    PHOS 2.7 01/08/2015        LIVER PROFILE No results for input(s): AST, ALT, LIPASE, BILIDIR, BILITOT, ALKPHOS in the last 72 hours. Invalid input(s): AMYLASE,  ALB  INR No results for input(s): INR in the last 72 hours. PTT   Lab Results   Component Value Date    APTT 39.5 (H) 06/27/2019         RADIOLOGY     (See actual reports for details)    ASSESSMENT/PLAN   Principal Problem:    Pneumonia of both lungs due to Mycoplasma pneumoniae  Active Problems:    Diabetes ([address]    LEE (acute kidney injury) ([address]    CKD (chronic kidney disease)    Pneumonia    CAD (coronary artery disease)    UTI (urinary tract infection)    Elevated troponin    ANDREINA (obstructive sleep apnea)    Obesity hypoventilation syndrome ([address]  Resolved Problems:    * No resolved hospital problems.  *    Has significantly improved last 48 hours  Has ANDREINA/OHS and will need BiPAP at [facility]  Continue Zithromax for mycoplasma pneumonia  Doubt COPD given lack of significant smoking history, her elevated CO2 was due to her hypoventilation syndrome      Electronically signed by Farzad Loera MD on 7/4/2019 at 12:39 PM

## 2019-07-04 NOTE — PROGRESS NOTES
NEPHROLOGY PROGRESS NOTE     Interval history: Patient was seen and examined today. She is feeling better although she remains on BiPAP. She is nonoliguric. History of Present Illness: This is a 74 y.o. female past medical history of coronary artery disease, COPD, type 2 diabetes, cancer of the tonsil, essential hypertension, hypothyroidism, CKD stage 3 [baseline creatinine1.6 to 1.8 mg/dL], who presented to the hospital with complaints of nonproductive cough and progressive shortness of breath of one-week duration. She was treated in the outpatient for suspected pneumonia without response. Chest x-ray and CT scan showed multifocal consolidative airspace disease with associated atelectasis most consistent with multifocal pneumonia. Oratory studies at presentation were remarkable for a peak serum creatinine of 2.0 mg/dL. She was admitted and started on IV antibiotics for treatment of pneumonia. Objective:    CURRENT TEMPERATURE:  Temp: 97.3 °F (36.3 °C)  MAXIMUM TEMPERATURE OVER 24HRS:  Temp (24hrs), Av.7 °F (36.5 °C), Min:97.3 °F (36.3 °C), Max:98.6 °F (37 °C)    CURRENT RESPIRATORY RATE:  Resp: 18  CURRENT PULSE:  Pulse: 53  CURRENT BLOOD PRESSURE:  BP: 103/67  24HR BLOOD PRESSURE RANGE:  Systolic (64GSA), XMD:901 , Min:103 , IQM:982   ; Diastolic (13QYB), NEC:88, Min:40, Max:67    24HR INTAKE/OUTPUT:      Intake/Output Summary (Last 24 hours) at 2019 1210  Last data filed at 2019 0323  Gross per 24 hour   Intake 425 ml   Output 900 ml   Net -475 ml     Patient Vitals for the past 96 hrs (Last 3 readings):   Weight   19 0415 282 lb 3 oz (128 kg)       Physical Exam:  GENERAL APPEARANCE: Alert and cooperative, and appears to be in no acute distress. CARDIAC: Normal S1 and S2. No S3, S4 or murmurs. Rhythm is regular. LUNGS:  diminished breath sounds. fine rales  NECK: Neck supple, non-tender without lymphadenopathy, masses or thyromegaly. EXTREMITIES: No edema.  Peripheral pulses intact. NEURO: No focal neurologic deficits. Labs:   CBC:  Recent Labs     07/02/19  0505 07/03/19  0504 07/04/19  0550   WBC 11.4* 9.8 11.4*   RBC 3.46* 3.40* 3.25*   HGB 10.3* 10.1* 9.6*   HCT 31.9* 30.5* 29.8*   MCV 92.2 89.8 91.7   MCH 29.7 29.6 29.5   MCHC 32.2 33.0 32.2   RDW 14.1 14.2 14.6    334 335   MPV 8.0 8.5 7.9      BMP:   Recent Labs     07/02/19  0505 07/02/19  1720 07/03/19  0504 07/04/19  0550     --  140 141   K 5.4* 4.5 4.3 5.1     --  104 106   CO2 23  --  25 24   BUN 44*  --  51* 50*   CREATININE 1.50*  --  1.66* 1.53*   GLUCOSE 168*  --  204* 215*   CALCIUM 9.0  --  8.7 8.6        Assessment/plan:    1. Acute kidney injury superimposed on chronic kidney disease stage 3 - patient is nonoliguric and renal function is improved and back to baseline. Plan: Decrease IV fluid to Leeta Gayer. Avoid nephrotoxic agents. Strict input and output documentation. Strict input and output documentation. Continue to hold losartan. Basic metabolic profile daily. 2.  Hyperkalemia - corrected. 3.  Systemic hypertension - Controlled. 4.  Right middle lobe pneumonia - secondary to mycoplasma. Clinically improving.       Electronically signed by Leo Woodson MD on 7/4/2019 at 12:10 PM

## 2019-07-05 LAB
ANION GAP SERPL CALCULATED.3IONS-SCNC: 10 MMOL/L (ref 9–17)
BUN BLDV-MCNC: 42 MG/DL (ref 8–23)
BUN/CREAT BLD: ABNORMAL (ref 9–20)
CALCIUM SERPL-MCNC: 8.4 MG/DL (ref 8.6–10.4)
CHLORIDE BLD-SCNC: 105 MMOL/L (ref 98–107)
CO2: 24 MMOL/L (ref 20–31)
CREAT SERPL-MCNC: 1.32 MG/DL (ref 0.5–0.9)
GFR AFRICAN AMERICAN: 48 ML/MIN
GFR NON-AFRICAN AMERICAN: 39 ML/MIN
GFR SERPL CREATININE-BSD FRML MDRD: ABNORMAL ML/MIN/{1.73_M2}
GFR SERPL CREATININE-BSD FRML MDRD: ABNORMAL ML/MIN/{1.73_M2}
GLUCOSE BLD-MCNC: 174 MG/DL (ref 65–105)
GLUCOSE BLD-MCNC: 188 MG/DL (ref 65–105)
GLUCOSE BLD-MCNC: 207 MG/DL (ref 65–105)
GLUCOSE BLD-MCNC: 222 MG/DL (ref 65–105)
GLUCOSE BLD-MCNC: 265 MG/DL (ref 70–99)
HCT VFR BLD CALC: 30.7 % (ref 36–46)
HEMOGLOBIN: 10 G/DL (ref 12–16)
MCH RBC QN AUTO: 29.5 PG (ref 26–34)
MCHC RBC AUTO-ENTMCNC: 32.5 G/DL (ref 31–37)
MCV RBC AUTO: 90.8 FL (ref 80–100)
NRBC AUTOMATED: ABNORMAL PER 100 WBC
PDW BLD-RTO: 14.7 % (ref 11.5–14.9)
PLATELET # BLD: 347 K/UL (ref 150–450)
PMV BLD AUTO: 8.3 FL (ref 6–12)
POTASSIUM SERPL-SCNC: 4.8 MMOL/L (ref 3.7–5.3)
RBC # BLD: 3.38 M/UL (ref 4–5.2)
SODIUM BLD-SCNC: 139 MMOL/L (ref 135–144)
WBC # BLD: 11.7 K/UL (ref 3.5–11)

## 2019-07-05 PROCEDURE — 2700000000 HC OXYGEN THERAPY PER DAY

## 2019-07-05 PROCEDURE — 36415 COLL VENOUS BLD VENIPUNCTURE: CPT

## 2019-07-05 PROCEDURE — 6370000000 HC RX 637 (ALT 250 FOR IP): Performed by: INTERNAL MEDICINE

## 2019-07-05 PROCEDURE — 2060000000 HC ICU INTERMEDIATE R&B

## 2019-07-05 PROCEDURE — 6360000002 HC RX W HCPCS: Performed by: INTERNAL MEDICINE

## 2019-07-05 PROCEDURE — 99232 SBSQ HOSP IP/OBS MODERATE 35: CPT | Performed by: INTERNAL MEDICINE

## 2019-07-05 PROCEDURE — 94761 N-INVAS EAR/PLS OXIMETRY MLT: CPT

## 2019-07-05 PROCEDURE — 80048 BASIC METABOLIC PNL TOTAL CA: CPT

## 2019-07-05 PROCEDURE — 94640 AIRWAY INHALATION TREATMENT: CPT

## 2019-07-05 PROCEDURE — 82947 ASSAY GLUCOSE BLOOD QUANT: CPT

## 2019-07-05 PROCEDURE — 94660 CPAP INITIATION&MGMT: CPT

## 2019-07-05 PROCEDURE — 6370000000 HC RX 637 (ALT 250 FOR IP): Performed by: NURSE PRACTITIONER

## 2019-07-05 PROCEDURE — 97166 OT EVAL MOD COMPLEX 45 MIN: CPT

## 2019-07-05 PROCEDURE — 85027 COMPLETE CBC AUTOMATED: CPT

## 2019-07-05 PROCEDURE — 2580000003 HC RX 258: Performed by: INTERNAL MEDICINE

## 2019-07-05 RX ORDER — BENZONATATE 100 MG/1
200 CAPSULE ORAL EVERY 8 HOURS PRN
Status: DISCONTINUED | OUTPATIENT
Start: 2019-07-05 | End: 2019-07-06 | Stop reason: HOSPADM

## 2019-07-05 RX ORDER — BENZONATATE 200 MG/1
200 CAPSULE ORAL EVERY 8 HOURS PRN
Qty: 21 CAPSULE | Refills: 3
Start: 2019-07-05 | End: 2019-07-12

## 2019-07-05 RX ORDER — ALBUTEROL SULFATE 2.5 MG/3ML
2.5 SOLUTION RESPIRATORY (INHALATION) 4 TIMES DAILY
Qty: 120 EACH | Refills: 3
Start: 2019-07-05 | End: 2020-01-22

## 2019-07-05 RX ORDER — AZITHROMYCIN 500 MG/1
500 TABLET, FILM COATED ORAL DAILY
Qty: 1 PACKET | Refills: 0
Start: 2019-07-05 | End: 2019-07-10

## 2019-07-05 RX ORDER — GUAIFENESIN 600 MG/1
600 TABLET, EXTENDED RELEASE ORAL 2 TIMES DAILY
Qty: 14 TABLET | Refills: 0
Start: 2019-07-05 | End: 2019-07-12

## 2019-07-05 RX ADMIN — GUAIFENESIN 600 MG: 600 TABLET, EXTENDED RELEASE ORAL at 09:52

## 2019-07-05 RX ADMIN — METOPROLOL TARTRATE 50 MG: 50 TABLET ORAL at 09:52

## 2019-07-05 RX ADMIN — AZITHROMYCIN MONOHYDRATE 500 MG: 500 INJECTION, POWDER, LYOPHILIZED, FOR SOLUTION INTRAVENOUS at 12:19

## 2019-07-05 RX ADMIN — HYDROCODONE BITARTRATE AND ACETAMINOPHEN 1 TABLET: 5; 325 TABLET ORAL at 09:52

## 2019-07-05 RX ADMIN — ALBUTEROL SULFATE 2.5 MG: 2.5 SOLUTION RESPIRATORY (INHALATION) at 11:11

## 2019-07-05 RX ADMIN — CETIRIZINE HYDROCHLORIDE 5 MG: 10 TABLET, FILM COATED ORAL at 09:53

## 2019-07-05 RX ADMIN — CITALOPRAM HYDROBROMIDE 20 MG: 20 TABLET ORAL at 09:53

## 2019-07-05 RX ADMIN — ALBUTEROL SULFATE 2.5 MG: 2.5 SOLUTION RESPIRATORY (INHALATION) at 15:01

## 2019-07-05 RX ADMIN — LEVETIRACETAM 500 MG: 500 TABLET, FILM COATED ORAL at 21:56

## 2019-07-05 RX ADMIN — HYDROCODONE BITARTRATE AND ACETAMINOPHEN 1 TABLET: 5; 325 TABLET ORAL at 21:57

## 2019-07-05 RX ADMIN — METHYLPREDNISOLONE SODIUM SUCCINATE 40 MG: 40 INJECTION, POWDER, FOR SOLUTION INTRAMUSCULAR; INTRAVENOUS at 05:07

## 2019-07-05 RX ADMIN — HEPARIN SODIUM 5000 UNITS: 5000 INJECTION INTRAVENOUS; SUBCUTANEOUS at 05:16

## 2019-07-05 RX ADMIN — METOPROLOL TARTRATE 50 MG: 50 TABLET ORAL at 21:57

## 2019-07-05 RX ADMIN — GABAPENTIN 300 MG: 300 CAPSULE ORAL at 09:53

## 2019-07-05 RX ADMIN — FERROUS SULFATE TAB 325 MG (65 MG ELEMENTAL FE) 325 MG: 325 (65 FE) TAB at 21:57

## 2019-07-05 RX ADMIN — Medication 1 MG: at 21:56

## 2019-07-05 RX ADMIN — HEPARIN SODIUM 5000 UNITS: 5000 INJECTION INTRAVENOUS; SUBCUTANEOUS at 14:42

## 2019-07-05 RX ADMIN — GABAPENTIN 300 MG: 300 CAPSULE ORAL at 21:57

## 2019-07-05 RX ADMIN — LEVETIRACETAM 500 MG: 500 TABLET, FILM COATED ORAL at 09:52

## 2019-07-05 RX ADMIN — INSULIN LISPRO 4 UNITS: 100 INJECTION, SOLUTION INTRAVENOUS; SUBCUTANEOUS at 12:13

## 2019-07-05 RX ADMIN — INSULIN GLARGINE 5 UNITS: 100 INJECTION, SOLUTION SUBCUTANEOUS at 22:04

## 2019-07-05 RX ADMIN — ALBUTEROL SULFATE 2.5 MG: 2.5 SOLUTION RESPIRATORY (INHALATION) at 19:45

## 2019-07-05 RX ADMIN — ATORVASTATIN CALCIUM 20 MG: 20 TABLET, FILM COATED ORAL at 09:54

## 2019-07-05 RX ADMIN — HEPARIN SODIUM 5000 UNITS: 5000 INJECTION INTRAVENOUS; SUBCUTANEOUS at 22:05

## 2019-07-05 RX ADMIN — LEVOTHYROXINE SODIUM 100 MCG: 100 TABLET ORAL at 05:07

## 2019-07-05 RX ADMIN — INSULIN LISPRO 2 UNITS: 100 INJECTION, SOLUTION INTRAVENOUS; SUBCUTANEOUS at 18:08

## 2019-07-05 RX ADMIN — INSULIN LISPRO 1 UNITS: 100 INJECTION, SOLUTION INTRAVENOUS; SUBCUTANEOUS at 22:05

## 2019-07-05 RX ADMIN — FLUTICASONE PROPIONATE 2 SPRAY: 50 SPRAY, METERED NASAL at 09:54

## 2019-07-05 RX ADMIN — INSULIN GLARGINE 20 UNITS: 100 INJECTION, SOLUTION SUBCUTANEOUS at 12:13

## 2019-07-05 RX ADMIN — PANTOPRAZOLE SODIUM 40 MG: 40 TABLET, DELAYED RELEASE ORAL at 05:07

## 2019-07-05 RX ADMIN — Medication 10 ML: at 21:58

## 2019-07-05 RX ADMIN — GABAPENTIN 300 MG: 300 CAPSULE ORAL at 14:42

## 2019-07-05 RX ADMIN — GUAIFENESIN 600 MG: 600 TABLET, EXTENDED RELEASE ORAL at 21:57

## 2019-07-05 RX ADMIN — FERROUS SULFATE TAB 325 MG (65 MG ELEMENTAL FE) 325 MG: 325 (65 FE) TAB at 09:53

## 2019-07-05 RX ADMIN — ALBUTEROL SULFATE 2.5 MG: 2.5 SOLUTION RESPIRATORY (INHALATION) at 07:02

## 2019-07-05 ASSESSMENT — PAIN SCALES - GENERAL
PAINLEVEL_OUTOF10: 0
PAINLEVEL_OUTOF10: 2
PAINLEVEL_OUTOF10: 2

## 2019-07-05 ASSESSMENT — PAIN DESCRIPTION - PAIN TYPE: TYPE: CHRONIC PAIN

## 2019-07-05 ASSESSMENT — PAIN DESCRIPTION - LOCATION: LOCATION: GENERALIZED

## 2019-07-05 NOTE — PROGRESS NOTES
Pt on 30% FiO2, SpO2 100%  Pt is sleeping comfortably and doing well. No redness, irritation or skin breakdown.

## 2019-07-05 NOTE — PROGRESS NOTES
Abdomen - Soft, nontender, nondistended, no masses or organomegaly  Neurologic - There are no focal motor or sensory deficits  Skin - No bruising or bleeding  Extremities - No clubbing, cyanosis, edema    MEDS      albuterol  2.5 mg Nebulization 4x daily    methylPREDNISolone  40 mg Intravenous Q12H    azithromycin  500 mg Intravenous Q24H    heparin (porcine)  5,000 Units Subcutaneous 3 times per day    sodium chloride flush  10 mL Intravenous 2 times per day    atorvastatin  20 mg Oral Daily    cetirizine  5 mg Oral Daily    citalopram  20 mg Oral Daily    ferrous sulfate  325 mg Oral BID    fluticasone  2 spray Nasal Daily    pantoprazole  40 mg Oral QAM AC    metoprolol tartrate  50 mg Oral BID    levothyroxine  100 mcg Oral Daily    levETIRAcetam  500 mg Oral BID    HYDROcodone-acetaminophen  1 tablet Oral BID    guaiFENesin  600 mg Oral BID    gabapentin  300 mg Oral TID    insulin lispro  0-12 Units Subcutaneous TID WC    insulin lispro  0-6 Units Subcutaneous Nightly    insulin glargine  20 Units Subcutaneous Daily    insulin glargine  5 Units Subcutaneous Nightly      dextrose      sodium chloride 50 mL/hr at 07/04/19 2115     melatonin ER, sodium chloride flush, acetaminophen, benzonatate, docusate sodium, loperamide, ipratropium-albuterol, glucose, dextrose, glucagon (rDNA), dextrose, ondansetron, nicotine    LABS   CBC   Recent Labs     07/05/19  0535   WBC 11.7*   HGB 10.0*   HCT 30.7*   MCV 90.8        BMP:   Lab Results   Component Value Date     07/05/2019    K 4.8 07/05/2019     07/05/2019    CO2 24 07/05/2019    BUN 42 07/05/2019    LABALBU 3.1 06/27/2019    CREATININE 1.32 07/05/2019    CALCIUM 8.4 07/05/2019    GFRAA 48 07/05/2019    LABGLOM 39 07/05/2019     ABGs:  Lab Results   Component Value Date    PHART 7.377 07/02/2019    PO2ART 110.0 07/02/2019    [id number] 46.0 07/02/2019      Lab Results   Component Value Date    MODE I/E 07/02/2019     Ionized

## 2019-07-05 NOTE — PROGRESS NOTES
Primary Problem  Pneumonia of both lungs due to Mycoplasma pneumoniae    Active Hospital Problems    Diagnosis Date Noted    Pneumonia of both lungs due to Mycoplasma pneumoniae [J15.7] 07/04/2019    ANDREINA (obstructive sleep apnea) [G47.33] 07/04/2019    Obesity hypoventilation syndrome ([facility] [address] [E66.2] 07/04/2019    UTI (urinary tract infection) [N39.0] 06/28/2019    Elevated troponin [R74.8] 06/28/2019    Pneumonia [J18.9] 01/01/2015    LEE (acute kidney injury) ([facility] [address] [N17.9] 01/01/2015    CKD (chronic kidney disease) [N18.9] 01/01/2015    CAD (coronary artery disease) [I25.10]     Diabetes ([email or web address] [E11.9]        Plan:        1. Severe morbid obesity BMI over 50  2. 2 home resident obesity hypoventilation and obstructive sleep apnea  3. Treated with fever cough and confusion  4. Currently treated for pneumonia left lower lobe  5. Metabolic encephalopathy likely secondary to infection also could be due to CO2 narcosis  6. Pulmonary consult continue steroids antibiotics  7. Acute on chronic kidney injury improved to 1.6  8.  History of tonsil cancer  History of diabetes and coronary disease  Repeat a1c  Last one in 2015 6.6    July 4    myco plasma pneumonia  Acute on   chronic respiratory failure  pulm input noted  Iv abx  bipap  July 5  Patient needs a BiPAP for discharge plan  Leukoplakia noted on the right inner side of the tongue advised to follow-up with ENT or dentist to biopsy rule out tongue cancer  Pt agreable          Joao Bey MD  7/5/2019  1:43 PM

## 2019-07-05 NOTE — CARE COORDINATION
ONGOING DISCHARGE PLAN:    Plan remains for LSW to continue to follow for return to [facility], [address] Is a resident/bedhold. LSW is also following for needed BIPAP at facility. Pt. Remains on IV Zithromax. Pulmonary continues to follow. ?DC today. Will continue to follow along w/ LSW, for additional discharge needs.     Electronically signed by Alex Ahumada RN on 7/5/2019 at 12:59 PM

## 2019-07-06 VITALS
OXYGEN SATURATION: 97 % | RESPIRATION RATE: 17 BRPM | DIASTOLIC BLOOD PRESSURE: 60 MMHG | TEMPERATURE: 97.9 F | BODY MASS INDEX: 53.28 KG/M2 | HEART RATE: 58 BPM | HEIGHT: 61 IN | WEIGHT: 282.19 LBS | SYSTOLIC BLOOD PRESSURE: 100 MMHG

## 2019-07-06 LAB
ANION GAP SERPL CALCULATED.3IONS-SCNC: 10 MMOL/L (ref 9–17)
BUN BLDV-MCNC: 38 MG/DL (ref 8–23)
BUN/CREAT BLD: ABNORMAL (ref 9–20)
CALCIUM SERPL-MCNC: 8.5 MG/DL (ref 8.6–10.4)
CHLORIDE BLD-SCNC: 107 MMOL/L (ref 98–107)
CO2: 24 MMOL/L (ref 20–31)
CREAT SERPL-MCNC: 1.32 MG/DL (ref 0.5–0.9)
GFR AFRICAN AMERICAN: 48 ML/MIN
GFR NON-AFRICAN AMERICAN: 39 ML/MIN
GFR SERPL CREATININE-BSD FRML MDRD: ABNORMAL ML/MIN/{1.73_M2}
GFR SERPL CREATININE-BSD FRML MDRD: ABNORMAL ML/MIN/{1.73_M2}
GLUCOSE BLD-MCNC: 111 MG/DL (ref 65–105)
GLUCOSE BLD-MCNC: 143 MG/DL (ref 70–99)
HCT VFR BLD CALC: 33.9 % (ref 36–46)
HEMOGLOBIN: 10.9 G/DL (ref 12–16)
MCH RBC QN AUTO: 29.7 PG (ref 26–34)
MCHC RBC AUTO-ENTMCNC: 32.1 G/DL (ref 31–37)
MCV RBC AUTO: 92.6 FL (ref 80–100)
NRBC AUTOMATED: ABNORMAL PER 100 WBC
PDW BLD-RTO: 14.7 % (ref 11.5–14.9)
PLATELET # BLD: 338 K/UL (ref 150–450)
PMV BLD AUTO: 7.7 FL (ref 6–12)
POTASSIUM SERPL-SCNC: 4.3 MMOL/L (ref 3.7–5.3)
RBC # BLD: 3.66 M/UL (ref 4–5.2)
SODIUM BLD-SCNC: 141 MMOL/L (ref 135–144)
WBC # BLD: 13.7 K/UL (ref 3.5–11)

## 2019-07-06 PROCEDURE — 6360000002 HC RX W HCPCS: Performed by: INTERNAL MEDICINE

## 2019-07-06 PROCEDURE — 94762 N-INVAS EAR/PLS OXIMTRY CONT: CPT

## 2019-07-06 PROCEDURE — 85027 COMPLETE CBC AUTOMATED: CPT

## 2019-07-06 PROCEDURE — 80048 BASIC METABOLIC PNL TOTAL CA: CPT

## 2019-07-06 PROCEDURE — 2580000003 HC RX 258: Performed by: INTERNAL MEDICINE

## 2019-07-06 PROCEDURE — 94660 CPAP INITIATION&MGMT: CPT

## 2019-07-06 PROCEDURE — 6370000000 HC RX 637 (ALT 250 FOR IP): Performed by: NURSE PRACTITIONER

## 2019-07-06 PROCEDURE — 99239 HOSP IP/OBS DSCHRG MGMT >30: CPT | Performed by: INTERNAL MEDICINE

## 2019-07-06 PROCEDURE — 82947 ASSAY GLUCOSE BLOOD QUANT: CPT

## 2019-07-06 PROCEDURE — 94640 AIRWAY INHALATION TREATMENT: CPT

## 2019-07-06 PROCEDURE — 36415 COLL VENOUS BLD VENIPUNCTURE: CPT

## 2019-07-06 RX ORDER — HYDROCODONE BITARTRATE AND ACETAMINOPHEN 5; 325 MG/1; MG/1
1 TABLET ORAL 2 TIMES DAILY
Qty: 6 TABLET | Refills: 0 | Status: SHIPPED | OUTPATIENT
Start: 2019-07-06 | End: 2019-07-09

## 2019-07-06 RX ADMIN — GUAIFENESIN 600 MG: 600 TABLET, EXTENDED RELEASE ORAL at 08:18

## 2019-07-06 RX ADMIN — PANTOPRAZOLE SODIUM 40 MG: 40 TABLET, DELAYED RELEASE ORAL at 05:36

## 2019-07-06 RX ADMIN — LEVETIRACETAM 500 MG: 500 TABLET, FILM COATED ORAL at 08:18

## 2019-07-06 RX ADMIN — GABAPENTIN 300 MG: 300 CAPSULE ORAL at 08:17

## 2019-07-06 RX ADMIN — CITALOPRAM HYDROBROMIDE 20 MG: 20 TABLET ORAL at 08:18

## 2019-07-06 RX ADMIN — HEPARIN SODIUM 5000 UNITS: 5000 INJECTION INTRAVENOUS; SUBCUTANEOUS at 05:36

## 2019-07-06 RX ADMIN — LEVOTHYROXINE SODIUM 100 MCG: 100 TABLET ORAL at 05:36

## 2019-07-06 RX ADMIN — Medication 10 ML: at 08:17

## 2019-07-06 RX ADMIN — ALBUTEROL SULFATE 2.5 MG: 2.5 SOLUTION RESPIRATORY (INHALATION) at 06:56

## 2019-07-06 RX ADMIN — ATORVASTATIN CALCIUM 20 MG: 20 TABLET, FILM COATED ORAL at 08:18

## 2019-07-06 RX ADMIN — FLUTICASONE PROPIONATE 2 SPRAY: 50 SPRAY, METERED NASAL at 08:17

## 2019-07-06 RX ADMIN — FERROUS SULFATE TAB 325 MG (65 MG ELEMENTAL FE) 325 MG: 325 (65 FE) TAB at 08:17

## 2019-07-06 RX ADMIN — CETIRIZINE HYDROCHLORIDE 5 MG: 10 TABLET, FILM COATED ORAL at 08:18

## 2019-07-06 RX ADMIN — HYDROCODONE BITARTRATE AND ACETAMINOPHEN 1 TABLET: 5; 325 TABLET ORAL at 08:17

## 2019-07-06 ASSESSMENT — PAIN SCALES - GENERAL
PAINLEVEL_OUTOF10: 0
PAINLEVEL_OUTOF10: 0

## 2019-07-06 NOTE — PLAN OF CARE
Nutrition Problem: Increased nutrient needs  Intervention: Food and/or Nutrient Delivery: Continue current diet, Continue current ONS  Nutritional Goals: PO intakes are greater than 75% at meals
Problem: Falls - Risk of:  Goal: Will remain free from falls  Description  Will remain free from falls  7/2/2019 1716 by Nagi Moy RN  Outcome: Ongoing  Note:   Pt remains free of falls this shift.    7/2/2019 0338 by Bradley Riddle RN  Outcome: Ongoing  Goal: Absence of physical injury  Description  Absence of physical injury  7/2/2019 1716 by Nagi Moy RN  Outcome: Ongoing  7/2/2019 0338 by Bradley Riddle RN  Outcome: Ongoing     Problem: Respiratory:  Goal: Ability to maintain a clear airway will improve  Description  Ability to maintain a clear airway will improve  7/2/2019 1716 by Nagi Moy RN  Outcome: Ongoing  Note:   Pt on room air, using flutter valve  7/2/2019 0338 by Bradley Riddle RN  Outcome: Ongoing  Goal: Ability to maintain adequate ventilation will improve  Description  Ability to maintain adequate ventilation will improve  7/2/2019 1716 by Nagi Moy RN  Outcome: Ongoing  7/2/2019 0338 by Bradley Riddle RN  Outcome: Ongoing  Goal: Complications related to the disease process, condition or treatment will be avoided or minimized  Description  Complications related to the disease process, condition or treatment will be avoided or minimized  7/2/2019 1716 by Nagi Moy RN  Outcome: Ongoing  7/2/2019 0338 by Bradley Riddle RN  Outcome: Ongoing     Problem: Skin Integrity:  Goal: Risk for impaired skin integrity will decrease  Description  Risk for impaired skin integrity will decrease  7/2/2019 1716 by Nagi Moy RN  Outcome: Ongoing  Note:   Pt has no new skin breakdown this shift, turned q2hrs  7/2/2019 0338 by Bradley Riddle RN  Outcome: Ongoing     Problem: Mobility - Impaired:  Goal: Mobility will improve  Description  Mobility will improve  7/2/2019 1716 by Nagi Moy RN  Outcome: Ongoing  Note:   Bedrest - needing moderate/max assist  7/2/2019 0338 by Bradley Riddle RN  Outcome: Ongoing     Problem: Nutrition  Goal: Optimal nutrition
Problem: Falls - Risk of:  Goal: Will remain free from falls  Description  Will remain free from falls  Outcome: Met This Shift     Problem: Respiratory:  Goal: Ability to maintain a clear airway will improve  Description  Ability to maintain a clear airway will improve  Outcome: Met This Shift     Problem: Nutrition  Goal: Optimal nutrition therapy  7/3/2019 1601 by Antonieta Heller RN  Outcome: Met This Shift  7/3/2019 1518 by Roc Delgadillo RD, LD  Outcome: Ongoing
Problem: Falls - Risk of:  Goal: Will remain free from falls  Description  Will remain free from falls  Outcome: Ongoing  Goal: Absence of physical injury  Description  Absence of physical injury  Outcome: Ongoing     Problem: Respiratory:  Goal: Ability to maintain a clear airway will improve  Description  Ability to maintain a clear airway will improve  Outcome: Ongoing  Goal: Ability to maintain adequate ventilation will improve  Description  Ability to maintain adequate ventilation will improve  Outcome: Ongoing  Goal: Complications related to the disease process, condition or treatment will be avoided or minimized  Description  Complications related to the disease process, condition or treatment will be avoided or minimized  Outcome: Ongoing     Problem: Skin Integrity:  Goal: Risk for impaired skin integrity will decrease  Description  Risk for impaired skin integrity will decrease  Outcome: Ongoing     Problem: Mobility - Impaired:  Goal: Mobility will improve  Description  Mobility will improve  Outcome: Ongoing     Problem: Nutrition  Goal: Optimal nutrition therapy  Outcome: Ongoing     Problem: Pain:  Goal: Pain level will decrease  Description  Pain level will decrease  Outcome: Ongoing  Goal: Control of acute pain  Description  Control of acute pain  Outcome: Ongoing  Goal: Control of chronic pain  Description  Control of chronic pain  Outcome: Ongoing         Patient's lung sounds remain clear but diminished. Patient turned often to avoid further breakdown. Patient remains free of falls.
Problem: Respiratory:  Goal: Ability to maintain adequate ventilation will improve  Description  Ability to maintain adequate ventilation will improve  Outcome: Ongoing  Note:   Patient stating in 90's on 4L NC     Problem: Skin Integrity:  Goal: Risk for impaired skin integrity will decrease  Description  Risk for impaired skin integrity will decrease  Outcome: Ongoing  Note:   Skin breakdown on buttocks. Mepilex on, waffle mattress on bed, patient turned Q2 and PRN.
Moves self slightly in bed. 7/4/2019 1627 by Jerrell Livingston RN  Outcome: Ongoing     Problem: Nutrition  Goal: Optimal nutrition therapy  7/5/2019 0141 by Maci Campbell RN  Outcome: Ongoing  7/4/2019 1627 by Jerrell Livingston RN  Outcome: Met This Shift     Problem: Pain:  Goal: Pain level will decrease  Description  Pain level will decrease  7/5/2019 0141 by Maci Campbell RN  Outcome: Ongoing  Note:   Patient denies pain. Positioned comfortably.   7/4/2019 1627 by Jerrell Livingston RN  Outcome: Met This Shift  Goal: Control of acute pain  Description  Control of acute pain  7/5/2019 0141 by Maci Campbell RN  Outcome: Ongoing  7/4/2019 1627 by Jerrell Livingston RN  Outcome: Met This Shift  Goal: Control of chronic pain  Description  Control of chronic pain  7/5/2019 0141 by Maci Campbell RN  Outcome: Ongoing  7/4/2019 1627 by Jerrell Livingston RN  Outcome: Met This Shift
Optimal nutrition therapy  7/6/2019 0158 by Diallo Velazquez RN  Outcome: Ongoing  7/5/2019 1840 by Sonny Wells RN  Outcome: Ongoing     Problem: Pain:  Goal: Pain level will decrease  Description  Pain level will decrease  7/6/2019 0158 by Diallo Velazquez RN  Outcome: Ongoing  Note:   Patient with minimal pain. Medicated with norco with relief of pain.   7/5/2019 1840 by Sonny Wells RN  Outcome: Ongoing  Goal: Control of acute pain  Description  Control of acute pain  7/6/2019 0158 by Diallo Velazquez RN  Outcome: Ongoing  7/5/2019 1840 by Sonny Wells RN  Outcome: Ongoing  Goal: Control of chronic pain  Description  Control of chronic pain  7/6/2019 0158 by Diallo Velazquez RN  Outcome: Ongoing  7/5/2019 1840 by Sonny Wells RN  Outcome: Ongoing

## 2019-07-06 NOTE — DISCHARGE SUMMARY
[facility] Internal Medicine    Discharge Summary     Patient ID: Alicia Aguilar  :  1945   MRN: [id number]     ACCOUNT:  [de-identified]   Patient's PCP: Jatinder Feldman MD  Admit Date: 2019   Discharge Date: 2019    Length of Stay: 8  Code Status:  Full Code  Admitting Physician: Jose Wallace MD  Discharge Physician: Norah Reaves MD     Active Discharge Diagnoses:     Primary Problem  Pneumonia of both lungs due to Mycoplasma pneumoniae      [facility] Problems    Diagnosis Date Noted    Pneumonia of both lungs due to Mycoplasma pneumoniae [J15.7] 2019    ANDREINA (obstructive sleep apnea) [G47.33] 2019    Obesity hypoventilation syndrome ([address] [E66.2] 2019    UTI (urinary tract infection) [N39.0] 2019    Elevated troponin [R74.8] 2019    Pneumonia [J18.9] 2015    LEE (acute kidney injury) ([facility] [address] [N17.9] 2015    CKD (chronic kidney disease) [N18.9] 2015    CAD (coronary artery disease) [I25.10]     Diabetes ([address] [E11.9]        Admission Condition:  fair     Discharged Condition: fair    Hospital Stay:     Hospital Course:  Alicia Aguilar is a 74 y.o. female who was admitted for the management of Pneumonia of both lungs due to Mycoplasma pneumoniae , presented to ER with Respiratory Distress  Severe morbidly obese 78-year-old very pleasant lady BMI of 52.7  From [facility]  Admitted with the fever cough feeling weak diagnosed with left lung mycoplasma pneumonia  Acute kidney injury Baseline creatinine is 1.2 was more than 2.5  XR held losartan has been held nephrology was consulted  Patient was seen in consultation with pulmonary  Patient was transiently on BiPAP in ICU  Mycoplasma IgM antibody present  At baseline on the day of discharge discharge to [facility] today with follow-up with pulmonary outpatient  Try to cut back on sedatives narcotics she probably has obesity hypoventilation and sleep apnea  Resume diuretics and losartan when creatinine is at baseline  Patient is also advised to follow-up with ENT or dentist for right-sided tongue possible leukoplakia which she may need biopsy to rule out tongue cancer she understands and agrees        Significant therapeutic interventions:     Significant Diagnostic Studies:   Labs / Micro:        ,     Radiology:    Xr Chest (single View Frontal)    Result Date: 7/3/2019  EXAMINATION: ONE XRAY VIEW OF THE CHEST 7/3/2019 6:45 am COMPARISON: 06/29/2019 HISTORY: ORDERING SYSTEM PROVIDED HISTORY: Follow-up pneumonia TECHNOLOGIST PROVIDED HISTORY: Follow-up pneumonia Ordering Physician Provided Reason for Exam: pneumonia Acuity: Acute Type of Exam: Initial FINDINGS: Nodular consolidation is again demonstrated within the right suprahilar region. Linear opacity is also seen within the right mid lung, slightly decreased since prior. No pleural effusion. No pneumothorax. Cardiac and mediastinal silhouettes are unchanged. Persistent nodular consolidation within the right upper lobe, which can reflect pneumonia, though for which underlying mass again cannot be excluded. As was previously recommended, short-term interval follow-up chest CT after treatment and resolution of any acute symptoms is recommended. Right mid lung atelectasis is slightly improved. Xr Chest Standard (2 Vw)    Result Date: 6/29/2019  EXAMINATION: TWO XRAY VIEWS OF THE CHEST 6/29/2019 11:05 am COMPARISON: 06/27/2019 HISTORY: ORDERING SYSTEM PROVIDED HISTORY: Pneumonia TECHNOLOGIST PROVIDED HISTORY: Pneumonia Ordering Physician Provided Reason for Exam: pneumonia Acuity: Unknown Type of Exam: Unknown FINDINGS: The heart and mediastinal structures are slightly shifted to the right. There is a persistent infiltrate in the right upper lobe. Underlying mass cannot be excluded. Lungs are otherwise clear. Persistent right upper lobe infiltrate.   Underlying mass cannot be excluded. As was recommended on the chest CT from 06/27/2019 follow-up CT in 3 months is recommended. Ct Chest Wo Contrast    Result Date: 6/27/2019  EXAMINATION: CT OF THE CHEST WITHOUT CONTRAST 6/27/2019 10:48 pm TECHNIQUE: CT of the chest was performed without the administration of intravenous contrast. Multiplanar reformatted images are provided for review. Dose modulation, iterative reconstruction, and/or weight based adjustment of the mA/kV was utilized to reduce the radiation dose to as low as reasonably achievable. COMPARISON: Chest CT 07/18/2013; chest radiograph 06/27/2019 HISTORY: ORDERING SYSTEM PROVIDED HISTORY: eval pna vs malignancy TECHNOLOGIST PROVIDED HISTORY: Ordering Physician Provided Reason for Exam: EVAL PNEUMONIA VS MALIGNANCY Acuity: Acute Type of Exam: Unknown Additional signs and symptoms: WHEEZING X 2 WEEKS Relevant Medical/Surgical History: HX COPD; NO CHST SURGERY FINDINGS: Mediastinum: Mild mediastinal hilar lymphadenopathy. For example, 11 mm short axis right upper paratracheal lymph node. Normal caliber great vessels. Normal heart size. Moderate multi vessel coronary calcifications. No pericardial effusion. Tiny hiatus hernia. Lungs/pleura: No pneumothorax or pleural effusion. Multifocal consolidative airspace disease with associated atelectasis, most pronounced in the right upper and superior segment right lower lobes. This is in somewhat similar distribution to exam from 07/18/2013 though seems slightly more extensive. A more nodular component is present in the right upper lobe measuring 23 x 15 mm in axial plane. Central airways are clear secretions. Upper Abdomen: Status post cholecystectomy. Focal fatty infiltration along the falciform ligament. Soft Tissues/Bones: No enlarged axillary or supraclavicular lymph nodes. Normal thyroid. No acute osseous abnormality or suspicious osseous lesion.      Multifocal consolidative airspace disease with associated tablet  Commonly known as:  ANTIVERT     omeprazole 20 MG delayed release capsule  Commonly known as:  PRILOSEC     Phenylephrine HCl 10 MG Tabs     pilocarpine 5 MG tablet  Commonly known as:  SALAGEN     potassium chloride 10 MEQ extended release capsule  Commonly known as:  MICRO-K     PREVACID 30 MG delayed release capsule  Generic drug:  lansoprazole           Where to Get Your Medications      You can get these medications from any pharmacy    Bring a paper prescription for each of these medications  · HYDROcodone-acetaminophen 5-325 MG per tablet     Information about where to get these medications is not yet available    Ask your nurse or doctor about these medications  · albuterol (2.5 MG/3ML) 0.083% nebulizer solution  · azithromycin 500 MG tablet  · benzonatate 200 MG capsule  · guaiFENesin 600 MG extended release tablet         Time Spent on discharge is  35 mins in patient examination, evaluation, counseling as well as medication reconciliation, prescriptions for required medications, discharge plan and follow up. Electronically signed by   Yuliana Foley MD  7/6/2019  9:23 AM      Thank you Dr. Claudia Ballard MD for the opportunity to be involved in this patient's care.

## 2019-07-28 PROBLEM — R79.89 ELEVATED TROPONIN: Status: RESOLVED | Noted: 2019-06-28 | Resolved: 2019-07-28

## 2019-07-28 PROBLEM — N39.0 UTI (URINARY TRACT INFECTION): Status: RESOLVED | Noted: 2019-06-28 | Resolved: 2019-07-28

## 2019-07-28 PROBLEM — R77.8 ELEVATED TROPONIN: Status: RESOLVED | Noted: 2019-06-28 | Resolved: 2019-07-28

## 2019-10-11 PROBLEM — K63.5 COLON POLYP: Status: ACTIVE | Noted: 2017-10-16

## 2019-10-11 PROBLEM — R19.5 OCCULT BLOOD IN STOOLS: Status: ACTIVE | Noted: 2017-10-16

## 2019-10-11 PROBLEM — F32.A DEPRESSION: Status: ACTIVE | Noted: 2017-10-16

## 2019-10-11 PROBLEM — I63.50 CEREBRAL ARTERY OCCLUSION WITH CEREBRAL INFARCTION (HCC): Status: ACTIVE | Noted: 2017-10-16

## 2019-10-11 PROBLEM — F41.9 ANXIETY: Status: ACTIVE | Noted: 2017-10-16

## 2019-10-11 PROBLEM — I25.10 CORONARY ATHEROSCLEROSIS: Status: ACTIVE | Noted: 2017-10-16

## 2019-10-11 PROBLEM — D12.6 TUBULAR ADENOMA OF COLON: Status: ACTIVE | Noted: 2017-10-16

## 2019-10-11 PROBLEM — I10 HYPERTENSION: Status: ACTIVE | Noted: 2017-10-16

## 2019-10-11 PROBLEM — K31.7 HYPERPLASTIC POLYP OF STOMACH: Status: ACTIVE | Noted: 2017-10-16

## 2019-10-11 PROBLEM — M19.90 ARTHRITIS: Status: ACTIVE | Noted: 2017-10-16

## 2019-10-11 PROBLEM — R56.9 SEIZURE (HCC): Status: ACTIVE | Noted: 2017-10-16

## 2019-10-11 PROBLEM — K21.9 GERD (GASTROESOPHAGEAL REFLUX DISEASE): Status: ACTIVE | Noted: 2017-10-16

## 2019-10-11 PROBLEM — E86.0 LUETSCHER'S SYNDROME: Status: ACTIVE | Noted: 2017-10-16

## 2019-10-11 PROBLEM — R30.0 DYSURIA: Status: ACTIVE | Noted: 2019-04-24

## 2019-10-22 ENCOUNTER — OFFICE VISIT (OUTPATIENT)
Dept: INFECTIOUS DISEASES | Age: 74
End: 2019-10-22
Payer: MEDICARE

## 2019-10-22 VITALS
WEIGHT: 275 LBS | OXYGEN SATURATION: 98 % | HEART RATE: 69 BPM | SYSTOLIC BLOOD PRESSURE: 102 MMHG | BODY MASS INDEX: 51.92 KG/M2 | TEMPERATURE: 96.9 F | DIASTOLIC BLOOD PRESSURE: 53 MMHG | HEIGHT: 61 IN

## 2019-10-22 DIAGNOSIS — Z16.12 URINARY TRACT INFECTION DUE TO EXTENDED-SPECTRUM BETA LACTAMASE (ESBL) PRODUCING ESCHERICHIA COLI: Primary | ICD-10-CM

## 2019-10-22 DIAGNOSIS — N39.0 URINARY TRACT INFECTION DUE TO EXTENDED-SPECTRUM BETA LACTAMASE (ESBL) PRODUCING ESCHERICHIA COLI: Primary | ICD-10-CM

## 2019-10-22 DIAGNOSIS — N39.0 RECURRENT UTI (URINARY TRACT INFECTION): ICD-10-CM

## 2019-10-22 DIAGNOSIS — B96.29 URINARY TRACT INFECTION DUE TO EXTENDED-SPECTRUM BETA LACTAMASE (ESBL) PRODUCING ESCHERICHIA COLI: Primary | ICD-10-CM

## 2019-10-22 PROCEDURE — 1123F ACP DISCUSS/DSCN MKR DOCD: CPT | Performed by: INTERNAL MEDICINE

## 2019-10-22 PROCEDURE — G8400 PT W/DXA NO RESULTS DOC: HCPCS | Performed by: INTERNAL MEDICINE

## 2019-10-22 PROCEDURE — 1090F PRES/ABSN URINE INCON ASSESS: CPT | Performed by: INTERNAL MEDICINE

## 2019-10-22 PROCEDURE — G8427 DOCREV CUR MEDS BY ELIG CLIN: HCPCS | Performed by: INTERNAL MEDICINE

## 2019-10-22 PROCEDURE — 4040F PNEUMOC VAC/ADMIN/RCVD: CPT | Performed by: INTERNAL MEDICINE

## 2019-10-22 PROCEDURE — G8598 ASA/ANTIPLAT THER USED: HCPCS | Performed by: INTERNAL MEDICINE

## 2019-10-22 PROCEDURE — 1036F TOBACCO NON-USER: CPT | Performed by: INTERNAL MEDICINE

## 2019-10-22 PROCEDURE — 3017F COLORECTAL CA SCREEN DOC REV: CPT | Performed by: INTERNAL MEDICINE

## 2019-10-22 PROCEDURE — G8417 CALC BMI ABV UP PARAM F/U: HCPCS | Performed by: INTERNAL MEDICINE

## 2019-10-22 PROCEDURE — G8484 FLU IMMUNIZE NO ADMIN: HCPCS | Performed by: INTERNAL MEDICINE

## 2019-10-22 PROCEDURE — 99203 OFFICE O/P NEW LOW 30 MIN: CPT | Performed by: INTERNAL MEDICINE

## 2019-10-22 RX ORDER — GRANULES FOR ORAL 3 G/1
3 POWDER ORAL ONCE
Qty: 1 EACH | Refills: 0 | Status: SHIPPED | OUTPATIENT
Start: 2019-10-22 | End: 2019-10-22

## 2019-10-22 ASSESSMENT — ENCOUNTER SYMPTOMS
RESPIRATORY NEGATIVE: 1
GASTROINTESTINAL NEGATIVE: 1

## 2020-01-21 ENCOUNTER — APPOINTMENT (OUTPATIENT)
Dept: CT IMAGING | Age: 75
DRG: 682 | End: 2020-01-21
Payer: MEDICARE

## 2020-01-21 ENCOUNTER — HOSPITAL ENCOUNTER (INPATIENT)
Age: 75
LOS: 4 days | Discharge: NURSING, REHAB OR LONG-TERM CARE | DRG: 682 | End: 2020-01-26
Attending: EMERGENCY MEDICINE | Admitting: INTERNAL MEDICINE
Payer: MEDICARE

## 2020-01-21 ENCOUNTER — APPOINTMENT (OUTPATIENT)
Dept: GENERAL RADIOLOGY | Age: 75
DRG: 682 | End: 2020-01-21
Payer: MEDICARE

## 2020-01-21 LAB
ABSOLUTE EOS #: 0.85 K/UL (ref 0–0.4)
ABSOLUTE IMMATURE GRANULOCYTE: ABNORMAL K/UL (ref 0–0.3)
ABSOLUTE LYMPH #: 1.7 K/UL (ref 1–4.8)
ABSOLUTE MONO #: 0.57 K/UL (ref 0.1–1.3)
ALBUMIN SERPL-MCNC: 3.2 G/DL (ref 3.5–5.2)
ALBUMIN/GLOBULIN RATIO: ABNORMAL (ref 1–2.5)
ALLEN TEST: ABNORMAL
ALP BLD-CCNC: 120 U/L (ref 35–104)
ALT SERPL-CCNC: <5 U/L (ref 5–33)
AMMONIA: 34 UMOL/L (ref 11–51)
ANION GAP SERPL CALCULATED.3IONS-SCNC: 18 MMOL/L (ref 9–17)
AST SERPL-CCNC: 12 U/L
BASOPHILS # BLD: 0 % (ref 0–2)
BASOPHILS ABSOLUTE: 0 K/UL (ref 0–0.2)
BILIRUB SERPL-MCNC: 0.2 MG/DL (ref 0.3–1.2)
BUN BLDV-MCNC: 99 MG/DL (ref 8–23)
BUN/CREAT BLD: ABNORMAL (ref 9–20)
CALCIUM SERPL-MCNC: 10.1 MG/DL (ref 8.6–10.4)
CARBOXYHEMOGLOBIN: 2 % (ref 0–5)
CHLORIDE BLD-SCNC: 96 MMOL/L (ref 98–107)
CO2: 22 MMOL/L (ref 20–31)
CREAT SERPL-MCNC: 3.33 MG/DL (ref 0.5–0.9)
DIFFERENTIAL TYPE: ABNORMAL
EOSINOPHILS RELATIVE PERCENT: 6 % (ref 0–4)
FIO2: ABNORMAL
GFR AFRICAN AMERICAN: 16 ML/MIN
GFR NON-AFRICAN AMERICAN: 13 ML/MIN
GFR SERPL CREATININE-BSD FRML MDRD: ABNORMAL ML/MIN/{1.73_M2}
GFR SERPL CREATININE-BSD FRML MDRD: ABNORMAL ML/MIN/{1.73_M2}
GLUCOSE BLD-MCNC: 88 MG/DL (ref 70–99)
HCO3 VENOUS: 24.7 MMOL/L (ref 24–30)
HCT VFR BLD CALC: 35.1 % (ref 36–46)
HEMOGLOBIN: 11.7 G/DL (ref 12–16)
IMMATURE GRANULOCYTES: ABNORMAL %
LIPASE: 37 U/L (ref 13–60)
LYMPHOCYTES # BLD: 12 % (ref 24–44)
MAGNESIUM: 2.2 MG/DL (ref 1.6–2.6)
MCH RBC QN AUTO: 30.3 PG (ref 26–34)
MCHC RBC AUTO-ENTMCNC: 33.5 G/DL (ref 31–37)
MCV RBC AUTO: 90.5 FL (ref 80–100)
METHEMOGLOBIN: 0.2 % (ref 0–1.9)
MODE: ABNORMAL
MONOCYTES # BLD: 4 % (ref 1–7)
MORPHOLOGY: NORMAL
NEGATIVE BASE EXCESS, VEN: 1.5 MMOL/L (ref 0–2)
NOTIFICATION TIME: ABNORMAL
NOTIFICATION: ABNORMAL
NRBC AUTOMATED: ABNORMAL PER 100 WBC
O2 DEVICE/FLOW/%: ABNORMAL
O2 SAT, VEN: 94 % (ref 60–85)
OXYHEMOGLOBIN: ABNORMAL % (ref 95–98)
PATIENT TEMP: 37
PCO2, VEN, TEMP ADJ: ABNORMAL MMHG (ref 39–55)
PCO2, VEN: 48.5 (ref 39–55)
PDW BLD-RTO: 13.7 % (ref 11.5–14.9)
PEEP/CPAP: ABNORMAL
PH VENOUS: 7.32 (ref 7.32–7.42)
PH, VEN, TEMP ADJ: ABNORMAL (ref 7.32–7.42)
PLATELET # BLD: 293 K/UL (ref 150–450)
PLATELET ESTIMATE: ABNORMAL
PMV BLD AUTO: 8 FL (ref 6–12)
PO2, VEN, TEMP ADJ: ABNORMAL MMHG (ref 30–50)
PO2, VEN: 78.3 (ref 30–50)
POSITIVE BASE EXCESS, VEN: ABNORMAL MMOL/L (ref 0–2)
POTASSIUM SERPL-SCNC: 5.1 MMOL/L (ref 3.7–5.3)
PSV: ABNORMAL
PT. POSITION: ABNORMAL
RBC # BLD: 3.87 M/UL (ref 4–5.2)
RBC # BLD: ABNORMAL 10*6/UL
RESPIRATORY RATE: ABNORMAL
SAMPLE SITE: ABNORMAL
SEG NEUTROPHILS: 78 % (ref 36–66)
SEGMENTED NEUTROPHILS ABSOLUTE COUNT: 11.08 K/UL (ref 1.3–9.1)
SET RATE: ABNORMAL
SODIUM BLD-SCNC: 136 MMOL/L (ref 135–144)
TEXT FOR RESPIRATORY: ABNORMAL
TOTAL HB: ABNORMAL G/DL (ref 12–16)
TOTAL PROTEIN: 7 G/DL (ref 6.4–8.3)
TOTAL RATE: ABNORMAL
TSH SERPL DL<=0.05 MIU/L-ACNC: 3.61 MIU/L (ref 0.3–5)
VT: ABNORMAL
WBC # BLD: 14.2 K/UL (ref 3.5–11)
WBC # BLD: ABNORMAL 10*3/UL

## 2020-01-21 PROCEDURE — 85025 COMPLETE CBC W/AUTO DIFF WBC: CPT

## 2020-01-21 PROCEDURE — 83690 ASSAY OF LIPASE: CPT

## 2020-01-21 PROCEDURE — 87086 URINE CULTURE/COLONY COUNT: CPT

## 2020-01-21 PROCEDURE — 99285 EMERGENCY DEPT VISIT HI MDM: CPT

## 2020-01-21 PROCEDURE — 71045 X-RAY EXAM CHEST 1 VIEW: CPT

## 2020-01-21 PROCEDURE — 36415 COLL VENOUS BLD VENIPUNCTURE: CPT

## 2020-01-21 PROCEDURE — 70450 CT HEAD/BRAIN W/O DYE: CPT

## 2020-01-21 PROCEDURE — 82805 BLOOD GASES W/O2 SATURATION: CPT

## 2020-01-21 PROCEDURE — 82550 ASSAY OF CK (CPK): CPT

## 2020-01-21 PROCEDURE — 82140 ASSAY OF AMMONIA: CPT

## 2020-01-21 PROCEDURE — 82800 BLOOD PH: CPT

## 2020-01-21 PROCEDURE — 84443 ASSAY THYROID STIM HORMONE: CPT

## 2020-01-21 PROCEDURE — 83735 ASSAY OF MAGNESIUM: CPT

## 2020-01-21 PROCEDURE — 80053 COMPREHEN METABOLIC PANEL: CPT

## 2020-01-21 RX ORDER — 0.9 % SODIUM CHLORIDE 0.9 %
1000 INTRAVENOUS SOLUTION INTRAVENOUS ONCE
Status: COMPLETED | OUTPATIENT
Start: 2020-01-21 | End: 2020-01-22

## 2020-01-21 NOTE — LETTER
Whats most important for you to know is that your Medicare rights and benefits wont change because your health care provider is participating in [facility] [address] [place]. Medicare will keep covering all of your medically necessary services. Even though Medicare will pay your doctor in a different way under [employer], how much you have to pay wont change. Health care providers and suppliers who are enrolled in Medicare will submit their Medicare claims like they always have. Youll have all the same Medicare rights and protections, including the right to choose which hospital, doctor, or other health care provider you see. If you dont want to get care from a health care provider whos participating in [facility] [address] [place], then youll have to choose a different health care provider whos not participating in the Model. How can I give feedback about my health care? Medicare might ask you to take a voluntary survey about the services and care you received from [facility] during your hospital stay or outpatient procedure and for a specific period of time afterwards. You can decide whether you want to take the voluntary survey, but if you do, itll help Medicare make [employer] and the care of other Medicare patients better. If you have concerns or complaints about your care, you can:   · Talk to your doctor or health care provider. · Contact your Beneficiary and Family Centered Care Quality Improvement   Organization [employer] [facility]). You can get your [employer]s phone number at  Medicare.gov/contacts or by calling 1-800-MEDICARE. TTY users can call  3-605.987.7236. Where can I learn more about [employer]? Learn more about [employer] at https://innovation.cms.gov/initiatives/bpci-advanced/:  · A list of all the hospitals and physician group practices in the country participating in [facility] [address] [place].   · All of the inpatient and outpatient Clinical Episodes that are currently included under [employer]. A Clinical Episode is a grouping of medical conditions or diagnoses that are included in the [id number] [facility].

## 2020-01-22 ENCOUNTER — APPOINTMENT (OUTPATIENT)
Dept: CT IMAGING | Age: 75
DRG: 682 | End: 2020-01-22
Payer: MEDICARE

## 2020-01-22 PROBLEM — G93.41 METABOLIC ENCEPHALOPATHY: Status: ACTIVE | Noted: 2020-01-22

## 2020-01-22 PROBLEM — N18.9 ACUTE KIDNEY INJURY SUPERIMPOSED ON CKD (HCC): Status: ACTIVE | Noted: 2020-01-22

## 2020-01-22 PROBLEM — N17.9 ACUTE KIDNEY INJURY SUPERIMPOSED ON CKD (HCC): Status: ACTIVE | Noted: 2020-01-22

## 2020-01-22 PROBLEM — F05 DELIRIUM DUE TO GENERAL MEDICAL CONDITION: Status: ACTIVE | Noted: 2020-01-22

## 2020-01-22 LAB
-: NORMAL
ALBUMIN SERPL-MCNC: 2.8 G/DL (ref 3.5–5.2)
ALBUMIN/GLOBULIN RATIO: ABNORMAL (ref 1–2.5)
ALP BLD-CCNC: 106 U/L (ref 35–104)
ALT SERPL-CCNC: <5 U/L (ref 5–33)
AMORPHOUS: NORMAL
ANION GAP SERPL CALCULATED.3IONS-SCNC: 15 MMOL/L (ref 9–17)
AST SERPL-CCNC: 11 U/L
BACTERIA: NORMAL
BILIRUB SERPL-MCNC: 0.16 MG/DL (ref 0.3–1.2)
BILIRUBIN URINE: ABNORMAL
BUN BLDV-MCNC: 94 MG/DL (ref 8–23)
BUN/CREAT BLD: ABNORMAL (ref 9–20)
CALCIUM SERPL-MCNC: 9.8 MG/DL (ref 8.6–10.4)
CASTS UA: NORMAL /LPF
CHLORIDE BLD-SCNC: 105 MMOL/L (ref 98–107)
CO2: 21 MMOL/L (ref 20–31)
COLOR: YELLOW
COMMENT UA: ABNORMAL
CREAT SERPL-MCNC: 2.87 MG/DL (ref 0.5–0.9)
CREATININE URINE: 67.8 MG/DL (ref 28–217)
CRYSTALS, UA: NORMAL /HPF
EKG ATRIAL RATE: 96 BPM
EKG P AXIS: 51 DEGREES
EKG P-R INTERVAL: 134 MS
EKG Q-T INTERVAL: 382 MS
EKG QRS DURATION: 110 MS
EKG QTC CALCULATION (BAZETT): 482 MS
EKG R AXIS: 98 DEGREES
EKG T AXIS: 9 DEGREES
EKG VENTRICULAR RATE: 96 BPM
EPITHELIAL CELLS UA: NORMAL /HPF
GFR AFRICAN AMERICAN: 19 ML/MIN
GFR NON-AFRICAN AMERICAN: 16 ML/MIN
GFR SERPL CREATININE-BSD FRML MDRD: ABNORMAL ML/MIN/{1.73_M2}
GFR SERPL CREATININE-BSD FRML MDRD: ABNORMAL ML/MIN/{1.73_M2}
GLUCOSE BLD-MCNC: 69 MG/DL (ref 65–105)
GLUCOSE BLD-MCNC: 83 MG/DL (ref 65–105)
GLUCOSE BLD-MCNC: 85 MG/DL (ref 65–105)
GLUCOSE BLD-MCNC: 90 MG/DL (ref 65–105)
GLUCOSE BLD-MCNC: 91 MG/DL (ref 65–105)
GLUCOSE BLD-MCNC: 95 MG/DL (ref 70–99)
GLUCOSE URINE: NEGATIVE
HCT VFR BLD CALC: 32.4 % (ref 36–46)
HEMOGLOBIN: 10.4 G/DL (ref 12–16)
KEPPRA: 22 UG/ML
KETONES, URINE: ABNORMAL
LEUKOCYTE ESTERASE, URINE: NEGATIVE
MCH RBC QN AUTO: 29.2 PG (ref 26–34)
MCHC RBC AUTO-ENTMCNC: 32.1 G/DL (ref 31–37)
MCV RBC AUTO: 91 FL (ref 80–100)
MUCUS: NORMAL
NITRITE, URINE: NEGATIVE
NRBC AUTOMATED: ABNORMAL PER 100 WBC
OTHER OBSERVATIONS UA: NORMAL
PDW BLD-RTO: 13.8 % (ref 11.5–14.9)
PH UA: 5 (ref 5–8)
PLATELET # BLD: 307 K/UL (ref 150–450)
PMV BLD AUTO: 8.3 FL (ref 6–12)
POTASSIUM SERPL-SCNC: 4.7 MMOL/L (ref 3.7–5.3)
PROTEIN UA: NEGATIVE
RBC # BLD: 3.56 M/UL (ref 4–5.2)
RBC UA: NORMAL /HPF
RENAL EPITHELIAL, UA: NORMAL /HPF
SODIUM BLD-SCNC: 141 MMOL/L (ref 135–144)
SODIUM,UR: 24 MMOL/L
SODIUM,UR: <20 MMOL/L
SPECIFIC GRAVITY UA: 1.01 (ref 1–1.03)
THYROXINE, FREE: 1.29 NG/DL (ref 0.93–1.7)
TOTAL CK: 87 U/L (ref 26–192)
TOTAL PROTEIN, URINE: 14 MG/DL
TOTAL PROTEIN: 6.6 G/DL (ref 6.4–8.3)
TRICHOMONAS: NORMAL
TURBIDITY: CLEAR
UREA NITROGEN, UR: 763 MG/DL
URINE HGB: NEGATIVE
UROBILINOGEN, URINE: NORMAL
WBC # BLD: 13.9 K/UL (ref 3.5–11)
WBC UA: NORMAL /HPF
YEAST: NORMAL

## 2020-01-22 PROCEDURE — 81001 URINALYSIS AUTO W/SCOPE: CPT

## 2020-01-22 PROCEDURE — 2580000003 HC RX 258: Performed by: NURSE PRACTITIONER

## 2020-01-22 PROCEDURE — 82947 ASSAY GLUCOSE BLOOD QUANT: CPT

## 2020-01-22 PROCEDURE — 36415 COLL VENOUS BLD VENIPUNCTURE: CPT

## 2020-01-22 PROCEDURE — 93010 ELECTROCARDIOGRAM REPORT: CPT | Performed by: INTERNAL MEDICINE

## 2020-01-22 PROCEDURE — 80053 COMPREHEN METABOLIC PANEL: CPT

## 2020-01-22 PROCEDURE — 6370000000 HC RX 637 (ALT 250 FOR IP): Performed by: NURSE PRACTITIONER

## 2020-01-22 PROCEDURE — 84439 ASSAY OF FREE THYROXINE: CPT

## 2020-01-22 PROCEDURE — 2580000003 HC RX 258: Performed by: STUDENT IN AN ORGANIZED HEALTH CARE EDUCATION/TRAINING PROGRAM

## 2020-01-22 PROCEDURE — 84300 ASSAY OF URINE SODIUM: CPT

## 2020-01-22 PROCEDURE — 84156 ASSAY OF PROTEIN URINE: CPT

## 2020-01-22 PROCEDURE — 74176 CT ABD & PELVIS W/O CONTRAST: CPT

## 2020-01-22 PROCEDURE — 2060000000 HC ICU INTERMEDIATE R&B

## 2020-01-22 PROCEDURE — 2580000003 HC RX 258: Performed by: INTERNAL MEDICINE

## 2020-01-22 PROCEDURE — 93005 ELECTROCARDIOGRAM TRACING: CPT | Performed by: STUDENT IN AN ORGANIZED HEALTH CARE EDUCATION/TRAINING PROGRAM

## 2020-01-22 PROCEDURE — 84540 ASSAY OF URINE/UREA-N: CPT

## 2020-01-22 PROCEDURE — 85027 COMPLETE CBC AUTOMATED: CPT

## 2020-01-22 PROCEDURE — 82570 ASSAY OF URINE CREATININE: CPT

## 2020-01-22 PROCEDURE — 6360000002 HC RX W HCPCS: Performed by: NURSE PRACTITIONER

## 2020-01-22 PROCEDURE — 99291 CRITICAL CARE FIRST HOUR: CPT | Performed by: INTERNAL MEDICINE

## 2020-01-22 PROCEDURE — 80177 DRUG SCRN QUAN LEVETIRACETAM: CPT

## 2020-01-22 PROCEDURE — 99223 1ST HOSP IP/OBS HIGH 75: CPT | Performed by: PSYCHIATRY & NEUROLOGY

## 2020-01-22 PROCEDURE — 6360000002 HC RX W HCPCS: Performed by: INTERNAL MEDICINE

## 2020-01-22 RX ORDER — ONDANSETRON 2 MG/ML
4 INJECTION INTRAMUSCULAR; INTRAVENOUS ONCE
Status: DISCONTINUED | OUTPATIENT
Start: 2020-01-22 | End: 2020-01-22

## 2020-01-22 RX ORDER — POTASSIUM CHLORIDE 750 MG/1
10 CAPSULE, EXTENDED RELEASE ORAL 2 TIMES DAILY
Status: DISCONTINUED | OUTPATIENT
Start: 2020-01-22 | End: 2020-01-26 | Stop reason: HOSPADM

## 2020-01-22 RX ORDER — BENZONATATE 100 MG/1
100 CAPSULE ORAL 3 TIMES DAILY PRN
Status: DISCONTINUED | OUTPATIENT
Start: 2020-01-22 | End: 2020-01-22

## 2020-01-22 RX ORDER — SODIUM CHLORIDE 0.9 % (FLUSH) 0.9 %
10 SYRINGE (ML) INJECTION EVERY 12 HOURS SCHEDULED
Status: DISCONTINUED | OUTPATIENT
Start: 2020-01-22 | End: 2020-01-26 | Stop reason: HOSPADM

## 2020-01-22 RX ORDER — IPRATROPIUM BROMIDE AND ALBUTEROL SULFATE 2.5; .5 MG/3ML; MG/3ML
1 SOLUTION RESPIRATORY (INHALATION) EVERY 4 HOURS PRN
COMMUNITY

## 2020-01-22 RX ORDER — SODIUM CHLORIDE 9 MG/ML
INJECTION, SOLUTION INTRAVENOUS CONTINUOUS
Status: DISCONTINUED | OUTPATIENT
Start: 2020-01-22 | End: 2020-01-24

## 2020-01-22 RX ORDER — M-VIT,TX,IRON,MINS/CALC/FOLIC 27MG-0.4MG
1 TABLET ORAL DAILY
Status: DISCONTINUED | OUTPATIENT
Start: 2020-01-22 | End: 2020-01-26 | Stop reason: HOSPADM

## 2020-01-22 RX ORDER — BISACODYL 10 MG
10 SUPPOSITORY, RECTAL RECTAL DAILY PRN
Status: DISCONTINUED | OUTPATIENT
Start: 2020-01-22 | End: 2020-01-26 | Stop reason: HOSPADM

## 2020-01-22 RX ORDER — POLYVINYL ALCOHOL 14 MG/ML
1 SOLUTION/ DROPS OPHTHALMIC 2 TIMES DAILY
Status: DISCONTINUED | OUTPATIENT
Start: 2020-01-22 | End: 2020-01-26 | Stop reason: HOSPADM

## 2020-01-22 RX ORDER — SODIUM CHLORIDE 0.9 % (FLUSH) 0.9 %
10 SYRINGE (ML) INJECTION PRN
Status: DISCONTINUED | OUTPATIENT
Start: 2020-01-22 | End: 2020-01-26 | Stop reason: HOSPADM

## 2020-01-22 RX ORDER — DOCUSATE SODIUM 100 MG/1
100 CAPSULE, LIQUID FILLED ORAL 2 TIMES DAILY PRN
Status: DISCONTINUED | OUTPATIENT
Start: 2020-01-22 | End: 2020-01-26 | Stop reason: HOSPADM

## 2020-01-22 RX ORDER — NICOTINE 21 MG/24HR
1 PATCH, TRANSDERMAL 24 HOURS TRANSDERMAL DAILY PRN
Status: DISCONTINUED | OUTPATIENT
Start: 2020-01-22 | End: 2020-01-26 | Stop reason: HOSPADM

## 2020-01-22 RX ORDER — INSULIN GLARGINE 100 [IU]/ML
20 INJECTION, SOLUTION SUBCUTANEOUS DAILY
Status: DISCONTINUED | OUTPATIENT
Start: 2020-01-22 | End: 2020-01-26 | Stop reason: HOSPADM

## 2020-01-22 RX ORDER — GUAIFENESIN 600 MG/1
1200 TABLET, EXTENDED RELEASE ORAL 2 TIMES DAILY
Status: DISCONTINUED | OUTPATIENT
Start: 2020-01-22 | End: 2020-01-26 | Stop reason: HOSPADM

## 2020-01-22 RX ORDER — IPRATROPIUM BROMIDE AND ALBUTEROL SULFATE 2.5; .5 MG/3ML; MG/3ML
1 SOLUTION RESPIRATORY (INHALATION) EVERY 4 HOURS PRN
Status: DISCONTINUED | OUTPATIENT
Start: 2020-01-22 | End: 2020-01-26 | Stop reason: HOSPADM

## 2020-01-22 RX ORDER — METOPROLOL TARTRATE 50 MG/1
50 TABLET, FILM COATED ORAL 2 TIMES DAILY
Status: DISCONTINUED | OUTPATIENT
Start: 2020-01-22 | End: 2020-01-26 | Stop reason: HOSPADM

## 2020-01-22 RX ORDER — LEVETIRACETAM 500 MG/1
500 TABLET ORAL 2 TIMES DAILY
Status: DISCONTINUED | OUTPATIENT
Start: 2020-01-22 | End: 2020-01-26 | Stop reason: HOSPADM

## 2020-01-22 RX ORDER — LOPERAMIDE HYDROCHLORIDE 2 MG/1
2 CAPSULE ORAL EVERY 6 HOURS PRN
Status: DISCONTINUED | OUTPATIENT
Start: 2020-01-22 | End: 2020-01-22

## 2020-01-22 RX ORDER — MORPHINE SULFATE 4 MG/ML
4 INJECTION, SOLUTION INTRAMUSCULAR; INTRAVENOUS ONCE
Status: DISCONTINUED | OUTPATIENT
Start: 2020-01-22 | End: 2020-01-22

## 2020-01-22 RX ORDER — 0.9 % SODIUM CHLORIDE 0.9 %
500 INTRAVENOUS SOLUTION INTRAVENOUS ONCE
Status: COMPLETED | OUTPATIENT
Start: 2020-01-22 | End: 2020-01-22

## 2020-01-22 RX ORDER — INSULIN GLARGINE 100 [IU]/ML
5 INJECTION, SOLUTION SUBCUTANEOUS NIGHTLY
Status: ON HOLD | COMMUNITY
End: 2020-01-26 | Stop reason: HOSPADM

## 2020-01-22 RX ORDER — FERROUS SULFATE 325(65) MG
325 TABLET ORAL 2 TIMES DAILY
Status: DISCONTINUED | OUTPATIENT
Start: 2020-01-22 | End: 2020-01-22

## 2020-01-22 RX ORDER — DIPHENHYDRAMINE HCL 25 MG
25 TABLET ORAL EVERY 6 HOURS PRN
Status: DISCONTINUED | OUTPATIENT
Start: 2020-01-22 | End: 2020-01-22

## 2020-01-22 RX ORDER — HYDROCODONE BITARTRATE AND ACETAMINOPHEN 5; 325 MG/1; MG/1
1 TABLET ORAL EVERY 12 HOURS PRN
Status: DISCONTINUED | OUTPATIENT
Start: 2020-01-22 | End: 2020-01-26 | Stop reason: HOSPADM

## 2020-01-22 RX ORDER — MECLIZINE HYDROCHLORIDE 25 MG/1
25 TABLET ORAL 3 TIMES DAILY PRN
COMMUNITY

## 2020-01-22 RX ORDER — CETIRIZINE HYDROCHLORIDE 10 MG/1
10 TABLET ORAL DAILY
Status: DISCONTINUED | OUTPATIENT
Start: 2020-01-22 | End: 2020-01-22

## 2020-01-22 RX ORDER — ACETAMINOPHEN 325 MG/1
650 TABLET ORAL EVERY 4 HOURS PRN
Status: DISCONTINUED | OUTPATIENT
Start: 2020-01-22 | End: 2020-01-26 | Stop reason: HOSPADM

## 2020-01-22 RX ORDER — OMEPRAZOLE 20 MG/1
20 CAPSULE, DELAYED RELEASE ORAL DAILY
COMMUNITY

## 2020-01-22 RX ORDER — BENZONATATE 100 MG/1
100 CAPSULE ORAL 3 TIMES DAILY PRN
Status: ON HOLD | COMMUNITY
End: 2020-01-26 | Stop reason: HOSPADM

## 2020-01-22 RX ORDER — SODIUM POLYSTYRENE SULFONATE 15 G/60ML
30 SUSPENSION ORAL; RECTAL
Status: DISPENSED | OUTPATIENT
Start: 2020-01-22 | End: 2020-01-22

## 2020-01-22 RX ORDER — GUAIFENESIN 600 MG/1
1200 TABLET, EXTENDED RELEASE ORAL 2 TIMES DAILY
COMMUNITY

## 2020-01-22 RX ORDER — ONDANSETRON 2 MG/ML
4 INJECTION INTRAMUSCULAR; INTRAVENOUS EVERY 6 HOURS PRN
Status: DISCONTINUED | OUTPATIENT
Start: 2020-01-22 | End: 2020-01-22

## 2020-01-22 RX ORDER — NICOTINE POLACRILEX 4 MG
15 LOZENGE BUCCAL PRN
Status: DISCONTINUED | OUTPATIENT
Start: 2020-01-22 | End: 2020-01-26 | Stop reason: HOSPADM

## 2020-01-22 RX ORDER — LOSARTAN POTASSIUM 25 MG/1
25 TABLET ORAL DAILY
COMMUNITY

## 2020-01-22 RX ORDER — DEXTROSE MONOHYDRATE 25 G/50ML
12.5 INJECTION, SOLUTION INTRAVENOUS PRN
Status: DISCONTINUED | OUTPATIENT
Start: 2020-01-22 | End: 2020-01-26 | Stop reason: HOSPADM

## 2020-01-22 RX ORDER — ATORVASTATIN CALCIUM 20 MG/1
20 TABLET, FILM COATED ORAL DAILY
Status: DISCONTINUED | OUTPATIENT
Start: 2020-01-22 | End: 2020-01-26 | Stop reason: HOSPADM

## 2020-01-22 RX ORDER — FLUTICASONE PROPIONATE 50 MCG
2 SPRAY, SUSPENSION (ML) NASAL DAILY
Status: DISCONTINUED | OUTPATIENT
Start: 2020-01-22 | End: 2020-01-26 | Stop reason: HOSPADM

## 2020-01-22 RX ORDER — HYDROCODONE BITARTRATE AND ACETAMINOPHEN 5; 325 MG/1; MG/1
1 TABLET ORAL EVERY 12 HOURS PRN
Status: ON HOLD | COMMUNITY
End: 2020-01-26 | Stop reason: SDUPTHER

## 2020-01-22 RX ORDER — HEPARIN SODIUM 5000 [USP'U]/ML
5000 INJECTION, SOLUTION INTRAVENOUS; SUBCUTANEOUS EVERY 8 HOURS SCHEDULED
Status: DISCONTINUED | OUTPATIENT
Start: 2020-01-22 | End: 2020-01-26 | Stop reason: HOSPADM

## 2020-01-22 RX ORDER — SODIUM POLYSTYRENE SULFONATE 15 G/60ML
15 SUSPENSION ORAL; RECTAL
Status: DISPENSED | OUTPATIENT
Start: 2020-01-22 | End: 2020-01-22

## 2020-01-22 RX ORDER — LEVOTHYROXINE SODIUM 0.1 MG/1
100 TABLET ORAL DAILY
Status: DISCONTINUED | OUTPATIENT
Start: 2020-01-22 | End: 2020-01-26 | Stop reason: HOSPADM

## 2020-01-22 RX ORDER — POTASSIUM CHLORIDE 750 MG/1
10 CAPSULE, EXTENDED RELEASE ORAL 2 TIMES DAILY
COMMUNITY

## 2020-01-22 RX ORDER — DEXTROSE MONOHYDRATE 50 MG/ML
100 INJECTION, SOLUTION INTRAVENOUS PRN
Status: DISCONTINUED | OUTPATIENT
Start: 2020-01-22 | End: 2020-01-26 | Stop reason: HOSPADM

## 2020-01-22 RX ORDER — FUROSEMIDE 40 MG/1
40 TABLET ORAL DAILY
Status: ON HOLD | COMMUNITY
End: 2020-02-05 | Stop reason: SDUPTHER

## 2020-01-22 RX ORDER — INSULIN GLARGINE 100 [IU]/ML
5 INJECTION, SOLUTION SUBCUTANEOUS NIGHTLY
Status: DISCONTINUED | OUTPATIENT
Start: 2020-01-22 | End: 2020-01-26 | Stop reason: HOSPADM

## 2020-01-22 RX ORDER — CITALOPRAM 20 MG/1
20 TABLET ORAL DAILY
Status: DISCONTINUED | OUTPATIENT
Start: 2020-01-22 | End: 2020-01-26 | Stop reason: HOSPADM

## 2020-01-22 RX ADMIN — HEPARIN SODIUM 5000 UNITS: 5000 INJECTION INTRAVENOUS; SUBCUTANEOUS at 14:38

## 2020-01-22 RX ADMIN — HEPARIN SODIUM 5000 UNITS: 5000 INJECTION INTRAVENOUS; SUBCUTANEOUS at 20:44

## 2020-01-22 RX ADMIN — POTASSIUM CHLORIDE 10 MEQ: 10 CAPSULE, COATED, EXTENDED RELEASE ORAL at 20:44

## 2020-01-22 RX ADMIN — METOPROLOL TARTRATE 50 MG: 50 TABLET, FILM COATED ORAL at 20:44

## 2020-01-22 RX ADMIN — Medication 10 ML: at 20:54

## 2020-01-22 RX ADMIN — LEVETIRACETAM 500 MG: 100 INJECTION, SOLUTION INTRAVENOUS at 14:38

## 2020-01-22 RX ADMIN — SODIUM CHLORIDE 500 ML: 9 INJECTION, SOLUTION INTRAVENOUS at 08:34

## 2020-01-22 RX ADMIN — HEPARIN SODIUM 5000 UNITS: 5000 INJECTION INTRAVENOUS; SUBCUTANEOUS at 08:34

## 2020-01-22 RX ADMIN — SODIUM CHLORIDE 1000 ML: 9 INJECTION, SOLUTION INTRAVENOUS at 00:40

## 2020-01-22 RX ADMIN — SODIUM CHLORIDE: 9 INJECTION, SOLUTION INTRAVENOUS at 04:35

## 2020-01-22 RX ADMIN — DIPHENHYDRAMINE HCL 25 MG: 25 TABLET ORAL at 04:31

## 2020-01-22 RX ADMIN — LEVETIRACETAM 500 MG: 500 TABLET ORAL at 20:44

## 2020-01-22 RX ADMIN — GUAIFENESIN 1200 MG: 600 TABLET, EXTENDED RELEASE ORAL at 20:44

## 2020-01-22 RX ADMIN — HYDROCODONE BITARTRATE AND ACETAMINOPHEN 1 TABLET: 5; 325 TABLET ORAL at 04:31

## 2020-01-22 RX ADMIN — SODIUM CHLORIDE: 9 INJECTION, SOLUTION INTRAVENOUS at 08:43

## 2020-01-22 RX ADMIN — SODIUM CHLORIDE: 9 INJECTION, SOLUTION INTRAVENOUS at 19:54

## 2020-01-22 ASSESSMENT — PAIN SCALES - GENERAL
PAINLEVEL_OUTOF10: 6
PAINLEVEL_OUTOF10: 6
PAINLEVEL_OUTOF10: 4
PAINLEVEL_OUTOF10: 7

## 2020-01-22 ASSESSMENT — ENCOUNTER SYMPTOMS
ABDOMINAL PAIN: 1
EYE DISCHARGE: 0
EYE REDNESS: 0
CHEST TIGHTNESS: 0
SHORTNESS OF BREATH: 0

## 2020-01-22 NOTE — PROGRESS NOTES
Dr. Marcie Ling rounded at bedside with patient. Some medications adjusted, neuro consult and lab orders placed.

## 2020-01-22 NOTE — FLOWSHEET NOTE
01/22/20 1739   Encounter Summary   Services provided to: Patient   Referral/Consult From: Rounding   Complexity of Encounter Low   Length of Encounter 15 minutes   Spiritual/Anabaptism   Type Spiritual support   Assessment Sleeping   Intervention Prayer   Outcome Did not respond

## 2020-01-22 NOTE — ED NOTES
Removed 20 ga. IV access in pt.  L antecubital, established by EMS; IV was not running, very positional.     Fiona Morelos, EMT-P  01/22/20 3971

## 2020-01-22 NOTE — ED NOTES
Report given to TRUNG Henderson from [place]. Report method in person   The following was reviewed with receiving RN:   Current vital signs:  BP (!) 100/50   Pulse 87   Temp 97.5 °F (36.4 °C) (Oral)   Resp 17   Wt 270 lb (122.5 kg)   SpO2 96%   BMI 50.33 kg/m²                      Any medication or safety alerts were reviewed. Any pending diagnostics and notifications were also reviewed, as well as any safety concerns or issues, abnormal labs, abnormal imaging, and abnormal assessment findings. Questions were answered.           Sarthak Lang RN  01/22/20 [id number]

## 2020-01-22 NOTE — PROGRESS NOTES
Dr. Brendan Phelps rounded on pt at bedside, agrees with current plan of care (pt/ot and EEG) and has no further orders for writer at this time.

## 2020-01-22 NOTE — ED PROVIDER NOTES
[facility] ED  Emergency Department Encounter  Emergency Medicine Resident     Pt Name: Annel Contreras  MRN: [id number]  [place] 1945  Date of evaluation: 1/22/20  PCP:  Ankur Cueva MD    CHIEF COMPLAINT       Chief Complaint   Patient presents with    Other     possible alter mental status; possible UTI       HISTORY OFPRESENT ILLNESS  (Location/Symptom, Timing/Onset, Context/Setting, Quality, Duration, Modifying Factors,Severity.)      Annel Contreras is a 66 y.o. female who presents from the nursing home with altered mental status. Patient is normally alert and conversational per report from nursing home. She reportedly has been receiving IM Rocephin for a \"elevated white blood cell count. \"  Urinalysis performed at the nursing home was reportedly normal.  She states that everything is hurting and she is feeling fatigued. PAST MEDICAL / SURGICAL / SOCIAL / FAMILY HISTORY      has a past medical history of AMF (acute myelofibrosis) ([address] Anxiety, ARF (acute renal failure) ([address] Arthritis, CAD (coronary artery disease), Cancer of tonsil ([address] Colon polyp, COPD (chronic obstructive pulmonary disease) ([address] Dehydration, Depression, Diabetes mellitus type 2, controlled ([address] DJD (degenerative joint disease), Encephalopathy, Examination of participant in clinical trial, GERD (gastroesophageal reflux disease), Guaiac positive stools, Herpes, Hyperkalemia, Hyperplastic polyp of stomach, Hypertension, Hypothyroid, Multiple gastric polyps, Obesity, Positive FIT (fecal immunochemical test), Seizure ([address] Tubular adenoma of colon, Unspecified cerebral artery occlusion with cerebral infarction, and Unspecified diseases of blood and blood-forming organs. has a past surgical history that includes Appendectomy; Hysterectomy; Cholecystectomy; Tonsillectomy; Upper gastrointestinal endoscopy (03/20/2017);  Colonoscopy (03/20/2017); pr colonoscopy w/biopsy single/multiple (N/A, 3/20/2017); and pr egd transoral biopsy single/multiple (N/A, 3/20/2017). Social History     Socioeconomic History    Marital status:      Spouse name: Not on file    Number of children: Not on file    Years of education: Not on file    Highest education level: Not on file   Occupational History    Not on file   Social Needs    Financial resource strain: Not on file    Food insecurity:     Worry: Not on file     Inability: Not on file    Transportation needs:     Medical: Not on file     Non-medical: Not on file   Tobacco Use    Smoking status: Never Smoker    Smokeless tobacco: Never Used   Substance and Sexual Activity    Alcohol use: No    Drug use: No    Sexual activity: Never   Lifestyle    Physical activity:     Days per week: Not on file     Minutes per session: Not on file    Stress: Not on file   Relationships    Social connections:     Talks on phone: Not on file     Gets together: Not on file     Attends [employer]ze service: Not on file     Active member of club or organization: Not on file     Attends meetings of clubs or organizations: Not on file     Relationship status: Not on file    Intimate partner violence:     Fear of current or ex partner: Not on file     Emotionally abused: Not on file     Physically abused: Not on file     Forced sexual activity: Not on file   Other Topics Concern    Not on file   Social History Narrative    Not on file       No family history on file. Allergies:  Dye [iodides]; Ioxaglate; Vancomycin; Ciprofloxacin; Eggs or egg-derived products; Mushroom extract complex; and Sulfa antibiotics    Home Medications:  Prior to Admission medications    Medication Sig Start Date End Date Taking?  Authorizing Provider   insulin glargine (LANTUS) 100 UNIT/ML injection vial Inject 5 Units into the skin nightly   Yes Historical Provider, MD   benzocaine-menthol (CEPACOL SORE THROAT) 15-3.6 MG lozenge Take 1 lozenge by mouth every 2 hours as needed for Sore (TRULICITY) 1.5 JL/8.1HU SOPN Inject 1 Dose into the skin once a week fridays    Historical Provider, MD   aspirin 81 MG EC tablet Take 1 tablet by mouth daily. 12/27/14   Kar Jeronimo MD   metoprolol (LOPRESSOR) 50 MG tablet Take 1 tablet by mouth 2 times daily. 12/27/14   Kar Jeronimo MD   atorvastatin (LIPITOR) 20 MG tablet Take 20 mg by mouth daily. Historical Provider, MD   cetirizine (ZYRTEC) 10 MG tablet Take 10 mg by mouth daily. Historical Provider, MD   citalopram (CELEXA) 20 MG tablet Take 20 mg by mouth daily. Historical Provider, MD   fluticasone (FLONASE) 50 MCG/ACT nasal spray 2 sprays by Nasal route daily. Historical Provider, MD   gabapentin (NEURONTIN) 300 MG capsule Take 400 mg by mouth nightly. Historical Provider, MD   Insulin Aspart (NOVOLOG SC) Inject 6 Units into the skin 2 times daily Indications: Sliding scale Breakfast and lunch    Historical Provider, MD   levETIRAcetam (KEPPRA) 500 MG tablet Take 500 mg by mouth 2 times daily. Historical Provider, MD   levothyroxine (SYNTHROID) 100 MCG tablet Take 100 mcg by mouth Daily. Historical Provider, MD   Multiple Vitamins-Minerals (MULTIVITAMIN PO) Take 1 tablet by mouth daily. Historical Provider, MD   mupirocin (BACTROBAN) 2 % ointment Apply topically 2 times daily Apply to nares topically two times a day for nasal dryness. Historical Provider, MD   cycloSPORINE (RESTASIS) 0.05 % ophthalmic emulsion Place 1 drop into both eyes 2 times daily. Historical Provider, MD   bisacodyl (DULCOLAX) 10 MG suppository Place 10 mg rectally daily as needed for Constipation. Indications: for constipation    Historical Provider, MD   diphenhydrAMINE (BENADRYL) 25 MG capsule Take 25 mg by mouth every 6 hours as needed for Itching or Sleep     Historical Provider, MD   docusate sodium (COLACE) 100 MG capsule Take 100 mg by mouth 2 times daily as needed for Constipation.  Indications: for constipation    Historical Provider MD       REVIEW OF SYSTEMS    (2-9 systems for level 4, 10 or more for level 5)      Review of Systems   Constitutional: Positive for fatigue. Negative for chills and fever. Eyes: Negative for discharge and redness. Respiratory: Negative for chest tightness and shortness of breath. Cardiovascular: Positive for chest pain. Gastrointestinal: Positive for abdominal pain. Genitourinary: Negative for dysuria and flank pain. Musculoskeletal: Positive for myalgias. Skin: Negative for rash. Allergic/Immunologic: Positive for environmental allergies. Neurological: Negative for headaches. Psychiatric/Behavioral: Positive for confusion. Negative for agitation. PHYSICAL EXAM   (up to 7 for level 4, 8 or more for level 5)     INITIAL VITALS:    oral temperature is 97.5 °F (36.4 °C). Her blood pressure is 135/73 and her pulse is 97. Her respiration is 17 and oxygen saturation is 95%. Physical Exam  Vitals signs and nursing note reviewed. Constitutional:       Appearance: She is well-developed. HENT:      Head: Normocephalic and atraumatic. Eyes:      General: No scleral icterus. Conjunctiva/sclera: Conjunctivae normal.      Pupils: Pupils are equal, round, and reactive to light. Neck:      Trachea: No tracheal deviation. Cardiovascular:      Rate and Rhythm: Normal rate and regular rhythm. Heart sounds: Normal heart sounds. No murmur. No friction rub. No gallop. Pulmonary:      Effort: Pulmonary effort is normal. No respiratory distress. Breath sounds: Normal breath sounds. No wheezing or rales. Abdominal:      General: Bowel sounds are normal. There is no distension. Palpations: Abdomen is soft. There is no mass. Tenderness: There is tenderness. There is no guarding or rebound. Comments: Generalized tenderness with no focality, no guarding no masses no rebound tenderness. Musculoskeletal: Normal range of motion.    Skin:     General: Skin is warm and to generalized involutional changes as on prior imaging studies. Intracranial vascular calcifications. ORBITS: The visualized portion of the orbits demonstrate no acute abnormality. SINUSES: Moderate to severe ethmoid sinus and sphenoid sinus mucosal thickening. The moderate mucosal thickening extending into left frontal sinus. SOFT TISSUES/SKULL:  No acute abnormality of the visualized skull or soft tissues. No acute intracranial abnormality. Generalized involutional changes and chronic small vessel ischemic disease. Hypoattenuation along the right precentral gyrus suggestive of remote stroke. Please correlate exam findings. Moderate severe paranasal sinus disease. Xr Chest Portable    Result Date: 1/21/2020  EXAMINATION: ONE XRAY VIEW OF THE CHEST 1/21/2020 11:28 pm COMPARISON: July 3, 2019 HISTORY: ORDERING SYSTEM PROVIDED HISTORY: AMS TECHNOLOGIST PROVIDED HISTORY: AMS Reason for Exam: AMS Acuity: Acute Type of Exam: Initial FINDINGS: Ill-defined right upper and lower lobe nodular densities persist.  Lungs otherwise clear. Heart and mediastinum normal.  Bony thorax intact. Persistent right upper lobe nodular density. Recommend follow-up CT chest with IV contrast to exclude neoplasm. Ct Chest Abdomen Pelvis Wo Contrast    Result Date: 1/22/2020  EXAMINATION: CT OF THE CHEST, ABDOMEN, AND PELVIS WITHOUT CONTRAST 1/22/2020 12:10 am TECHNIQUE: CT of the chest, abdomen and pelvis was performed without the administration of intravenous contrast. Multiplanar reformatted images are provided for review. Dose modulation, iterative reconstruction, and/or weight based adjustment of the mA/kV was utilized to reduce the radiation dose to as low as reasonably achievable.  COMPARISON: Chest x-ray 01/21/2020 CT chest 06/27/2019 HISTORY: ORDERING SYSTEM PROVIDED HISTORY: altered mental status new renal failure unknown etiology TECHNOLOGIST PROVIDED HISTORY: altered mental status new renal failure unknown etiology Reason for Exam: AMS, decreased renal function, unable to obtain accurate patient history due to pt condition Acuity: Unknown Relevant Medical/Surgical History: surgeries to area of interest (per epic) include hysterectomy, appendectony, cholecystectomy FINDINGS: Chest: Mediastinum: There is no mediastinal adenopathy or hematoma. Calcified plaque in the aortic arch. No evidence of thoracic aortic aneurysm. Calcific coronary artery disease. The heart size is normal.  Calcification in the mitral annulus. Lungs/pleura: There are scattered irregular parenchymal opacities consistent with residual scarring from the multifocal airspace disease evident on 06/27/2019. No acute infiltrate, pneumothorax or pleural effusion. Soft Tissues/Bones: No acute bone or soft tissue abnormality. Abdomen/Pelvis: Organs: The gallbladder is surgically absent. The liver, spleen, pancreas and adrenal glands are grossly normal with the limitations of a noncontrast study. There is unchanged left renal atrophy. There is bilateral renal sinus lipomatosis. GI/Bowel: Unopacified bowel loops are unremarkable. No bowel obstruction. No evidence of appendicitis. Pelvis: Urinary bladder is grossly normal.  The uterus is apparently surgically absent Peritoneum/Retroperitoneum: There is no adenopathy, free air or free fluid. Bones/Soft Tissues: No acute bone or soft tissue abnormality. Obesity. No acute finding in the chest, abdomen or pelvis. Scattered irregular parenchymal opacities are most consistent with residual scarring from the multifocal consolidative airspace disease/pneumonia evident on 06/27/2019. There is unchanged left renal atrophy. No evidence of hydronephrosis.        EKG  EKG Interpretation    Interpreted by me    Rhythm: normal sinus   Rate: normal  Axis: normal  Ectopy: none  Conduction: Right bundle branch block, , , QTc 482  ST Segments: no acute change  T Waves: Nonspecific T wave changes lead II and III  Q Waves: none    Clinical Impression: no acute changes and normal EKG    All EKG's are interpreted by the Emergency Department Physician who either signs or Co-signs this chart in the absence of a cardiologist.    EMERGENCY DEPARTMENT COURSE:  ED Course as of Jan 22 0359 Tue Jan 21, 2020   2326 Creatinine(!): 3.33 [MS]   2337 BUN(!!): 99 [MS]   Wed Jan 22, 2020   0005 WBC(!): 14.2 [MS]      ED Course User Index  [MS] Chela Tidwell DO     CT scan negative showed no acute abnormalities. BUN markedly elevated at 99 and creatinine elevated at 3.33. Admit patient to medicine service for LEE. PROCEDURES:  None    CONSULTS:  None    CRITICAL CARE:  Please see attending note    FINAL IMPRESSION      1.  LEE (acute kidney injury) ([facility]          DISPOSITION / PLAN     DISPOSITION      Admitted to medicine service    PATIENTREFERRED TO:  Kyrie Clements, [address] [facility]  [phone]            DISCHARGE MEDICATIONS:  New Prescriptions    No medications on file       Chela Tidwell DO  EmergencyMedicine Resident    (Please note that portions of this note were completed with a voice recognition program.  Efforts were made to edit the dictations but occasionally words are mis-transcribed.)     Chela Tidwell DO  Resident  01/22/20 8367

## 2020-01-22 NOTE — CONSULTS
NEPHROLOGY CONSULT     Patient :  Fidelia Muniz; 66 y.o. MRN# [id number]  Location:  2015/2015-01  Attending:  Cece Saha MD  Admit Date:  1/21/2020   Hospital Day: 0      Reason for Consult: LEE on CKD      Chief Complaint:  Confused, change in mental status. History Obtained From: EMR, nursing staff patient is not responding to the questions not opening eyes nonverbal    History of Present Illness: This is a 66 y.o. female  past medical history of coronary artery disease, COPD, type 2 diabetes, cancer of the tonsil, essential hypertension, hypothyroidism, CKD stage 3 [baseline creatinine1.6 to 1.8 mg/dL], who presented to the hospital on 1/22/20 with altered mental status, confusion and lethargy. Patient was sent from [address] [place] [address] after she was found to be confused creasing responsiveness  Patient is admitted to ICU for possible sepsis. UA negative for leukocyte Estrace negative for nitrite, negative for protein no blood WBCs 5-10 RBC 0-10  chest x-ray persistent right upper lobe nodular density    CT head no acute intracranial abnormality, generalized involutional changes and chronic small vessel ischemic disease moderate severe paranasal sinus disease, hypoattenuation along the right paracentral gyrus suggestive of remote stroke    CT chest showed scattered irregular parenchymal opacities most consistent with residual scarring from multifocal consolidative airspace disease/pneumonia  Hydronephrosis. Patient has a history of CKD with baseline creatinine of 1.3 to 1.6 mg/dL on admission serum creatinine was noted to be 3.3 mg/dL and therefore nephrology consultation has been requested. Pt denies any history of  prolonged NSAID use. Patient denies dysuria, gross hematuria, flank pain, nocturia, urgency, passing frothy urine or urinary incontinence. There has been no recent exposure to IV contrast.   There is no history  of paraprotein disease.    Pt denies any history of recurrent UTI or kidney (36.4 °C), Max:97.5 °F (36.4 °C)    CURRENT RESPIRATORY RATE:  Resp: 18  CURRENT PULSE:  Pulse: 81  CURRENT BLOOD PRESSURE:  BP: (!) 109/49  24HR BLOOD PRESSURE RANGE:  Systolic (09ZLW), CBM:952 , Min:100 , LIS:297   ; Diastolic (98HBJ), YIJ:44, Min:43, Max:75    24HR INTAKE/OUTPUT:      Intake/Output Summary (Last 24 hours) at 1/22/2020 4031  Last data filed at 1/22/2020 0140  Gross per 24 hour   Intake 1000 ml   Output --   Net 1000 ml     Patient Vitals for the past 96 hrs (Last 3 readings):   Weight   01/22/20 0647 270 lb (122.5 kg)       Physical Exam:  GENERAL APPEARANCE: Lethargic, not responding, not in acute distress  HEAD: normocephalic  EYES: PERRL, EOMI. Not pale, anicteric   NOSE:  No nasal discharge. THROAT:  Oral cavity and pharynx normal. Moist  CARDIAC: Normal S1 and S2. No S3, S4 or murmurs. Rhythm is regular. LUNGS: Clear to auscultation and percussion without rales, rhonchi, wheezing or diminished breath sounds. NECK: Neck supple, non-tender without lymphadenopathy, masses or thyromegaly. JVD-neg  BACK: Examination of the spine reveals normal gait and posture, no spinal deformity, symmetry of spinal muscles, without tenderness, decreased range of motion or muscular spasm  MUSKULOSKELETAL: Adequately aligned spine. No joint erythema or tenderness. EXTREMITIES: No edema. Peripheral pulses intact. NEURO: Patient is not moving her arms and legs not responding to questions      Labs:   CBC:  Recent Labs     01/21/20 2236 01/22/20  0628   WBC 14.2* 13.9*   RBC 3.87* 3.56*   HGB 11.7* 10.4*   HCT 35.1* 32.4*   MCV 90.5 91.0   MCH 30.3 29.2   MCHC 33.5 32.1   RDW 13.7 13.8    307   MPV 8.0 8.3      BMP:   Recent Labs     01/21/20 2236 01/22/20  0628    141   K 5.1 4.7   CL 96* 105   CO2 22 21   BUN 99* 94*   CREATININE 3.33* 2.87*   GLUCOSE 88 95   CALCIUM 10.1 9.8      Phosphorus:  No results for input(s): PHOS in the last 72 hours.   Magnesium:   Recent Labs     01/21/20  8812 MG 2.2     Albumin:   Recent Labs     01/21/20  2236 01/22/20  0628   LABALBU 3.2* 2.8*       IRON:  No results for input(s): IRON in the last 72 hours. Iron Saturation:  Invalid input(s): PERCENTFE  TIBC:  No results for input(s): TIBC in the last 72 hours. FERRITIN:  No results for input(s): FERRITIN in the last 72 hours. SPEP: No results for input(s): SPEP in the last 72 hours. Recent Labs     01/22/20  0628   PROT 6.6     UPEP: No results for input(s): TPU in the last 72 hours. Urine Sodium:  No results for input(s): DEBBIE in the last 72 hours. Urine Potassium: No results for input(s): KUR in the last 72 hours. Urine Chloride: Invalid input(s): CLU  Urine Ph:  Invalid input(s): PO4U  Urine Osmolarity: No results for input(s): OSMOU in the last 72 hours. Urine Creatinine:  No results for input(s): LABCREA in the last 72 hours. Urine Eosinophils: Invalid input(s): EOSU  Urine Protein:  No results for input(s): TPU in the last 72 hours. Urinalysis:    Recent Labs     01/22/20  0015   NITRU NEGATIVE   COLORU YELLOW   PHUR 5.0   WBCUA 5 TO 10   RBCUA 0 TO 2   MUCUS NOT REPORTED   TRICHOMONAS NOT REPORTED   YEAST NOT REPORTED   BACTERIA None   SPECGRAV 1.015   LEUKOCYTESUR NEGATIVE   UROBILINOGEN Normal   BILIRUBINUR Presumptive positive. Unable to confirm due to unavailability of reagent. *   [address] [place] SMALL*   AMORPHOUS NOT REPORTED         Radiology:  Reviewed as available. Assessment:  1. LEE on CKD, nonoliguric most likely secondary to prerenal azotemia  2. Acute encephalopathy, rule out stroke  3. Chronic kidney disease stage III  4. History of obstructive sleep apnea  5. History of essential hypertension. 6. History of type 2 diabetes insulin-dependent diabetes mellitus  7. Leukocytosis, rule out sepsis       Plan:  Hold Lasix and losartan. Continue IV fluids. Strict I's and O's    1.   Comprehensive urine testing including Urinalysis, Urine sodium, potassium, chloride, Urine protein and creatinine to quantify the proteinuria if present. Will check urinary eosinophils. 2.  Check Post void bladder scan and Renal Ultrasound to assess for urinary obstruction and to assess the kidney size, echogenicity. 3. Will Order serum and urine protein electrophoresis to r/o element of occult dysproteinemia. Thank you for the consultation.       Electronically signed by Melyssa Torres MD on 1/22/2020 at 9:52 AM

## 2020-01-22 NOTE — H&P
[address] [facility]     HISTORY AND PHYSICAL EXAMINATION            Date:   1/22/2020  Patientname:  Annel Contreras  Date of admission:  1/21/2020 10:10 PM  MRN:   [id number]  Account:  [de-identified]  YOB: 1945  PCP:    Ankur Cueva MD  Room:   2015/2015-01  Code Status:    Full Code    CHIEF COMPLAINT     Chief Complaint   Patient presents with    Other     possible alter mental status; possible UTI       HISTORY OF PRESENT ILLNESS  (Character, Onset, Location, Duration,  Exacerbating/RelievingFactors, Radiation,   Associated Symptoms, Severity )      The patient is a 66 y.o.  female, with a history of morbid obesity, CKD, COPD, DM type 2, and obesity hypoventilation syndrome, who presents from [facility] for altered mental status with possible UTI. At time of exam, patient is somnolent, confused, and unable to provide any input into HPI. According to ED, patient's ECF reports that patient is normally alert, oriented, and conversant. She is currently being treated with IM Rocephin for elevated white blood count, though urine reportedly tested negative for UTI. Symptoms are associated with fatigue, and generalized pain. Denies fever, chills, cough, nausea, vomiting, diarrhea, and urinary symptoms. There are no aggravating or alleviating factors. Symptoms are reported as constant and moderate.       PAST MEDICAL HISTORY   Patient  has a past medical history of AMF (acute myelofibrosis) ([address] Anxiety, ARF (acute renal failure) ([address] Arthritis, CAD (coronary artery disease), Cancer of tonsil ([address] Colon polyp, COPD (chronic obstructive pulmonary disease) ([address] Dehydration, Depression, Diabetes mellitus type 2, controlled ([address] DJD (degenerative joint disease), Encephalopathy, Examination of participant in clinical trial, GERD (gastroesophageal reflux disease), Guaiac positive stools, Herpes, Hyperkalemia, Hyperplastic polyp of stomach, Hypertension, Hypothyroid, Multiple gastric polyps, Obesity, Positive FIT (fecal immunochemical test), Seizure ([address] Tubular adenoma of colon, Unspecified cerebral artery occlusion with cerebral infarction, and Unspecified diseases of blood and blood-forming organs. PAST SURGICAL HISTORY    Patient  has a past surgical history that includes Appendectomy; Hysterectomy; Cholecystectomy; Tonsillectomy; Upper gastrointestinal endoscopy (03/20/2017); Colonoscopy (03/20/2017); pr colonoscopy w/biopsy single/multiple (N/A, 3/20/2017); and pr egd transoral biopsy single/multiple (N/A, 3/20/2017). FAMILY HISTORY    Patient family history is not on file. SOCIAL HISTORY    Patient  reports that she has never smoked. She has never used smokeless tobacco. She reports that she does not drink alcohol or use drugs. HOME MEDICATIONS        Prior to Admission medications    Medication Sig Start Date End Date Taking?  Authorizing Provider   insulin glargine (LANTUS) 100 UNIT/ML injection vial Inject 5 Units into the skin nightly   Yes Historical Provider, MD   benzocaine-menthol (CEPACOL SORE THROAT) 15-3.6 MG lozenge Take 1 lozenge by mouth every 2 hours as needed for Sore Throat or Other (cough)    Historical Provider, MD   guaiFENesin (MUCINEX) 600 MG extended release tablet Take 1,200 mg by mouth 2 times daily    Historical Provider, MD   losartan (COZAAR) 25 MG tablet Take 25 mg by mouth daily    Historical Provider, MD   ipratropium-albuterol (DUONEB) 0.5-2.5 (3) MG/3ML SOLN nebulizer solution Inhale 1 vial into the lungs every 4 hours as needed for Shortness of Breath or Other (wheezing)    Historical Provider, MD   potassium chloride (MICRO-K) 10 MEQ extended release capsule Take 10 mEq by mouth 2 times daily    Historical Provider, MD   furosemide (LASIX) 40 MG tablet Take 40 mg by mouth daily    Historical Provider, MD   meclizine (ANTIVERT) 25 MG tablet Take 25 mg by mouth 3 times daily as needed for Dizziness    Historical Provider, MD   HYDROcodone-acetaminophen (NORCO) 5-325 MG per tablet Take 1 tablet by mouth every 12 hours as needed for Pain. Historical Provider, MD   omeprazole (PRILOSEC) 20 MG delayed release capsule Take 20 mg by mouth Daily    Historical Provider, MD   benzonatate (TESSALON) 100 MG capsule Take 100 mg by mouth 3 times daily as needed for Cough    Historical Provider, MD   Estradiol 10 % CREA by Each Nostril route nightly For hormone therapy    Historical Provider, MD   ferrous sulfate 325 (65 Fe) MG tablet Take 325 mg by mouth 2 times daily    Historical Provider, MD   loperamide (IMODIUM) 2 MG capsule Take 2 mg by mouth every 6 hours as needed for Diarrhea    Historical Provider, MD   Calcium Carb-Cholecalciferol (CALTRATE 600+D) 600-800 MG-UNIT TABS Take 1 tablet by mouth daily    Historical Provider, MD   Cranberry 475 MG CAPS Take 2 tablets by mouth daily    Historical Provider, MD   insulin glargine (LANTUS) 100 UNIT/ML injection vial Inject 20 Units into the skin daily    Historical Provider, MD   insulin aspart (NOVOLOG) 100 UNIT/ML injection vial Inject 12 Units into the skin Daily with supper     Historical Provider, MD   Dulaglutide (TRULICITY) 1.5 TQ/6.1UY SOPN Inject 1 Dose into the skin once a week fridays    Historical Provider, MD   aspirin 81 MG EC tablet Take 1 tablet by mouth daily. 12/27/14   Kar Jeronimo MD   metoprolol (LOPRESSOR) 50 MG tablet Take 1 tablet by mouth 2 times daily. 12/27/14   Kar Jeronimo MD   atorvastatin (LIPITOR) 20 MG tablet Take 20 mg by mouth daily. Historical Provider, MD   cetirizine (ZYRTEC) 10 MG tablet Take 10 mg by mouth daily. Historical Provider, MD   citalopram (CELEXA) 20 MG tablet Take 20 mg by mouth daily. Historical Provider, MD   fluticasone (FLONASE) 50 MCG/ACT nasal spray 2 sprays by Nasal route daily. Historical Provider, MD   gabapentin (NEURONTIN) 300 MG capsule Take 400 mg by mouth nightly.      Historical Provider, MD Insulin Aspart (NOVOLOG SC) Inject 6 Units into the skin 2 times daily Indications: Sliding scale Breakfast and lunch    Historical Provider, MD   levETIRAcetam (KEPPRA) 500 MG tablet Take 500 mg by mouth 2 times daily. Historical Provider, MD   levothyroxine (SYNTHROID) 100 MCG tablet Take 100 mcg by mouth Daily. Historical Provider, MD   Multiple Vitamins-Minerals (MULTIVITAMIN PO) Take 1 tablet by mouth daily. Historical Provider, MD   mupirocin (BACTROBAN) 2 % ointment Apply topically 2 times daily Apply to nares topically two times a day for nasal dryness. Historical Provider, MD   cycloSPORINE (RESTASIS) 0.05 % ophthalmic emulsion Place 1 drop into both eyes 2 times daily. Historical Provider, MD   bisacodyl (DULCOLAX) 10 MG suppository Place 10 mg rectally daily as needed for Constipation. Indications: for constipation    Historical Provider, MD   diphenhydrAMINE (BENADRYL) 25 MG capsule Take 25 mg by mouth every 6 hours as needed for Itching or Sleep     Historical Provider, MD   docusate sodium (COLACE) 100 MG capsule Take 100 mg by mouth 2 times daily as needed for Constipation. Indications: for constipation    Historical Provider, MD       ALLERGIES      Dye [iodides]; Ioxaglate; Vancomycin; Ciprofloxacin; Eggs or egg-derived products; Mushroom extract complex; and Sulfa antibiotics    REVIEW OF SYSTEMS     Review of Systems   Unable to perform ROS: Mental status change     PHYSICAL EXAM      BP (!) 109/49   Pulse 81   Temp 97.5 °F (36.4 °C) (Axillary)   Resp 18   Wt 270 lb (122.5 kg)   SpO2 95%   BMI 50.33 kg/m²  Body mass index is 50.33 kg/m². Physical Exam  Constitutional:       General: She is not in acute distress. Appearance: She is well-developed. She is obese. She is not diaphoretic. HENT:      Head: Normocephalic and atraumatic. Eyes:      Conjunctiva/sclera: Conjunctivae normal.      Pupils: Pupils are equal, round, and reactive to light.    Neck: 01/21/20 2236 01/22/20  0628   AST 12 11   ALT <5* <5*   ALKPHOS 120* 106*     CARDIAC ENZY:   Recent Labs     01/21/20 2236   CKTOTAL 87     INR: No results for input(s): INR in the last 72 hours. BNP: No results for input(s): PROBNP in the last 72 hours. ABGs: No results for input(s): PH, PCO2, PO2, HCO3, O2SAT in the last 72 hours. Lipids: No results for input(s): CHOL, TRIG, HDL, LDLCALC in the last 72 hours. Invalid input(s): LDL  Pancreatic functions:  Recent Labs     01/21/20 2236   LIPASE 37     S. LacticAcid: No results for input(s): LACTA in the last 72 hours. Thyroid functions:   Lab Results   Component Value Date    TSH 3.61 01/21/2020      U/A:  Recent Labs     01/22/20  0015   COLORU YELLOW   WBCUA 5 TO 10   RBCUA 0 TO 2   MUCUS NOT REPORTED   BACTERIA None   SPECGRAV 1.015   LEUKOCYTESUR NEGATIVE   GLUCOSEU NEGATIVE   AMORPHOUS NOT REPORTED       Imaging/Diagonstics:     Ct Head Wo Contrast    Result Date: 1/22/2020  EXAMINATION: CT OF THE HEAD WITHOUT CONTRAST  1/21/2020 11:56 pm TECHNIQUE: CT of the head was performed without the administration of intravenous contrast. Dose modulation, iterative reconstruction, and/or weight based adjustment of the mA/kV was utilized to reduce the radiation dose to as low as reasonably achievable. COMPARISON: MRI brain 12/26/2014 HISTORY: ORDERING SYSTEM PROVIDED HISTORY: Altered mental status TECHNOLOGIST PROVIDED HISTORY: Altered mental status Reason for Exam: AMS Acuity: Unknown FINDINGS: BRAIN/VENTRICLES: There is no acute intracranial hemorrhage, mass effect or acute midline shift. No acute extra-axial fluid collection. Hypoattenuation identified along the cortex of the right precentral gyrus likely related to remote stroke however please correlate exam findings. Otherwise, gray-white differentiation is maintained without evidence of an acute infarct. There is no evidence of hydrocephalus.   Moderate periventricular subcortical white matter nephrologist  -monitor BMP    Diabetes Mellitus  -Continue home dose long acting insulin  -POCT ac and hs with sliding scale coverage    Consultations:     Bria Silverman MD   1/22/2020  12:22 PM    [address]  [address] Phone  and add on       I have discussed the care of Arielle West ,   including pertinent history and exam findings,      1/22/20   with the Nuvia Eldridge CNP  I have seen and examined the patient and the key elements of all parts of the encounter have been performed by me . I agree with the assessment, plan and orders as documented by the resident. Active Problems:    Diabetes ([facility]    Hypertension    BMI 50.0-59.9, adult (HCC)    Depression    ANDREINA (obstructive sleep apnea)    Obesity hypoventilation syndrome ([facility]    Acute kidney injury superimposed on CKD ([facility]    Delirium due to general medical condition  Resolved Problems:    * No resolved hospital problems. *        -Patient has been somewhat obtunded  She is on multiple medications  Her blood sugar was 80 this morning  She is on a lot of insulin  She has a history of seizure disorder and is on Keppra  We will order an EEG  Will order Keppra level  Will request neuro consult      Condition    [x] ill ,     [x] high risk , [] critical ,          [] improved but still labile                                        [x] delirium ,      [x] --obtunded ---,                 [x] I-polypharmacy---     Unit  [x] ICU           [] PICU       [] MED_[facility]             []  Other    Prognosis     ---                   Medications: Allergies:     Allergies   Allergen Reactions    Dye [Iodides] Rash     MRI/CT dye    Ioxaglate Rash     MRI/CT dye  MRI/CT dye      Vancomycin Rash    Ciprofloxacin     Eggs Or Egg-Derived Products     Mushroom Extract Complex     Sulfa Antibiotics        Current Meds:   Scheduled Meds:    atorvastatin

## 2020-01-22 NOTE — PLAN OF CARE
Problem: Safety:  Goal: Free from accidental physical injury  Description  Free from accidental physical injury  Outcome: Met This Shift  Goal: Free from intentional harm  Description  Free from intentional harm  Outcome: Met This Shift     Problem: Daily Care:  Goal: Daily care needs are met  Description  Daily care needs are met  Outcome: Met This Shift     Problem: Pain:  Goal: Patient's pain/discomfort is manageable  Description  Patient's pain/discomfort is manageable  Outcome: Ongoing     Problem: Skin Integrity:  Goal: Skin integrity will stabilize  Description  Skin integrity will stabilize  Outcome: Ongoing

## 2020-01-22 NOTE — ED NOTES
RN spoke with Dr Haleigh Deluca regarding pt's BP and pt not being awake enough for medications at this time. Per Dr Haleigh Deluca hold all PO meds and give IV Keppra one time and a 500mL NS fluid bolus with an infusion of NS at 100mL/hr.       Suzanna Ye RN  01/22/20 2071

## 2020-01-22 NOTE — PROGRESS NOTES
Spoke with nurse from [facility] regarding pt admission. RN states that pt's flushed and pink appearance began following an injection of IM Rocephin given for leukocytosis; noted that they have record of a recent hospital admission related to herpes encephalopathy, and that pt has hx of malignant neoplasm in 2012.

## 2020-01-22 NOTE — CARE COORDINATION
CASE MANAGEMENT NOTE:    Admission Date:  1/21/2020 Jeremias Alberts is a 66 y.o.  female    Admitted for : Acute kidney injury superimposed on CKD ([facility] [N17.9, N18.9]    Met with:  Patient    PCP:  Rema Barnett                                Insurance:  MEDICARE/MEDICAID      Current Residence/ Living Arrangements:  in nursing home             Current Services PTA:  No    Is patient agreeable to VNS: NA    Freedom of choice provided: Yes    List of [address] provided: NA    VNS chosen:  NA    DME:  none    Home Oxygen: Yes 3 L/MIN nasal cannula     Nebulizer: No    CPAP/BIPAP: No    Supplier: N/A    Potential Assistance Needed: Yes    SNF needed: Yes  Came from [facility]:  NA         Is the Patient an memory lane syndications with Readmission Risk Score greater than 14%? No  If yes, pt needs a follow up appointment made within 7 days. Does Patient want to use MEDS to BEDS? No    Family Members/Caregivers that pt would like involved in their care:    Yes    If yes, list name here:  [facility]    Transportation Provider:  [facility] or Ambulance              Is patient in Isolation/One on One/Altered Mental Status? Yes  If yes, skip next question. If no, would they like an I-Pad to  use? No  If yes, call [phone]. Discharge Plan:  01/22/2020 Lives at [facility] wears oxygen 3 l/min DME oxygen Plan return to [facility] with current DME.  Acute Kidney Infarct/ Confusion  /mk              Electronically signed by: Tay Bruno RN on 1/22/2020 at 3:15 PM

## 2020-01-23 PROBLEM — E44.0 MODERATE MALNUTRITION (HCC): Status: ACTIVE | Noted: 2020-01-23

## 2020-01-23 LAB
ALBUMIN SERPL-MCNC: 2.6 G/DL (ref 3.5–5.2)
ALBUMIN/GLOBULIN RATIO: ABNORMAL (ref 1–2.5)
ALP BLD-CCNC: 93 U/L (ref 35–104)
ALT SERPL-CCNC: <5 U/L (ref 5–33)
ANION GAP SERPL CALCULATED.3IONS-SCNC: 12 MMOL/L (ref 9–17)
AST SERPL-CCNC: 12 U/L
BILIRUB SERPL-MCNC: 0.18 MG/DL (ref 0.3–1.2)
BUN BLDV-MCNC: 65 MG/DL (ref 8–23)
BUN/CREAT BLD: ABNORMAL (ref 9–20)
CALCIUM SERPL-MCNC: 9 MG/DL (ref 8.6–10.4)
CHLORIDE BLD-SCNC: 110 MMOL/L (ref 98–107)
CO2: 22 MMOL/L (ref 20–31)
CREAT SERPL-MCNC: 1.74 MG/DL (ref 0.5–0.9)
CULTURE: NO GROWTH
GFR AFRICAN AMERICAN: 34 ML/MIN
GFR NON-AFRICAN AMERICAN: 28 ML/MIN
GFR SERPL CREATININE-BSD FRML MDRD: ABNORMAL ML/MIN/{1.73_M2}
GFR SERPL CREATININE-BSD FRML MDRD: ABNORMAL ML/MIN/{1.73_M2}
GLUCOSE BLD-MCNC: 104 MG/DL (ref 65–105)
GLUCOSE BLD-MCNC: 105 MG/DL (ref 65–105)
GLUCOSE BLD-MCNC: 115 MG/DL (ref 65–105)
GLUCOSE BLD-MCNC: 120 MG/DL (ref 65–105)
GLUCOSE BLD-MCNC: 93 MG/DL (ref 70–99)
GLUCOSE BLD-MCNC: 98 MG/DL (ref 65–105)
HCT VFR BLD CALC: 31.7 % (ref 36–46)
HEMOGLOBIN: 10.2 G/DL (ref 12–16)
INR BLD: 1
Lab: NORMAL
MAGNESIUM: 2.1 MG/DL (ref 1.6–2.6)
MCH RBC QN AUTO: 29.5 PG (ref 26–34)
MCHC RBC AUTO-ENTMCNC: 32.1 G/DL (ref 31–37)
MCV RBC AUTO: 91.8 FL (ref 80–100)
NRBC AUTOMATED: ABNORMAL PER 100 WBC
PDW BLD-RTO: 13.5 % (ref 11.5–14.9)
PLATELET # BLD: 291 K/UL (ref 150–450)
PMV BLD AUTO: 8.1 FL (ref 6–12)
POTASSIUM SERPL-SCNC: 4.9 MMOL/L (ref 3.7–5.3)
PROTHROMBIN TIME: 13.3 SEC (ref 11.8–14.6)
RBC # BLD: 3.46 M/UL (ref 4–5.2)
SODIUM BLD-SCNC: 144 MMOL/L (ref 135–144)
SPECIMEN DESCRIPTION: NORMAL
TOTAL PROTEIN: 5.7 G/DL (ref 6.4–8.3)
WBC # BLD: 8.7 K/UL (ref 3.5–11)

## 2020-01-23 PROCEDURE — 6370000000 HC RX 637 (ALT 250 FOR IP): Performed by: PSYCHIATRY & NEUROLOGY

## 2020-01-23 PROCEDURE — 36415 COLL VENOUS BLD VENIPUNCTURE: CPT

## 2020-01-23 PROCEDURE — 6360000002 HC RX W HCPCS: Performed by: NURSE PRACTITIONER

## 2020-01-23 PROCEDURE — 6370000000 HC RX 637 (ALT 250 FOR IP): Performed by: NURSE PRACTITIONER

## 2020-01-23 PROCEDURE — 83735 ASSAY OF MAGNESIUM: CPT

## 2020-01-23 PROCEDURE — 2060000000 HC ICU INTERMEDIATE R&B

## 2020-01-23 PROCEDURE — 87493 C DIFF AMPLIFIED PROBE: CPT

## 2020-01-23 PROCEDURE — 2580000003 HC RX 258: Performed by: NURSE PRACTITIONER

## 2020-01-23 PROCEDURE — 82947 ASSAY GLUCOSE BLOOD QUANT: CPT

## 2020-01-23 PROCEDURE — 99233 SBSQ HOSP IP/OBS HIGH 50: CPT | Performed by: INTERNAL MEDICINE

## 2020-01-23 PROCEDURE — 2580000003 HC RX 258: Performed by: INTERNAL MEDICINE

## 2020-01-23 PROCEDURE — 85610 PROTHROMBIN TIME: CPT

## 2020-01-23 PROCEDURE — 85027 COMPLETE CBC AUTOMATED: CPT

## 2020-01-23 PROCEDURE — 80053 COMPREHEN METABOLIC PANEL: CPT

## 2020-01-23 PROCEDURE — 99232 SBSQ HOSP IP/OBS MODERATE 35: CPT | Performed by: PSYCHIATRY & NEUROLOGY

## 2020-01-23 RX ORDER — QUETIAPINE FUMARATE 50 MG/1
25 TABLET, FILM COATED ORAL NIGHTLY
Status: DISCONTINUED | OUTPATIENT
Start: 2020-01-23 | End: 2020-01-26 | Stop reason: HOSPADM

## 2020-01-23 RX ADMIN — POLYVINYL ALCOHOL 1 DROP: 14 SOLUTION/ DROPS OPHTHALMIC at 10:42

## 2020-01-23 RX ADMIN — POLYVINYL ALCOHOL 1 DROP: 14 SOLUTION/ DROPS OPHTHALMIC at 22:20

## 2020-01-23 RX ADMIN — HEPARIN SODIUM 5000 UNITS: 5000 INJECTION INTRAVENOUS; SUBCUTANEOUS at 22:18

## 2020-01-23 RX ADMIN — LEVETIRACETAM 500 MG: 500 TABLET ORAL at 10:39

## 2020-01-23 RX ADMIN — QUETIAPINE FUMARATE 25 MG: 50 TABLET ORAL at 22:19

## 2020-01-23 RX ADMIN — METOPROLOL TARTRATE 50 MG: 50 TABLET, FILM COATED ORAL at 22:18

## 2020-01-23 RX ADMIN — POTASSIUM CHLORIDE 10 MEQ: 10 CAPSULE, COATED, EXTENDED RELEASE ORAL at 10:39

## 2020-01-23 RX ADMIN — ATORVASTATIN CALCIUM 20 MG: 20 TABLET, FILM COATED ORAL at 10:39

## 2020-01-23 RX ADMIN — Medication 10 ML: at 22:21

## 2020-01-23 RX ADMIN — POTASSIUM CHLORIDE 10 MEQ: 10 CAPSULE, COATED, EXTENDED RELEASE ORAL at 22:19

## 2020-01-23 RX ADMIN — CITALOPRAM HYDROBROMIDE 20 MG: 20 TABLET ORAL at 10:39

## 2020-01-23 RX ADMIN — GUAIFENESIN 1200 MG: 600 TABLET, EXTENDED RELEASE ORAL at 22:19

## 2020-01-23 RX ADMIN — HEPARIN SODIUM 5000 UNITS: 5000 INJECTION INTRAVENOUS; SUBCUTANEOUS at 05:46

## 2020-01-23 RX ADMIN — MULTIPLE VITAMINS W/ MINERALS TAB 1 TABLET: TAB at 10:39

## 2020-01-23 RX ADMIN — LEVOTHYROXINE SODIUM 100 MCG: 100 TABLET ORAL at 06:38

## 2020-01-23 RX ADMIN — Medication 1 TABLET: at 10:39

## 2020-01-23 RX ADMIN — LEVETIRACETAM 500 MG: 500 TABLET ORAL at 22:18

## 2020-01-23 RX ADMIN — METOPROLOL TARTRATE 50 MG: 50 TABLET, FILM COATED ORAL at 10:39

## 2020-01-23 RX ADMIN — SODIUM CHLORIDE: 9 INJECTION, SOLUTION INTRAVENOUS at 05:57

## 2020-01-23 RX ADMIN — GUAIFENESIN 1200 MG: 600 TABLET, EXTENDED RELEASE ORAL at 10:39

## 2020-01-23 RX ADMIN — FLUTICASONE PROPIONATE 2 SPRAY: 50 SPRAY, METERED NASAL at 10:42

## 2020-01-23 ASSESSMENT — PAIN SCALES - GENERAL
PAINLEVEL_OUTOF10: 0
PAINLEVEL_OUTOF10: 0

## 2020-01-23 NOTE — PROGRESS NOTES
Pt keeps asking for/worrying about . Per [employer] staff, pt and  used to live together at facility but that  passed away quite some years ago. Staff also states that pt is normally completely alert and oriented with no confusion. Today pt is not oriented to self, time, place, or situation.

## 2020-01-23 NOTE — PROGRESS NOTES
Nutrition Assessment    Type and Reason for Visit: Positive Nutrition Screen(Pressure ulcer or Non-healing wounds)    Nutrition Recommendations: Continue Renal diet, Carbohydrate Control diet, 4 carbs/meal. Start Glucerna 1x/day. Nutrition Assessment: Patient nutritionallly compromised as evidence by stage 2 pressure ulcer. Patient is at risk for further compromise due to incresed nutrirent need for wound healing. Will continue Renal, Carbohydrate Control 4 carb/meal. Start Glucerna 1x/day. Monitor labs and wound healing status. Malnutrition Assessment:  · Malnutrition Status: Meets the criteria for moderate malnutrition  · Context: Acute illness or injury  · Findings of the 6 clinical characteristics of malnutrition (Minimum of 2 out of 6 clinical characteristics is required to make the diagnosis of moderate or severe Protein Calorie Malnutrition based on AND/ASPEN Guidelines):  1. Energy Intake-Greater than 75% of estimated energy requirement, Greater than or equal to 5 days    2. Weight Loss-No significant weight loss,    3. Fat Loss-Mild subcutaneous fat loss, Orbital  4. Muscle Loss-Mild muscle mass loss, Clavicles (pectoralis and deltoids)  5. Fluid Accumulation-No significant fluid accumulation, Extremities  6.  Strength-Not measured    Nutrition Risk Level: Moderate    Nutrient Needs:  · Estimated Daily Total Kcal: 9232-6699 kcal based on 13-15 kcal/kg   · Estimated Daily Protein (g): 75-85 gm  of protein based on 1.5-1.7 gm/kg of ideal weight     Nutrition Diagnosis:   · Problem: Increased nutrient needs  · Etiology: related to Increased demand for energy/nutrients     Signs and symptoms:  as evidenced by Presence of wounds(stage 2 wound on buttocks)    Objective Information:  · Nutrition-Focused Physical Findings: No edema.  Some confusion   · Wound Type: Stage II(buttocks, left)  · Current Nutrition Therapies:  · Oral Diet Orders: Carb Control 4 Carbs/Meal, Renal   · Oral Diet intake: 51-75%  · Oral Nutrition Supplement (ONS) Orders: None  · Anthropometric Measures:  · Ht: 5' 2\" (157.5 cm)   · Current Body Wt: 253 lb (114.8 kg)  · Admission Body Wt: 253 lb (114.8 kg)  · Ideal Body Wt: 110 lb (49.9 kg), % Ideal Body 230%  · BMI Classification: BMI > or equal to 40.0 Obese Class III    Nutrition Interventions:   Continue current diet, Start ONS  Continued Inpatient Monitoring    Nutrition Evaluation:   · Evaluation: Goals set   · Goals: PO intakes are greater than 75% at meals    · Monitoring: Weight, Pertinent Labs, Monitor Bowel Function, Diet Tolerance, Skin Integrity, Wound Healing, I&O, Meal Intake, Supplement Intake      Hayley MCCALLUM, R.D, L.D,  Clinical Dietitian  Cell # [phone]  Office # [phone]

## 2020-01-23 NOTE — PROGRESS NOTES
NEPHROLOGY progress note    Patient :  Shun Lind; 66 y.o. MRN# [id number]  Location:  2092/2092-01  Attending:  Mary Kenny MD  Admit Date:  1/21/2020   Hospital Day: 1      Reason for Consult: LEE on CKD      Chief Complaint:  Confused, change in mental status. History Obtained From: EMR, nursing staff patient is not responding to the questions not opening eyes nonverbal    History of Present Illness: This is a 66 y.o. female  past medical history of coronary artery disease, COPD, type 2 diabetes, cancer of the tonsil, essential hypertension, hypothyroidism, CKD stage 3 [baseline creatinine1.6 to 1.8 mg/dL], who presented to the hospital on 1/22/20 with altered mental status, confusion and lethargy. Patient was sent from [address] [place] [address] after she was found to be confused creasing responsiveness  Patient is admitted to ICU for possible sepsis. UA negative for leukocyte Estrace negative for nitrite, negative for protein no blood WBCs 5-10 RBC 0-10  chest x-ray persistent right upper lobe nodular density    CT head no acute intracranial abnormality, generalized involutional changes and chronic small vessel ischemic disease moderate severe paranasal sinus disease, hypoattenuation along the right paracentral gyrus suggestive of remote stroke    CT chest showed scattered irregular parenchymal opacities most consistent with residual scarring from multifocal consolidative airspace disease/pneumonia  Hydronephrosis. Patient has a history of CKD with baseline creatinine of 1.3 to 1.6 mg/dL on admission serum creatinine was noted to be 3.3 mg/dL and therefore nephrology consultation has been requested.     Subjective/interval history    Patient seen and examined today patient is more awake and alert responsive  Denies any fever chills nausea vomiting  Patient is able to speak and move all 4 limbs, no obvious focal neurological deficit noted kidney function improved serum creatinine improved to 1.7 mg/dL      Past Medical History:        Diagnosis Date    AMF (acute myelofibrosis) (HCC)     Anxiety     ARF (acute renal failure) (HCC)     Arthritis     CAD (coronary artery disease)     Cancer of tonsil (HCC)     Colon polyp     COPD (chronic obstructive pulmonary disease) (HCC)     Dehydration     Depression     Diabetes mellitus type 2, controlled ([facility] [address]     DJD (degenerative joint disease)     Encephalopathy     Examination of participant in clinical trial 01/02/2015    Study completed 02/04/2015    GERD (gastroesophageal reflux disease)     Guaiac positive stools     Herpes     Hyperkalemia     Hyperplastic polyp of stomach     Hypertension     Hypothyroid     Multiple gastric polyps     Obesity     Positive FIT (fecal immunochemical test)     Seizure (HCC)     Tubular adenoma of colon     Unspecified cerebral artery occlusion with cerebral infarction     Unspecified diseases of blood and blood-forming organs     Blood infections       Past Surgical History:        Procedure Laterality Date    APPENDECTOMY      CHOLECYSTECTOMY      COLONOSCOPY  03/20/2017    ONE 6 mm cecal polyp and few diverticula of L colon - path: tubular adenoma    HYSTERECTOMY      NC COLONOSCOPY W/BIOPSY SINGLE/MULTIPLE N/A 3/20/2017    COLONOSCOPY with cold biopsy and photos performed by Jw Sapp MD at [address] [place] [address] EGD TRANSORAL BIOPSY SINGLE/MULTIPLE N/A 3/20/2017    EGD ESOPHAGOGASTRODUODENOSCOPY with cold biopsy and photos performed by Jw Sapp MD at [facility]  03/20/2017    3 gastric polyps (4-6 mm) polyps were erythematous and edematous with superficial hemorrhage.  PATH: hyperplastic polyp       Current Medications:    atorvastatin (LIPITOR) tablet 20 mg, Daily  bisacodyl (DULCOLAX) suppository 10 mg, Daily PRN  calcium carbonate-vitamin D (CALTRATE) 600-400 MG-UNIT per tab 1 tablet, Daily  citalopram (CELEXA) tablet 20 mg, Daily  polyvinyl alcohol (LIQUIFILM TEARS) 1.4 % ophthalmic solution 1 drop, BID  docusate sodium (COLACE) capsule 100 mg, BID PRN  fluticasone (FLONASE) 50 MCG/ACT nasal spray 2 spray, Daily  guaiFENesin (MUCINEX) extended release tablet 1,200 mg, BID  HYDROcodone-acetaminophen (NORCO) 5-325 MG per tablet 1 tablet, Q12H PRN  ipratropium-albuterol (DUONEB) nebulizer solution 3 mL, Q4H PRN  levETIRAcetam (KEPPRA) tablet 500 mg, BID  levothyroxine (SYNTHROID) tablet 100 mcg, Daily  metoprolol tartrate (LOPRESSOR) tablet 50 mg, BID  therapeutic multivitamin-minerals 1 tablet, Daily  mupirocin (BACTROBAN) 2 % ointment, BID  potassium chloride (MICRO-K) extended release capsule 10 mEq, BID  0.9 % sodium chloride infusion, Continuous  sodium chloride flush 0.9 % injection 10 mL, 2 times per day  sodium chloride flush 0.9 % injection 10 mL, PRN  nicotine (NICODERM CQ) 21 MG/24HR 1 patch, Daily PRN  heparin (porcine) injection 5,000 Units, 3 times per day  acetaminophen (TYLENOL) tablet 650 mg, Q4H PRN  insulin lispro (HUMALOG) injection vial 0-12 Units, TID WC  insulin lispro (HUMALOG) injection vial 0-6 Units, Nightly  glucose (GLUTOSE) 40 % oral gel 15 g, PRN  dextrose 50 % IV solution, PRN  glucagon (rDNA) injection 1 mg, PRN  dextrose 5 % solution, PRN  [Held by provider] insulin glargine (LANTUS) injection vial 20 Units, Daily  [Held by provider] insulin glargine (LANTUS) injection vial 5 Units, Nightly  0.9 % sodium chloride infusion, Continuous        Allergies:  Dye [iodides]; Ioxaglate; Vancomycin; Ciprofloxacin; Eggs or egg-derived products;  Mushroom extract complex; and Sulfa antibiotics    Objective:  CURRENT TEMPERATURE:  Temp: 98.1 °F (36.7 °C)  MAXIMUM TEMPERATURE OVER 24HRS:  Temp (24hrs), Av °F (36.7 °C), Min:97.4 °F (36.3 °C), Max:98.7 °F (37.1 °C)    CURRENT RESPIRATORY RATE:  Resp: 16  CURRENT PULSE:  Pulse: 71  CURRENT BLOOD PRESSURE:  BP: (!) 146/67  24HR BLOOD PRESSURE RANGE:  Systolic (80ZLW), GLW:259 , Min:99 , RFE:267   ; Diastolic (77ISL), DPM:91, Min:34, Max:132    24HR INTAKE/OUTPUT:      Intake/Output Summary (Last 24 hours) at 1/23/2020 1507  Last data filed at 1/23/2020 0557  Gross per 24 hour   Intake 1043 ml   Output 1075 ml   Net -32 ml     Patient Vitals for the past 96 hrs (Last 3 readings):   Weight   01/22/20 1845 253 lb 1.4 oz (114.8 kg)   01/22/20 0647 270 lb (122.5 kg)       Physical Exam:  GENERAL APPEARANCE: Awake and alert and responsive not in acute distress  HEAD: normocephalic  EYES: PERRL, EOMI. Not pale, anicteric   NOSE:  No nasal discharge. THROAT:  Oral cavity and pharynx normal. Moist  CARDIAC: Normal S1 and S2. No S3, S4 or murmurs. Rhythm is regular. LUNGS: Clear to auscultation and percussion without rales, rhonchi, wheezing or diminished breath sounds. NECK: Neck supple, non-tender without lymphadenopathy, masses or thyromegaly. JVD-neg  BACK: Examination of the spine reveals normal gait and posture, no spinal deformity, symmetry of spinal muscles, without tenderness, decreased range of motion or muscular spasm  MUSKULOSKELETAL: Adequately aligned spine. No joint erythema or tenderness. EXTREMITIES: No edema. Peripheral pulses intact. NEURO: Patient is not moving her arms and legs not responding to questions      Labs:   CBC:  Recent Labs     01/21/20 2236 01/22/20  0628 01/23/20  0456   WBC 14.2* 13.9* 8.7   RBC 3.87* 3.56* 3.46*   HGB 11.7* 10.4* 10.2*   HCT 35.1* 32.4* 31.7*   MCV 90.5 91.0 91.8   MCH 30.3 29.2 29.5   MCHC 33.5 32.1 32.1   RDW 13.7 13.8 13.5    307 291   MPV 8.0 8.3 8.1      BMP:   Recent Labs     01/21/20 2236 01/22/20  0628 01/23/20  0456    141 144   K 5.1 4.7 4.9   CL 96* 105 110*   CO2 22 21 22   BUN 99* 94* 65*   CREATININE 3.33* 2.87* 1.74*   GLUCOSE 88 95 93   CALCIUM 10.1 9.8 9.0      Phosphorus:  No results for input(s): PHOS in the last 72 hours.   Magnesium:   Recent Labs     01/21/20  2236 01/23/20  0456   MG 2.2 2.1 Albumin:   Recent Labs     01/21/20  2236 01/22/20  0628 01/23/20  0456   LABALBU 3.2* 2.8* 2.6*       IRON:  No results for input(s): IRON in the last 72 hours. Iron Saturation:  Invalid input(s): PERCENTFE  TIBC:  No results for input(s): TIBC in the last 72 hours. FERRITIN:  No results for input(s): FERRITIN in the last 72 hours. SPEP: No results for input(s): SPEP in the last 72 hours. Recent Labs     01/23/20  0456   PROT 5.7*     UPEP: No results for input(s): TPU in the last 72 hours. Urine Sodium:    Recent Labs     01/22/20  1231   DEBBIE 24      Urine Potassium: No results for input(s): KUR in the last 72 hours. Urine Chloride: Invalid input(s): CLU  Urine Ph:  Invalid input(s): PO4U  Urine Osmolarity: No results for input(s): OSMOU in the last 72 hours. Urine Creatinine:    Recent Labs     01/22/20  1231   LABCREA 67.8     Urine Eosinophils: Invalid input(s): EOSU  Urine Protein:  No results for input(s): TPU in the last 72 hours. Urinalysis:    Recent Labs     01/22/20  0015   NITRU NEGATIVE   COLORU YELLOW   PHUR 5.0   WBCUA 5 TO 10   RBCUA 0 TO 2   MUCUS NOT REPORTED   TRICHOMONAS NOT REPORTED   YEAST NOT REPORTED   BACTERIA None   SPECGRAV 1.015   LEUKOCYTESUR NEGATIVE   UROBILINOGEN Normal   BILIRUBINUR Presumptive positive. Unable to confirm due to unavailability of reagent. *   [address] [place] SMALL*   AMORPHOUS NOT REPORTED         Radiology:  Reviewed as available. Assessment:  1. LEE on CKD, nonoliguric most likely secondary to prerenal azotemia, nonoliguric serum creatinine improved with IV fluids   2. acute encephalopathy, likely metabolic vs medication induced  3. Chronic kidney disease stage III  4. History of obstructive sleep apnea  5. History of essential hypertension. 6. History of type 2 diabetes insulin-dependent diabetes mellitus  7.    Leukocytosis, rule out sepsis     1.   2. Will Order serum and urine protein electrophoresis to r/o element of occult dysproteinemia. Thank you for the consultation.       Electronically signed by Herson Motley

## 2020-01-23 NOTE — CARE COORDINATION
BPCI-A Medical Bundle Patient:  Working DR-Renal Failure  Admitting Location: [facility]  Adm Date: 2020  Date of Discharge: ???    Bundle End Date: ???    90-day post-acute outreach and tracking with qualifying DRG.
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
## 2020-01-23 NOTE — PROGRESS NOTES
Pt noted to have very poor intake for breakfast and lunch; one bite of grilled cheese at lunch and only apple juice at breakfast. Also, episodes of liquidy green diarrhea x 4 this shift. Dr. Pastor Alba updated and orders placed.
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
## 2020-01-24 LAB
ALBUMIN SERPL-MCNC: 3.1 G/DL (ref 3.5–5.2)
ALBUMIN/GLOBULIN RATIO: ABNORMAL (ref 1–2.5)
ALP BLD-CCNC: 97 U/L (ref 35–104)
ALT SERPL-CCNC: 6 U/L (ref 5–33)
ANION GAP SERPL CALCULATED.3IONS-SCNC: 12 MMOL/L (ref 9–17)
AST SERPL-CCNC: 16 U/L
BILIRUB SERPL-MCNC: 0.24 MG/DL (ref 0.3–1.2)
BUN BLDV-MCNC: 45 MG/DL (ref 8–23)
BUN/CREAT BLD: ABNORMAL (ref 9–20)
C DIFFICILE TOXINS, PCR: NORMAL
CALCIUM SERPL-MCNC: 9.5 MG/DL (ref 8.6–10.4)
CHLORIDE BLD-SCNC: 113 MMOL/L (ref 98–107)
CO2: 23 MMOL/L (ref 20–31)
CREAT SERPL-MCNC: 1.39 MG/DL (ref 0.5–0.9)
GFR AFRICAN AMERICAN: 45 ML/MIN
GFR NON-AFRICAN AMERICAN: 37 ML/MIN
GFR SERPL CREATININE-BSD FRML MDRD: ABNORMAL ML/MIN/{1.73_M2}
GFR SERPL CREATININE-BSD FRML MDRD: ABNORMAL ML/MIN/{1.73_M2}
GLUCOSE BLD-MCNC: 100 MG/DL (ref 65–105)
GLUCOSE BLD-MCNC: 102 MG/DL (ref 65–105)
GLUCOSE BLD-MCNC: 104 MG/DL (ref 65–105)
GLUCOSE BLD-MCNC: 109 MG/DL (ref 65–105)
GLUCOSE BLD-MCNC: 125 MG/DL (ref 70–99)
HCT VFR BLD CALC: 34.9 % (ref 36–46)
HEMOGLOBIN: 11.5 G/DL (ref 12–16)
MCH RBC QN AUTO: 29.7 PG (ref 26–34)
MCHC RBC AUTO-ENTMCNC: 33.1 G/DL (ref 31–37)
MCV RBC AUTO: 89.8 FL (ref 80–100)
NRBC AUTOMATED: ABNORMAL PER 100 WBC
PDW BLD-RTO: 13.5 % (ref 11.5–14.9)
PLATELET # BLD: 269 K/UL (ref 150–450)
PMV BLD AUTO: 7.9 FL (ref 6–12)
POTASSIUM SERPL-SCNC: 4.5 MMOL/L (ref 3.7–5.3)
RBC # BLD: 3.89 M/UL (ref 4–5.2)
SODIUM BLD-SCNC: 148 MMOL/L (ref 135–144)
SPECIMEN DESCRIPTION: NORMAL
TOTAL PROTEIN: 6.1 G/DL (ref 6.4–8.3)
WBC # BLD: 10.4 K/UL (ref 3.5–11)

## 2020-01-24 PROCEDURE — 85027 COMPLETE CBC AUTOMATED: CPT

## 2020-01-24 PROCEDURE — 97162 PT EVAL MOD COMPLEX 30 MIN: CPT

## 2020-01-24 PROCEDURE — 2580000003 HC RX 258: Performed by: NURSE PRACTITIONER

## 2020-01-24 PROCEDURE — 97110 THERAPEUTIC EXERCISES: CPT

## 2020-01-24 PROCEDURE — 82947 ASSAY GLUCOSE BLOOD QUANT: CPT

## 2020-01-24 PROCEDURE — 36415 COLL VENOUS BLD VENIPUNCTURE: CPT

## 2020-01-24 PROCEDURE — 6370000000 HC RX 637 (ALT 250 FOR IP): Performed by: PSYCHIATRY & NEUROLOGY

## 2020-01-24 PROCEDURE — 2580000003 HC RX 258: Performed by: INTERNAL MEDICINE

## 2020-01-24 PROCEDURE — 6360000002 HC RX W HCPCS: Performed by: NURSE PRACTITIONER

## 2020-01-24 PROCEDURE — 2060000000 HC ICU INTERMEDIATE R&B

## 2020-01-24 PROCEDURE — 99232 SBSQ HOSP IP/OBS MODERATE 35: CPT | Performed by: INTERNAL MEDICINE

## 2020-01-24 PROCEDURE — 6370000000 HC RX 637 (ALT 250 FOR IP): Performed by: NURSE PRACTITIONER

## 2020-01-24 PROCEDURE — 99213 OFFICE O/P EST LOW 20 MIN: CPT

## 2020-01-24 PROCEDURE — 6360000002 HC RX W HCPCS: Performed by: INTERNAL MEDICINE

## 2020-01-24 PROCEDURE — 80053 COMPREHEN METABOLIC PANEL: CPT

## 2020-01-24 RX ORDER — SODIUM CHLORIDE 450 MG/100ML
INJECTION, SOLUTION INTRAVENOUS CONTINUOUS
Status: DISCONTINUED | OUTPATIENT
Start: 2020-01-24 | End: 2020-01-26 | Stop reason: HOSPADM

## 2020-01-24 RX ORDER — ONDANSETRON 2 MG/ML
4 INJECTION INTRAMUSCULAR; INTRAVENOUS EVERY 8 HOURS PRN
Status: DISCONTINUED | OUTPATIENT
Start: 2020-01-24 | End: 2020-01-26 | Stop reason: HOSPADM

## 2020-01-24 RX ADMIN — HEPARIN SODIUM 5000 UNITS: 5000 INJECTION INTRAVENOUS; SUBCUTANEOUS at 14:50

## 2020-01-24 RX ADMIN — ATORVASTATIN CALCIUM 20 MG: 20 TABLET, FILM COATED ORAL at 08:31

## 2020-01-24 RX ADMIN — POTASSIUM CHLORIDE 10 MEQ: 10 CAPSULE, COATED, EXTENDED RELEASE ORAL at 21:28

## 2020-01-24 RX ADMIN — SODIUM CHLORIDE: 4.5 INJECTION, SOLUTION INTRAVENOUS at 15:02

## 2020-01-24 RX ADMIN — HEPARIN SODIUM 5000 UNITS: 5000 INJECTION INTRAVENOUS; SUBCUTANEOUS at 06:02

## 2020-01-24 RX ADMIN — LEVOTHYROXINE SODIUM 100 MCG: 100 TABLET ORAL at 06:02

## 2020-01-24 RX ADMIN — METOPROLOL TARTRATE 50 MG: 50 TABLET, FILM COATED ORAL at 21:28

## 2020-01-24 RX ADMIN — LEVETIRACETAM 500 MG: 500 TABLET ORAL at 08:31

## 2020-01-24 RX ADMIN — ONDANSETRON 4 MG: 2 INJECTION INTRAMUSCULAR; INTRAVENOUS at 04:24

## 2020-01-24 RX ADMIN — MUPIROCIN: 20 OINTMENT TOPICAL at 21:33

## 2020-01-24 RX ADMIN — GUAIFENESIN 1200 MG: 600 TABLET, EXTENDED RELEASE ORAL at 21:28

## 2020-01-24 RX ADMIN — POTASSIUM CHLORIDE 10 MEQ: 10 CAPSULE, COATED, EXTENDED RELEASE ORAL at 08:31

## 2020-01-24 RX ADMIN — METOPROLOL TARTRATE 50 MG: 50 TABLET, FILM COATED ORAL at 08:41

## 2020-01-24 RX ADMIN — POLYVINYL ALCOHOL 1 DROP: 14 SOLUTION/ DROPS OPHTHALMIC at 21:42

## 2020-01-24 RX ADMIN — Medication 10 ML: at 08:46

## 2020-01-24 RX ADMIN — GUAIFENESIN 1200 MG: 600 TABLET, EXTENDED RELEASE ORAL at 08:31

## 2020-01-24 RX ADMIN — Medication 1 TABLET: at 08:31

## 2020-01-24 RX ADMIN — HEPARIN SODIUM 5000 UNITS: 5000 INJECTION INTRAVENOUS; SUBCUTANEOUS at 21:28

## 2020-01-24 RX ADMIN — MULTIPLE VITAMINS W/ MINERALS TAB 1 TABLET: TAB at 08:31

## 2020-01-24 RX ADMIN — LEVETIRACETAM 500 MG: 500 TABLET ORAL at 21:28

## 2020-01-24 RX ADMIN — CITALOPRAM HYDROBROMIDE 20 MG: 20 TABLET ORAL at 08:31

## 2020-01-24 RX ADMIN — QUETIAPINE FUMARATE 25 MG: 50 TABLET ORAL at 21:28

## 2020-01-24 NOTE — PLAN OF CARE
Problem: Safety:  Goal: Free from accidental physical injury  Description  Free from accidental physical injury  Outcome: Ongoing  Note:   Pt assessed as a fall risk this shift. Remains free from falls and accidental injury at this time. Fall precautions in place, including falling star sign and fall risk band on pt. Floor free from obstacles, and bed is locked and in lowest position. Adequate lighting provided. Pt encouraged to call before getting OOB for any need. Bed alarm activated. Will continue to monitor needs during hourly rounding, and reinforce education on use of call light. Problem: Daily Care:  Goal: Daily care needs are met  Description  Daily care needs are met  Outcome: Ongoing  Note:   Patient and staff currently meeting all patient's daily care needs. Patient encouraged to participate and complete all ADLs per self. Will continue to monitor for opportunities for patient to meet all ADLs per self. Problem: Pain:  Goal: Patient's pain/discomfort is manageable  Description  Patient's pain/discomfort is manageable  Outcome: Ongoing  Note:   No pain at this time. 0/10 pain scale. Problem: Skin Integrity:  Goal: Skin integrity will stabilize  Description  Skin integrity will stabilize  Outcome: Ongoing  Note:   Skin assessment complete. Turned and repositioned every two hours refused by pt. Area kept free from moisture. Proper nourishment and fluids encouraged, as appropriate. Will continue to monitor for additional needs and changes in skin breakdown.

## 2020-01-24 NOTE — PROGRESS NOTES
I stopped by pt room as she was yelling out nonstop. She is acutely confused and asking for her mother and father. She calms down after I quietly talk to her about common every day things and reassure her. I stay and restart her IV. I spent approximately 25-30 minutes with pt and she was sleeping when I left the room. The HUB will attempt to get a guard to stay the night with her for reassurance as the nurse and I feel she may sleep better with someone in the room. She did not sleep last night and has been reportedly restless all day.

## 2020-01-24 NOTE — PROGRESS NOTES
NEPHROLOGY progress note    Patient :  Ida Drummond; 66 y.o. MRN# [id number]  Location:  2092/2092-01  Attending:  Ko Linda MD  Admit Date:  1/21/2020   Hospital Day: 2      Reason for Consult: LEE on CKD      Chief Complaint:  Confused, change in mental status. History Obtained From: EMR, nursing staff patient is not responding to the questions not opening eyes nonverbal    History of Present Illness: This is a 66 y.o. female  past medical history of coronary artery disease, COPD, type 2 diabetes, cancer of the tonsil, essential hypertension, hypothyroidism, CKD stage 3 [baseline creatinine1.6 to 1.8 mg/dL], who presented to the hospital on 1/22/20 with altered mental status, confusion and lethargy. Patient was sent from [address] [place] [address] after she was found to be confused with decreased responsiveness. Patient is admitted to ICU for possible sepsis. UA negative for leukocyte Estrace negative for nitrite, negative for protein no blood WBCs 5-10 RBC 0-10  chest x-ray persistent right upper lobe nodular densityCT head no acute intracranial abnormality, generalized involutional changes and chronic small vessel ischemic disease moderate severe paranasal sinus disease, hypoattenuation along the right paracentral gyrus suggestive of remote stroke. CT chest showed scattered irregular parenchymal opacities most consistent with residual scarring from multifocal consolidative airspace disease/pneumonia. Patient has a history of CKD with baseline creatinine of 1.3 to 1.6 mg/dL on admission serum creatinine was noted to be 3.3 mg/dL and therefore nephrology consultation has been requested.     Subjective/interval history:  Patient seen and examined today patient is more awake and alert responsive  Denies any fever chills nausea vomiting  Patient is able to speak and move all 4 limbs, no obvious focal neurological deficit noted kidney function improved serum creatinine improved to 1.3 mg/dL      Current Medications:  144 148*   K 4.7 4.9 4.5    110* 113*   CO2 21 22 23   BUN 94* 65* 45*   CREATININE 2.87* 1.74* 1.39*   GLUCOSE 95 93 125*   CALCIUM 9.8 9.0 9.5      Phosphorus:  No results for input(s): PHOS in the last 72 hours. Magnesium:   Recent Labs     01/21/20  2236 01/23/20  0456   MG 2.2 2.1     Albumin:   Recent Labs     01/22/20  0628 01/23/20  0456 01/24/20  0519   LABALBU 2.8* 2.6* 3.1*       IRON:  No results for input(s): IRON in the last 72 hours. Iron Saturation:  Invalid input(s): PERCENTFE  TIBC:  No results for input(s): TIBC in the last 72 hours. FERRITIN:  No results for input(s): FERRITIN in the last 72 hours. SPEP: No results for input(s): SPEP in the last 72 hours. Recent Labs     01/24/20  0519   PROT 6.1*     UPEP: No results for input(s): TPU in the last 72 hours. Urine Sodium:    Recent Labs     01/22/20  1231   DEBBIE 24      Urine Potassium: No results for input(s): KUR in the last 72 hours. Urine Chloride: Invalid input(s): CLU  Urine Ph:  Invalid input(s): PO4U  Urine Osmolarity: No results for input(s): OSMOU in the last 72 hours. Urine Creatinine:    Recent Labs     01/22/20  1231   LABCREA 67.8     Urine Eosinophils: Invalid input(s): EOSU  Urine Protein:  No results for input(s): TPU in the last 72 hours. Urinalysis:    Recent Labs     01/22/20  0015   NITRU NEGATIVE   COLORU YELLOW   PHUR 5.0   WBCUA 5 TO 10   RBCUA 0 TO 2   MUCUS NOT REPORTED   TRICHOMONAS NOT REPORTED   YEAST NOT REPORTED   BACTERIA None   SPECGRAV 1.015   LEUKOCYTESUR NEGATIVE   UROBILINOGEN Normal   BILIRUBINUR Presumptive positive. Unable to confirm due to unavailability of reagent. *   [address] [place] SMALL*   AMORPHOUS NOT REPORTED         Radiology:  Reviewed as available. Assessment:  1.   Acute kidney injury on CKD, nonoliguric most likely secondary to prerenal azotemia, non-oliguric serum creatinine improving  with IV fluids   Plan  Change IVFluids to 0.45 saline at 75 cc/hr  Renal USS.  2. Acute encephalopathy, likely metabolic vs medication induced-IMPROVING   3. Chronic kidney disease stage III  4. History of obstructive sleep apnea  5. History of essential hypertension. 6. History of type 2 diabetes insulin-dependent diabetes mellitus  7. Leukocytosis, rule out sepsis   8. Hypernatremia-stat 0.45 saline at 75 cc/hr    Thank you for the consultation.       Electronically signed by [facility]

## 2020-01-24 NOTE — CARE COORDINATION
DISCHARGE PLANNING NOTE:    Plan is for this patient to return to [facility]. No precert required. LSW is following. Still having confusion. EEG done. Cr. 1.39 today. Will continue to follow along.     Electronically signed by Colin Lam RN on 1/24/2020 at 1:08 PM

## 2020-01-24 NOTE — PROGRESS NOTES
Talked to Dr. Angelica Alcantara for Aliya Zabala RN regarding pt stating that she feel like she could be sick. QT interval discussed from EKG on 1/22. Order received for 4mg IV Zofran Q8 prn.

## 2020-01-24 NOTE — PROGRESS NOTES
Spoke with Nurse Miguel Ángel Stapleton At bedside about EEG . .. She indicates  now would not be good time due to pt's behavior. Explained I attempted EEG yesterday and pt refused to cooperate . Asked that nursing call us when patient is more agreeable.

## 2020-01-24 NOTE — PROGRESS NOTES
[place] [address] [facility]    Progress Note    1/23/2020        Name:   Tori Dumont  MRN:     [id number]     Acct:      [de-identified]   Room:   2092/2092-01  IP Day:  1  Admit Date:  1/21/2020 10:10 PM    PCP:   Drexel Fothergill, MD  Code Status:  Full Code    Subjective:       Subjective:       1/23/2020     Chief Complaint   Patient presents with    Other     possible alter mental status; possible UTI   More agitated and confused and is in delirium        <principal problem not specified>   Active Problems:    Diabetes ([address]    Hypertension    BMI 50.0-59.9, adult (HCC)    Depression    ANDREINA (obstructive sleep apnea)    Obesity hypoventilation syndrome ([address]    Acute kidney injury superimposed on CKD ([address]    Delirium due to general medical condition    Metabolic encephalopathy    Moderate malnutrition ([address]  Resolved Problems:    * No resolved hospital problems. *       Patient examined and chart reviewed . history obtained from patient with nursing input ,  BRIEF HISTORY ;      1/23/2020    · Dr. Gauri Hernandez was consulted   Active problem Metabolic encephalopathy . Acute renal failure . Rule out infection . The condition is she is confused restless not oriented this evening lifting all limbs equally  . Nursing report that she is not sleeping at night . Patient has morbid obesity resident if ECF being on IM rocephin for elevated WBC possible UTI despite negative UA . She became in ECF more somnolent confused less responsive brought to [facility] ER . Head CT with bilateral chronic periventricular small vessel ischemia . There is nonfocal motor, sensory or bulbar complaints . She was found to have renal failure BUN /Ceatinine 3.33/94 respectively with postassium 5 . CT chest scattered irregular opacities consistent with residual scarring from prior mutifocal consolidated air space/ pneumonia . UA negative. WBC 14.2k. She has seizure disorder on keppra 500 mg po bid . Significant medications keppra 500 mg po bid , aspirin 81 mg po qd , lipitor 20 mg po qd  . Testing CT chest scattered irregular opacities consistent with residual scarring from prior mutifocal consolidated air space/ pneumonia. Head CT with bilateral chronic periventricular small vessel ischemia . Consultation  Reviewed ,  .    [] Cardiology           [x] Nephrology  []. Hemo onco    [] GI    [] ID      [] Pulmonary      [] Neuro                                  [] ---         Following input noted ; 1.    LEE on CKD, nonoliguric most likely secondary to prerenal azotemia, nonoliguric serum creatinine improved with IV fluids   2. acute encephalopathy, likely metabolic vs medication induced  3. Chronic kidney disease stage III  4. History of obstructive sleep apnea  5. History of essential hypertension. 6. History of type 2 diabetes insulin-dependent diabetes mellitus  7. Leukocytosis, rule out sepsis                            HX from admission ; The patient is a 66 y.o.  female, with a history of morbid obesity, CKD, COPD, DM type 2, and obesity hypoventilation syndrome, who presents from [facility] for altered mental status with possible UTI. At time of exam, patient is somnolent, confused, and unable to provide any input into HPI. According to ED, patient's ECF reports that patient is normally alert, oriented, and conversant. She is currently being treated with IM Rocephin for elevated white blood count, though urine reportedly tested negative for UTI. Symptoms are associated with fatigue, and generalized pain. Denies fever, chills, cough, nausea, vomiting, diarrhea, and urinary symptoms. There are no aggravating or alleviating factors. Symptoms are reported as constant and moderate.                    Significant last 24 hr data reviewed ;   Vitals:    01/23/20 0019 01/23/20 0734 01/23/20 1359 01/23/20 2029   BP: (!) 135/51 (!) 147/58 (!) 146/67 (!) 176/77   Pulse: 72 96 71 87 Resp: 16 16 16 17   Temp: 97.5 °F (36.4 °C) 97.4 °F (36.3 °C) 98.1 °F (36.7 °C) 97.4 °F (36.3 °C)   TempSrc: Oral Oral Oral Oral   SpO2: 100% 98% 95% 96%   Weight:       Height:             Recent Results (from the past 24 hour(s))   POC Glucose Fingerstick    Collection Time: 01/23/20  2:33 AM   Result Value Ref Range    POC Glucose 98 65 - 105 mg/dL   Comprehensive Metabolic Panel w/ Reflex to MG    Collection Time: 01/23/20  4:56 AM   Result Value Ref Range    Glucose 93 70 - 99 mg/dL    BUN 65 (H) 8 - 23 mg/dL    CREATININE 1.74 (H) 0.50 - 0.90 mg/dL    Bun/Cre Ratio NOT REPORTED 9 - 20    Calcium 9.0 8.6 - 10.4 mg/dL    Sodium 144 135 - 144 mmol/L    Potassium 4.9 3.7 - 5.3 mmol/L    Chloride 110 (H) 98 - 107 mmol/L    CO2 22 20 - 31 mmol/L    Anion Gap 12 9 - 17 mmol/L    Alkaline Phosphatase 93 35 - 104 U/L    ALT <5 (L) 5 - 33 U/L    AST 12 <32 U/L    Total Bilirubin 0.18 (L) 0.3 - 1.2 mg/dL    Total Protein 5.7 (L) 6.4 - 8.3 g/dL    Alb 2.6 (L) 3.5 - 5.2 g/dL    Albumin/Globulin Ratio NOT REPORTED 1.0 - 2.5    GFR Non-African American 28 (L) >60 mL/min    GFR  34 (L) >60 mL/min    GFR Comment          GFR Staging NOT REPORTED    Magnesium    Collection Time: 01/23/20  4:56 AM   Result Value Ref Range    Magnesium 2.1 1.6 - 2.6 mg/dL   CBC    Collection Time: 01/23/20  4:56 AM   Result Value Ref Range    WBC 8.7 3.5 - 11.0 k/uL    RBC 3.46 (L) 4.0 - 5.2 m/uL    Hemoglobin 10.2 (L) 12.0 - 16.0 g/dL    Hematocrit 31.7 (L) 36 - 46 %    MCV 91.8 80 - 100 fL    MCH 29.5 26 - 34 pg    MCHC 32.1 31 - 37 g/dL    RDW 13.5 11.5 - 14.9 %    Platelets 288 526 - 082 k/uL    MPV 8.1 6.0 - 12.0 fL    NRBC Automated NOT REPORTED per 100 WBC   Protime-INR    Collection Time: 01/23/20  4:56 AM   Result Value Ref Range    Protime 13.3 11.8 - 14.6 sec    INR 1.0    POC Glucose Fingerstick    Collection Time: 01/23/20  9:30 AM   Result Value Ref Range    POC Glucose 104 65 - 105 mg/dL   POC Glucose Fingerstick mucosal thickening extending into left frontal sinus. SOFT TISSUES/SKULL:  No acute abnormality of the visualized skull or soft tissues. No acute intracranial abnormality. Generalized involutional changes and chronic small vessel ischemic disease. Hypoattenuation along the right precentral gyrus suggestive of remote stroke. Please correlate exam findings. Moderate severe paranasal sinus disease. Xr Chest Portable    Result Date: 1/21/2020  EXAMINATION: ONE XRAY VIEW OF THE CHEST 1/21/2020 11:28 pm COMPARISON: July 3, 2019 HISTORY: ORDERING SYSTEM PROVIDED HISTORY: AMS TECHNOLOGIST PROVIDED HISTORY: AMS Reason for Exam: AMS Acuity: Acute Type of Exam: Initial FINDINGS: Ill-defined right upper and lower lobe nodular densities persist.  Lungs otherwise clear. Heart and mediastinum normal.  Bony thorax intact. Persistent right upper lobe nodular density. Recommend follow-up CT chest with IV contrast to exclude neoplasm. Ct Chest Abdomen Pelvis Wo Contrast    Result Date: 1/22/2020  EXAMINATION: CT OF THE CHEST, ABDOMEN, AND PELVIS WITHOUT CONTRAST 1/22/2020 12:10 am TECHNIQUE: CT of the chest, abdomen and pelvis was performed without the administration of intravenous contrast. Multiplanar reformatted images are provided for review. Dose modulation, iterative reconstruction, and/or weight based adjustment of the mA/kV was utilized to reduce the radiation dose to as low as reasonably achievable.  COMPARISON: Chest x-ray 01/21/2020 CT chest 06/27/2019 HISTORY: ORDERING SYSTEM PROVIDED HISTORY: altered mental status new renal failure unknown etiology TECHNOLOGIST PROVIDED HISTORY: altered mental status new renal failure unknown etiology Reason for Exam: AMS, decreased renal function, unable to obtain accurate patient history due to pt condition Acuity: Unknown Relevant Medical/Surgical History: surgeries to area of interest (per epic) include hysterectomy, appendectony, cholecystectomy FINDINGS: Chest: mEq Oral BID    sodium chloride flush  10 mL Intravenous 2 times per day    heparin (porcine)  5,000 Units Subcutaneous 3 times per day    insulin lispro  0-12 Units Subcutaneous TID WC    insulin lispro  0-6 Units Subcutaneous Nightly    [Held by provider] insulin glargine  20 Units Subcutaneous Daily    [Held by provider] insulin glargine  5 Units Subcutaneous Nightly     Continuous Infusions:    sodium chloride Stopped (01/22/20 0834)    dextrose      sodium chloride 100 mL/hr at 01/23/20 0557     PRN Meds: bisacodyl, docusate sodium, HYDROcodone-acetaminophen, ipratropium-albuterol, sodium chloride flush, nicotine, acetaminophen, glucose, dextrose, glucagon (rDNA), dextrose  Amilcar Dudley MD    Please note that this chart was generated using voice recognition Dragon dictation software. Although every effort was made to ensure the accuracy of this automated transcription, some errors in transcription may have occurred. Amilcar Dudley MD  1/23/2020    [address], [address] [place] [address].    Phone [phone]   Fax: [phone]  Answering Service: [phone] i

## 2020-01-24 NOTE — PLAN OF CARE
Problem: Safety:  Goal: Free from accidental physical injury  Description  Free from accidental physical injury  1/24/2020 1629 by Bettina Busby RN  Outcome: Ongoing  1/24/2020 0324 by Adi Johnson RN  Outcome: Ongoing  Note:   Pt assessed as a fall risk this shift. Remains free from falls and accidental injury at this time. Fall precautions in place, including falling star sign and fall risk band on pt. Floor free from obstacles, and bed is locked and in lowest position. Adequate lighting provided. Pt encouraged to call before getting OOB for any need. Bed alarm activated. Will continue to monitor needs during hourly rounding, and reinforce education on use of call light. Goal: Free from intentional harm  Description  Free from intentional harm  Outcome: Ongoing     Problem: Daily Care:  Goal: Daily care needs are met  Description  Daily care needs are met  1/24/2020 1629 by Bettina Busby RN  Outcome: Ongoing  Note:   All  daily care needs are met at this time. Continue to monitor. 1/24/2020 0324 by Adi Johnson RN  Outcome: Ongoing  Note:   Patient and staff currently meeting all patient's daily care needs. Patient encouraged to participate and complete all ADLs per self. Will continue to monitor for opportunities for patient to meet all ADLs per self. Problem: Pain:  Goal: Patient's pain/discomfort is manageable  Description  Patient's pain/discomfort is manageable  1/24/2020 1629 by Bettina Busby RN  Outcome: Ongoing  Note:   Patient denies any pain at this time. Continue to monitor. 1/24/2020 0324 by Adi Johnson RN  Outcome: Ongoing  Note:   No pain at this time. 0/10 pain scale.     Goal: Pain level will decrease  Description  Pain level will decrease  Outcome: Ongoing  Goal: Control of acute pain  Description  Control of acute pain  Outcome: Ongoing  Goal: Control of chronic pain  Description  Control of chronic pain  Outcome: Ongoing     Problem: Skin Integrity:  Goal: Skin

## 2020-01-24 NOTE — PROGRESS NOTES
[place] [id number] [facility] Medicine    Progress Note    1/24/2020        Name:   Elvia Garza  MRN:     [id number]     Acct:      [de-identified]   Room:   2092/2092-01  IP Day:  2  Admit Date:  1/21/2020 10:10 PM    PCP:   Scar Lawrence MD  Code Status:  Full Code    Subjective:       Subjective:       1/24/2020     Chief Complaint   Patient presents with    Other     possible alter mental status; possible UTI   More agitated and confused and is in delirium        <principal problem not specified>   Active Problems:    Diabetes ([address]    Hypertension    BMI 50.0-59.9, adult (HCC)    Depression    ANDREINA (obstructive sleep apnea)    Obesity hypoventilation syndrome ([address]    Acute kidney injury superimposed on CKD ([address]    Delirium due to general medical condition    Metabolic encephalopathy    Moderate malnutrition ([address]  Resolved Problems:    * No resolved hospital problems. *       Patient examined and chart reviewed . history obtained from patient with nursing input ,  BRIEF HISTORY ;     1/24/20    · Patient is admitted with severe delirium yesterday she looked a little psychotic 1. Today her delirium is improved and she is answering questions appropriately even though she is disoriented and not aware that she is in hospital  2. Patient has a sitter in the room  3.                  · Dr. Timoteo Flores was consulted   Active problem Metabolic encephalopathy . Acute renal failure . Rule out infection . The condition is she is confused restless not oriented this evening lifting all limbs equally  . Nursing report that she is not sleeping at night . Patient has morbid obesity resident if ECF being on IM rocephin for elevated WBC possible UTI despite negative UA . She became in ECF more somnolent confused less responsive brought to [address] [place] [address] ER . Head CT with bilateral chronic periventricular small vessel ischemia . There is nonfocal motor, sensory or bulbar complaints .  She was found to have renal failure BUN /Ceatinine 3.33/94 respectively with postassium 5 . CT chest scattered irregular opacities consistent with residual scarring from prior mutifocal consolidated air space/ pneumonia . UA negative. WBC 14.2k. She has seizure disorder on keppra 500 mg po bid . Significant medications keppra 500 mg po bid , aspirin 81 mg po qd , lipitor 20 mg po qd  . Testing CT chest scattered irregular opacities consistent with residual scarring from prior mutifocal consolidated air space/ pneumonia. Head CT with bilateral chronic periventricular small vessel ischemia . Consultation  Reviewed ,  .    [] Cardiology           [x] Nephrology  []. Hemo onco    [] GI    [] ID      [] Pulmonary      [] Neuro                                  [] ---         Following input noted ; 2. LEE on CKD, nonoliguric most likely secondary to prerenal azotemia, nonoliguric serum creatinine improved with IV fluids   3. acute encephalopathy, likely metabolic vs medication induced  4. Chronic kidney disease stage III  5. History of obstructive sleep apnea  6. History of essential hypertension. 7. History of type 2 diabetes insulin-dependent diabetes mellitus  7. Leukocytosis, rule out sepsis                            HX from admission ; The patient is a 66 y.o.  female, with a history of morbid obesity, CKD, COPD, DM type 2, and obesity hypoventilation syndrome, who presents from [facility] for altered mental status with possible UTI. At time of exam, patient is somnolent, confused, and unable to provide any input into HPI. According to ED, patient's ECF reports that patient is normally alert, oriented, and conversant. She is currently being treated with IM Rocephin for elevated white blood count, though urine reportedly tested negative for UTI. Symptoms are associated with fatigue, and generalized pain. Denies fever, chills, cough, nausea, vomiting, diarrhea, and urinary symptoms.   There are no aggravating or alleviating factors. Symptoms are reported as constant and moderate.                    Significant last 24 hr data reviewed ;   Vitals:    01/24/20 0027 01/24/20 0315 01/24/20 0700 01/24/20 0835   BP: (!) 157/81   (!) 154/79   Pulse: 75   80   Resp: 18   18   Temp: 97.2 °F (36.2 °C)   97.5 °F (36.4 °C)   TempSrc: Axillary   Oral   SpO2: 100% 94%  98%   Weight:   257 lb 15 oz (117 kg)    Height:             Recent Results (from the past 24 hour(s))   POC Glucose Fingerstick    Collection Time: 01/23/20 11:14 AM   Result Value Ref Range    POC Glucose 120 (H) 65 - 105 mg/dL   POC Glucose Fingerstick    Collection Time: 01/23/20  4:58 PM   Result Value Ref Range    POC Glucose 115 (H) 65 - 105 mg/dL   POC Glucose Fingerstick    Collection Time: 01/23/20  7:14 PM   Result Value Ref Range    POC Glucose 105 65 - 105 mg/dL   Comprehensive Metabolic Panel w/ Reflex to MG    Collection Time: 01/24/20  5:19 AM   Result Value Ref Range    Glucose 125 (H) 70 - 99 mg/dL    BUN 45 (H) 8 - 23 mg/dL    CREATININE 1.39 (H) 0.50 - 0.90 mg/dL    Bun/Cre Ratio NOT REPORTED 9 - 20    Calcium 9.5 8.6 - 10.4 mg/dL    Sodium 148 (H) 135 - 144 mmol/L    Potassium 4.5 3.7 - 5.3 mmol/L    Chloride 113 (H) 98 - 107 mmol/L    CO2 23 20 - 31 mmol/L    Anion Gap 12 9 - 17 mmol/L    Alkaline Phosphatase 97 35 - 104 U/L    ALT 6 5 - 33 U/L    AST 16 <32 U/L    Total Bilirubin 0.24 (L) 0.3 - 1.2 mg/dL    Total Protein 6.1 (L) 6.4 - 8.3 g/dL    Alb 3.1 (L) 3.5 - 5.2 g/dL    Albumin/Globulin Ratio NOT REPORTED 1.0 - 2.5    GFR Non- 37 (L) >60 mL/min    GFR  45 (L) >60 mL/min    GFR Comment          GFR Staging NOT REPORTED    CBC    Collection Time: 01/24/20  5:19 AM   Result Value Ref Range    WBC 10.4 3.5 - 11.0 k/uL    RBC 3.89 (L) 4.0 - 5.2 m/uL    Hemoglobin 11.5 (L) 12.0 - 16.0 g/dL    Hematocrit 34.9 (L) 36 - 46 %    MCV 89.8 80 - 100 fL    MCH 29.7 26 - 34 pg    MCHC 33.1 31 - 37 g/dL    RDW 13.5 11.5 - 14.9 %    Platelets 576 644 - 006 k/uL    MPV 7.9 6.0 - 12.0 fL    NRBC Automated NOT REPORTED per 100 WBC   POC Glucose Fingerstick    Collection Time: 01/24/20  7:23 AM   Result Value Ref Range    POC Glucose 104 65 - 105 mg/dL            Recent Labs     01/23/20  0930 01/23/20  1114 01/23/20  1658 01/23/20  1914 01/24/20  0723   POCGLU 104 120* 115* 105 104        Ct Head Wo Contrast    Result Date: 1/22/2020  EXAMINATION: CT OF THE HEAD WITHOUT CONTRAST  1/21/2020 11:56 pm TECHNIQUE: CT of the head was performed without the administration of intravenous contrast. Dose modulation, iterative reconstruction, and/or weight based adjustment of the mA/kV was utilized to reduce the radiation dose to as low as reasonably achievable. COMPARISON: MRI brain 12/26/2014 HISTORY: ORDERING SYSTEM PROVIDED HISTORY: Altered mental status TECHNOLOGIST PROVIDED HISTORY: Altered mental status Reason for Exam: AMS Acuity: Unknown FINDINGS: BRAIN/VENTRICLES: There is no acute intracranial hemorrhage, mass effect or acute midline shift. No acute extra-axial fluid collection. Hypoattenuation identified along the cortex of the right precentral gyrus likely related to remote stroke however please correlate exam findings. Otherwise, gray-white differentiation is maintained without evidence of an acute infarct. There is no evidence of hydrocephalus. Moderate periventricular subcortical white matter hypoattenuation suggestive of chronic small vessel ischemic disease. Prominence of extra-axial spaces overlying both frontal lobes likely due to generalized involutional changes as on prior imaging studies. Intracranial vascular calcifications. ORBITS: The visualized portion of the orbits demonstrate no acute abnormality. SINUSES: Moderate to severe ethmoid sinus and sphenoid sinus mucosal thickening. The moderate mucosal thickening extending into left frontal sinus.  SOFT TISSUES/SKULL:  No acute abnormality of the visualized thoracic aortic aneurysm. Calcific coronary artery disease. The heart size is normal.  Calcification in the mitral annulus. Lungs/pleura: There are scattered irregular parenchymal opacities consistent with residual scarring from the multifocal airspace disease evident on 06/27/2019. No acute infiltrate, pneumothorax or pleural effusion. Soft Tissues/Bones: No acute bone or soft tissue abnormality. Abdomen/Pelvis: Organs: The gallbladder is surgically absent. The liver, spleen, pancreas and adrenal glands are grossly normal with the limitations of a noncontrast study. There is unchanged left renal atrophy. There is bilateral renal sinus lipomatosis. GI/Bowel: Unopacified bowel loops are unremarkable. No bowel obstruction. No evidence of appendicitis. Pelvis: Urinary bladder is grossly normal.  The uterus is apparently surgically absent Peritoneum/Retroperitoneum: There is no adenopathy, free air or free fluid. Bones/Soft Tissues: No acute bone or soft tissue abnormality. Obesity. No acute finding in the chest, abdomen or pelvis. Scattered irregular parenchymal opacities are most consistent with residual scarring from the multifocal consolidative airspace disease/pneumonia evident on 06/27/2019. There is unchanged left renal atrophy. No evidence of hydronephrosis. Review of Systems:     Respiratory                            Negative for cough,                                             Negative for shortness of breath . Negative for wheezing ,   Cardiovascular :                       Negative for chest pain,                                             Negative for palpitations . Negative for worsening or new leg edema . Gastrointestinal                      Negative for abdominal pain . Negative for change in bowel habits .    Neurological / musculoskeletal; No new symptoms               ----     Review of Systems                 . Interval History Status:  Patients symptoms and condition ;                            worsened. Objective ;     Physical Exam   Vitals:  BP (!) 154/79   Pulse 80   Temp 97.5 °F (36.4 °C) (Oral)   Resp 18   Ht 5' 2\" (1.575 m)   Wt 257 lb 15 oz (117 kg)   SpO2 98%   BMI 47.18 kg/m²              Temp (24hrs), Av.6 °F (36.4 °C), Min:97.2 °F (36.2 °C), Max:98.1 °F (36.7 °C)     Body mass index is 47.18 kg/m². General Appearance:   Alert , CO-OPERATIVE ,       H E:E N T                             No icterus, no pallor . No ptosis. No gross asymmetry  Or abnormality face            Skin:                             No rash or erythema    Neck:                            No mass , no thyroid enlargement                                           Pulmonary/Chest:        Clear to auscultation bilaterally . No wheezes, rales or rhonchi . Cardiovascular:            Normal rate, regular rhythm,                                          No murmur or  Gallop . Abdomen:                       Soft, non-tender                                           Normal bowels sounds,                                             Extremities:                    No  Edema . Physical Exam            Data:     I/O (24Hr):     Intake/Output Summary (Last 24 hours) at 2020 1048  Last data filed at 2020 0506  Gross per 24 hour   Intake 3018 ml   Output 1150 ml   Net 1868 ml     Vitals:    20 0027 20 0315 20 0700 20 0835   BP: (!) 157/81   (!) 154/79   Pulse: 75   80   Resp: 18   18   Temp: 97.2 °F (36.2 °C)   97.5 °F (36.4 flush  10 mL Intravenous 2 times per day    heparin (porcine)  5,000 Units Subcutaneous 3 times per day    insulin lispro  0-12 Units Subcutaneous TID WC    insulin lispro  0-6 Units Subcutaneous Nightly    [Held by provider] insulin glargine  20 Units Subcutaneous Daily    [Held by provider] insulin glargine  5 Units Subcutaneous Nightly     Continuous Infusions:    sodium chloride Stopped (01/22/20 0834)    dextrose      sodium chloride Stopped (01/24/20 0502)     PRN Meds: ondansetron, bisacodyl, docusate sodium, HYDROcodone-acetaminophen, ipratropium-albuterol, sodium chloride flush, nicotine, acetaminophen, glucose, dextrose, glucagon (rDNA), dextrose  Bryson Zhang MD    Please note that this chart was generated using voice recognition Dragon dictation software. Although every effort was made to ensure the accuracy of this automated transcription, some errors in transcription may have occurred. Bryson Zhang MD  1/24/2020    [address]    Phone [phone]   Fax: [phone]  Answering Service: [phone]   I

## 2020-01-24 NOTE — PROGRESS NOTES
Physical Therapy    Facility/Department: [address] [place] [address] CARE  Initial Assessment    NAME: Henrietta Steele  : 1945  MRN: [id number]    Date of Service: 2020    Discharge Recommendations:  Continue to assess pending progress   PT Equipment Recommendations  Equipment Needed: No    Assessment   Body structures, Functions, Activity limitations: Decreased functional mobility ; Decreased strength;Decreased endurance;Decreased safe awareness  Assessment: continue per POC to maxmize potential  Treatment Diagnosis: impaired mobility  Specific instructions for Next Treatment: 2020 bedside evaluation completed, limited particpation from patient, FALL RISK, nonambulatory, sitter  Prognosis: Poor  Decision Making: Medium Complexity  Exam: ROM, MMT  Clinical Presentation: bedside evaluation completed, limited particpation from patient, FALL RISK, nonambulatory, sitter  PT Education: Goals;PT Role;Plan of Care  Barriers to Learning: cognition  REQUIRES PT FOLLOW UP: Yes  Activity Tolerance  Activity Tolerance: Patient limited by cognitive status       Patient Diagnosis(es): The encounter diagnosis was LEE (acute kidney injury) ([facility] [address] has a past medical history of AMF (acute myelofibrosis) ([address] Anxiety, ARF (acute renal failure) ([address] Arthritis, CAD (coronary artery disease), Cancer of tonsil ([address] Colon polyp, COPD (chronic obstructive pulmonary disease) ([address] Dehydration, Depression, Diabetes mellitus type 2, controlled ([address] DJD (degenerative joint disease), Encephalopathy, Examination of participant in clinical trial, GERD (gastroesophageal reflux disease), Guaiac positive stools, Herpes, Hyperkalemia, Hyperplastic polyp of stomach, Hypertension, Hypothyroid, Multiple gastric polyps, Obesity, Positive FIT (fecal immunochemical test), Seizure ([address] Tubular adenoma of colon, Unspecified cerebral artery occlusion with cerebral infarction, and Unspecified diseases of blood and blood-forming organs.    has a Facility([facility] [place])  Home Layout: One level  Home Access: Level entry  ADL Assistance: Needs assistance(full assist from staff for bed bath and bedpan, full assist for dressing)  Homemaking Assistance: (nursing staff is feeding pt here at [facility])  [facility] Responsibilities: No(staff completes)  Ambulation Assistance: (nonambulatory- states she does not get OOB at [facility])  Transfer Assistance: (denies staff using lyla lift at [facility]- states she is bedridden)  Active : No  Additional Comments: pt is confused and is a poor historian, no family present to provide information; above information taken from patient and PT evaluation dated 6-  Cognition        Objective     Observation/Palpation  Observation: 1:1 supervision, peripheral IV right hand, urethral catheter, MRSA, R/O C-diff    PROM RLE (degrees)  RLE PROM: WFL  AROM RLE (degrees)  RLE AROM: Exceptions  RLE General AROM: except dorsiflexion limited to -5 degrees  R SLR: 0-20  R Hip Flexion 0-125: 0-45  R Hip ABduction 0-45: 0-15  R Hip ADduction 0-10: neutral  R Knee Flexion 0-145: 0-30  R Knee Extension 0: 0  R Ankle Dorsiflexion 0-20: -5   R Ankle Plantar Flexion 0-45: 25  PROM LLE (degrees)  LLE PROM: WFL  LLE General PROM: PROM limited dorsiflexion to -5 degrees  AROM LLE (degrees)  LLE AROM : Exceptions  LLE General AROM: left ankle drop foot  L Hip Flexion 0-125: 0-15 AROM, 0-30 AAROM  L Hip ABduction 0-45: would not attempt  L Hip ADduction 0-10: would not attempt  L Knee Flexion 0-145: would not attempt  L Knee Extension 0: would not attempt  L Ankle Dorsiflexion 0-20: holds foot in plantarflexed position  AROM RUE (degrees)  RUE General AROM: see OT for UE assessment  AROM LUE (degrees)  LUE General AROM: see OT for UE assessment  Strength RLE  Strength RLE: Exception  R Hip Flexion: 2/5  R Hip ABduction: 2-/5  R Hip ADduction: 2-/5  R Knee Flexion: 2-/5  R Knee Extension: 2-/5  R Ankle Dorsiflexion: 2-/5  R Ankle Plantar flexion:

## 2020-01-24 NOTE — PROGRESS NOTES
[address] [address] Continence Nurse  Consult Note       NAME:  Jacklyn Kawasaki  MEDICAL RECORD NUMBER:  [id number]  AGE: 66 y.o.    GENDER: female  : 1945  TODAY'S DATE:  2020    Subjective:     Reason for Wound O[facility] Gals Care and Assessment: \"wounds\"      Jacklyn Kawasaki is a 66 y.o. female referred by:   [x] Physician  [] Nursing  [] Other:       Wound Identification:  Wound Type: moisture associated skin damange  Contributing Factors: decreased mobility, incontinence of stool and incontinence of urine    Wound History: the patient is a poor historian and does not know how long open areas have existed  Current Wound Care Treatment:  Zinc paste    Patient Goal of Care:  [x] Wound Healing  [] Odor Control  [] Palliative Care  [x] Pain Control   [] Other:         PAST MEDICAL HISTORY        Diagnosis Date    [facility] [address]     Anxiety     ARF (acute renal failure) (HCC)     Arthritis     CAD (coronary artery disease)     Cancer of tonsil ([facility] [address]     Colon polyp     COPD (chronic obstructive pulmonary disease) ([facility] [address]     Dehydration     Depression     Diabetes mellitus type 2, controlled ([facility] [address]     DJD (degenerative joint disease)     Encephalopathy     Examination of participant in clinical trial 2015    Study completed 2015    GERD (gastroesophageal reflux disease)     Guaiac positive stools     Herpes     Hyperkalemia     Hyperplastic polyp of stomach     Hypertension     Hypothyroid     Multiple gastric polyps     Obesity     Positive FIT (fecal immunochemical test)     Seizure ([facility] [address]     Tubular adenoma of colon     Unspecified cerebral artery occlusion with cerebral infarction     Unspecified diseases of blood and blood-forming organs     Blood infections       PAST SURGICAL HISTORY    Past Surgical History:   Procedure Laterality Date    APPENDECTOMY      CHOLECYSTECTOMY      COLONOSCOPY  2017    ONE 6 mm cecal polyp and few diverticula of L colon - path: tubular adenoma    HYSTERECTOMY      TX COLONOSCOPY W/BIOPSY SINGLE/MULTIPLE N/A 3/20/2017    COLONOSCOPY with cold biopsy and photos performed by Merry Guidry MD at [address] EGD TRANSORAL BIOPSY SINGLE/MULTIPLE N/A 3/20/2017    EGD ESOPHAGOGASTRODUODENOSCOPY with cold biopsy and photos performed by Merry Guidry MD at [facility]  03/20/2017    3 gastric polyps (4-6 mm) polyps were erythematous and edematous with superficial hemorrhage. PATH: hyperplastic polyp       FAMILY HISTORY    No family history on file.     SOCIAL HISTORY    Social History     Tobacco Use    Smoking status: Never Smoker    Smokeless tobacco: Never Used   Substance Use Topics    Alcohol use: No    Drug use: No       ALLERGIES    Allergies   Allergen Reactions    Dye [Iodides] Rash     MRI/CT dye    Ioxaglate Rash     MRI/CT dye  MRI/CT dye      Vancomycin Rash    Ciprofloxacin     Eggs Or Egg-Derived Products     Mushroom Extract Complex     Sulfa Antibiotics            Objective:      BP (!) 161/77   Pulse 73   Temp 97.8 °F (36.6 °C) (Oral)   Resp 16   Ht 5' 2\" (1.575 m)   Wt 257 lb 15 oz (117 kg)   SpO2 96%   BMI 47.18 kg/m²       LABS    CBC:   Lab Results   Component Value Date    WBC 10.4 01/24/2020    RBC 3.89 01/24/2020    HGB 11.5 01/24/2020     CMP:  Albumin:    Lab Results   Component Value Date    LABALBU 3.1 01/24/2020     PT/INR:    Lab Results   Component Value Date    PROTIME 13.3 01/23/2020    INR 1.0 01/23/2020     HgBA1c:    Lab Results   Component Value Date    LABA1C 6.6 01/01/2015     PTT: No components found for: LABPTT      Assessment:       Trung Risk Score: Trung Scale Score: 12    Patient Active Problem List   Diagnosis Code    Facial weakness R29.810    Syncope R55    Facial droop R29.810    Diabetes (HCC) E11.9    Hypertension I10    Fever R50.9    Leukocytosis D72.829    LEE (acute kidney injury) (HCC) N17.9    CKD (chronic kidney disease) N18.9    Lactic acidosis E87.2    Pneumonia J18.9    Hypotension I95.9    Hyponatremia E87.1    Hyperglycemia R73.9    SIRS (systemic inflammatory response syndrome) (HCC) R65.10    Cerebral artery occlusion with cerebral infarction (HCC) I63.50    GERD (gastroesophageal reflux disease) K21.9    BMI 50.0-59.9, adult (HCC) Z68.43    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    Seizure (HCC) R56.9    Thyroid disease E07.9    Arthritis M19.90    Anxiety F41.9    Coronary atherosclerosis I25.10    Depression F32.9    Hypocalcemia E83.51    Shock (HCC) R57.9    Rash R21    Pneumonia due to organism J18.9    Guaiac positive stools R19.5    Occult blood in stools R19.5    Multiple gastric polyps K31.7    Colon polyp K63.5    Hyperplastic polyp of stomach K31.7    Tubular adenoma of colon D12.6    Pneumonia of both lungs due to Mycoplasma pneumoniae J15.7    ANDREINA (obstructive sleep apnea) G47.33    Obesity hypoventilation syndrome (HCC) E66.2    Dysuria R30.0    Herpes 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    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    Acute kidney injury superimposed on CKD (HCC) N17.9, N18.9    Delirium due to general medical condition Z36    Metabolic encephalopathy K26.45    Moderate malnutrition (HCC) E44.0       Patient Active Problem List   Diagnosis    Facial weakness    Syncope    Facial droop    Diabetes ([facility] [address]    Hypertension    Fever    Leukocytosis    LEE (acute kidney injury) ([facility] [address]    CKD (chronic kidney disease)    Lactic acidosis    Pneumonia    Hypotension    Hyponatremia    Hyperglycemia    SIRS (systemic inflammatory response syndrome) (HCC)    Cerebral artery occlusion with cerebral infarction (HCC)    GERD (gastroesophageal reflux disease)    BMI 50.0-59.9, adult (HCC)    Luetscher's syndrome    Seizure (HCC)    Thyroid disease    Arthritis    Anxiety    Coronary atherosclerosis    Depression    Hypocalcemia    Shock 1/24/2020  3:24 PM   Red%Wound Bed 100 1/24/2020  3:24 PM   Number of days: 0     Intertriginous redness in most skin folds including under breasts, abdominal folds, groins, but worst in right side/back skin fold. Recommend wicking fabric to decrease moisture, does not appear fungal in nature. Buttocks with denudation pattern consistent with incontinence associated skin damage. There are new signs of healing, the patient now has a bradley catheter. Would recommend continuing use of Zinc paste and monitor wound progression. Response to treatment:  Well tolerated by patient. Plan:     Plan of Care: Wound 01/24/20 Back Lateral;Right right mid side-Dressing/Treatment: (P) InterdryAG  Wound 01/22/20 Buttocks Medial;Left;Right-Dressing/Treatment: Moisture barrier    Skin folds: use Interdry sheets, being sure to leave enough fabric outside of fold to allow for moisture wicking to take place. Do not use any lotions, creams, or powders with the Interdry. Buttock denudation: keep clean and dry. Use thin layer Zinc paste twice daily and as needed for incontinent episodes. -Turn every 2 hours    -Float heels of of bed with pillows under   -Routine incontinence care with Alfie barrier cloths and zinc oxide cream. Apply zinc oxide cream BID and prn incontinence. Covidien moisture wicking under pads vs cloth backed briefs    -Static air overlay. Check inflation each shift by sliding hand under the air overlay. Feel for the patient's heaviest/ most dependant body part. Ideally 1/2 to 1\" of air will be between your hand and the patient's body. Add air prn.     Specialty Bed Required : Yes   [] Low Air Loss   [x] Pressure Redistribution  [] Fluid Immersion  [] Bariatric  [] Total Pressure Relief  [] Other:     Current Diet: DIET RENAL; Carb Control: 4 carb choices (60 gms)/meal  Dietary Nutrition Supplements: Diabetic Oral Supplement  Dietician consult:  N/A    Discharge Plan:  Placement for patient upon discharge: skilled nursing   Patient appropriate for Outpatient [address]: No    Patient/Caregiver Teaching:  Level of patientunderstanding able to:     [x] Indicates understanding       [x] Needs reinforcement  [] Unsuccessful      [x] Verbal Understanding  [] Demonstrated understanding       [] No evidence of learning  [] Refused teaching         [] N/A       Electronically signed by Rik Villalta RN on  1/24/2020 at 3:30 PM

## 2020-01-24 NOTE — FLOWSHEET NOTE
Patient stated, \"I'm having a bad day\" a number of times during the visit. Writer offered prayer for pea     01/24/20 1129   Encounter Summary   Services provided to: Patient   Referral/Consult From: Rounding   Complexity of Encounter Moderate   Length of Encounter 15 minutes   Spiritual/Pentecostal   Type Spiritual support   Assessment Approachable   Intervention Active listening;Explored feelings, thoughts, concerns;Prayer;Sustaining presence/ Ministry of presence   Outcome Comfort;Expressed gratitude   ce and comfort.

## 2020-01-25 ENCOUNTER — APPOINTMENT (OUTPATIENT)
Dept: ULTRASOUND IMAGING | Age: 75
DRG: 682 | End: 2020-01-25
Payer: MEDICARE

## 2020-01-25 LAB
ALBUMIN SERPL-MCNC: 2.7 G/DL (ref 3.5–5.2)
ALBUMIN/GLOBULIN RATIO: ABNORMAL (ref 1–2.5)
ALP BLD-CCNC: 84 U/L (ref 35–104)
ALT SERPL-CCNC: 6 U/L (ref 5–33)
ANION GAP SERPL CALCULATED.3IONS-SCNC: 8 MMOL/L (ref 9–17)
AST SERPL-CCNC: 17 U/L
BILIRUB SERPL-MCNC: 0.22 MG/DL (ref 0.3–1.2)
BUN BLDV-MCNC: 32 MG/DL (ref 8–23)
BUN/CREAT BLD: ABNORMAL (ref 9–20)
CALCIUM SERPL-MCNC: 9.2 MG/DL (ref 8.6–10.4)
CHLORIDE BLD-SCNC: 114 MMOL/L (ref 98–107)
CO2: 23 MMOL/L (ref 20–31)
CREAT SERPL-MCNC: 1.2 MG/DL (ref 0.5–0.9)
GFR AFRICAN AMERICAN: 53 ML/MIN
GFR NON-AFRICAN AMERICAN: 44 ML/MIN
GFR SERPL CREATININE-BSD FRML MDRD: ABNORMAL ML/MIN/{1.73_M2}
GFR SERPL CREATININE-BSD FRML MDRD: ABNORMAL ML/MIN/{1.73_M2}
GLUCOSE BLD-MCNC: 100 MG/DL (ref 70–99)
GLUCOSE BLD-MCNC: 110 MG/DL (ref 65–105)
GLUCOSE BLD-MCNC: 112 MG/DL (ref 65–105)
GLUCOSE BLD-MCNC: 117 MG/DL (ref 65–105)
GLUCOSE BLD-MCNC: 89 MG/DL (ref 65–105)
HCT VFR BLD CALC: 32.9 % (ref 36–46)
HEMOGLOBIN: 10.7 G/DL (ref 12–16)
MCH RBC QN AUTO: 29.4 PG (ref 26–34)
MCHC RBC AUTO-ENTMCNC: 32.4 G/DL (ref 31–37)
MCV RBC AUTO: 90.7 FL (ref 80–100)
NRBC AUTOMATED: ABNORMAL PER 100 WBC
PDW BLD-RTO: 14 % (ref 11.5–14.9)
PLATELET # BLD: 235 K/UL (ref 150–450)
PMV BLD AUTO: 7.8 FL (ref 6–12)
POTASSIUM SERPL-SCNC: 4.7 MMOL/L (ref 3.7–5.3)
RBC # BLD: 3.63 M/UL (ref 4–5.2)
SODIUM BLD-SCNC: 145 MMOL/L (ref 135–144)
TOTAL PROTEIN: 5.5 G/DL (ref 6.4–8.3)
WBC # BLD: 11.3 K/UL (ref 3.5–11)

## 2020-01-25 PROCEDURE — 6370000000 HC RX 637 (ALT 250 FOR IP): Performed by: PSYCHIATRY & NEUROLOGY

## 2020-01-25 PROCEDURE — 2500000003 HC RX 250 WO HCPCS: Performed by: INTERNAL MEDICINE

## 2020-01-25 PROCEDURE — 6360000002 HC RX W HCPCS: Performed by: NURSE PRACTITIONER

## 2020-01-25 PROCEDURE — 6360000002 HC RX W HCPCS: Performed by: INTERNAL MEDICINE

## 2020-01-25 PROCEDURE — 2060000000 HC ICU INTERMEDIATE R&B

## 2020-01-25 PROCEDURE — 85027 COMPLETE CBC AUTOMATED: CPT

## 2020-01-25 PROCEDURE — 99233 SBSQ HOSP IP/OBS HIGH 50: CPT | Performed by: INTERNAL MEDICINE

## 2020-01-25 PROCEDURE — 6370000000 HC RX 637 (ALT 250 FOR IP): Performed by: NURSE PRACTITIONER

## 2020-01-25 PROCEDURE — 2580000003 HC RX 258: Performed by: INTERNAL MEDICINE

## 2020-01-25 PROCEDURE — 2580000003 HC RX 258: Performed by: NURSE PRACTITIONER

## 2020-01-25 PROCEDURE — 36415 COLL VENOUS BLD VENIPUNCTURE: CPT

## 2020-01-25 PROCEDURE — 76770 US EXAM ABDO BACK WALL COMP: CPT

## 2020-01-25 PROCEDURE — 80053 COMPREHEN METABOLIC PANEL: CPT

## 2020-01-25 PROCEDURE — 82947 ASSAY GLUCOSE BLOOD QUANT: CPT

## 2020-01-25 PROCEDURE — 99232 SBSQ HOSP IP/OBS MODERATE 35: CPT | Performed by: PSYCHIATRY & NEUROLOGY

## 2020-01-25 RX ORDER — HALOPERIDOL 5 MG/ML
2 INJECTION INTRAMUSCULAR ONCE
Status: COMPLETED | OUTPATIENT
Start: 2020-01-25 | End: 2020-01-25

## 2020-01-25 RX ADMIN — LEVETIRACETAM 500 MG: 500 TABLET ORAL at 22:08

## 2020-01-25 RX ADMIN — CITALOPRAM HYDROBROMIDE 20 MG: 20 TABLET ORAL at 08:32

## 2020-01-25 RX ADMIN — MUPIROCIN: 20 OINTMENT TOPICAL at 21:46

## 2020-01-25 RX ADMIN — METOPROLOL TARTRATE 50 MG: 50 TABLET, FILM COATED ORAL at 22:09

## 2020-01-25 RX ADMIN — QUETIAPINE FUMARATE 25 MG: 50 TABLET ORAL at 22:09

## 2020-01-25 RX ADMIN — Medication 1 TABLET: at 08:32

## 2020-01-25 RX ADMIN — POLYVINYL ALCOHOL 1 DROP: 14 SOLUTION/ DROPS OPHTHALMIC at 08:33

## 2020-01-25 RX ADMIN — LEVOTHYROXINE SODIUM 100 MCG: 100 TABLET ORAL at 06:26

## 2020-01-25 RX ADMIN — Medication 10 ML: at 21:46

## 2020-01-25 RX ADMIN — SODIUM CHLORIDE: 4.5 INJECTION, SOLUTION INTRAVENOUS at 12:35

## 2020-01-25 RX ADMIN — HEPARIN SODIUM 5000 UNITS: 5000 INJECTION INTRAVENOUS; SUBCUTANEOUS at 14:17

## 2020-01-25 RX ADMIN — HEPARIN SODIUM 5000 UNITS: 5000 INJECTION INTRAVENOUS; SUBCUTANEOUS at 22:09

## 2020-01-25 RX ADMIN — ONDANSETRON 4 MG: 2 INJECTION INTRAMUSCULAR; INTRAVENOUS at 02:59

## 2020-01-25 RX ADMIN — DOCUSATE SODIUM 100 MG: 100 CAPSULE, LIQUID FILLED ORAL at 21:46

## 2020-01-25 RX ADMIN — ANTI-FUNGAL POWDER MICONAZOLE NITRATE TALC FREE: 1.42 POWDER TOPICAL at 14:17

## 2020-01-25 RX ADMIN — METOPROLOL TARTRATE 50 MG: 50 TABLET, FILM COATED ORAL at 08:31

## 2020-01-25 RX ADMIN — LEVETIRACETAM 500 MG: 500 TABLET ORAL at 08:31

## 2020-01-25 RX ADMIN — HALOPERIDOL LACTATE 2 MG: 5 INJECTION INTRAMUSCULAR at 23:55

## 2020-01-25 RX ADMIN — POTASSIUM CHLORIDE 10 MEQ: 10 CAPSULE, COATED, EXTENDED RELEASE ORAL at 08:31

## 2020-01-25 RX ADMIN — MULTIPLE VITAMINS W/ MINERALS TAB 1 TABLET: TAB at 08:32

## 2020-01-25 RX ADMIN — POTASSIUM CHLORIDE 10 MEQ: 10 CAPSULE, COATED, EXTENDED RELEASE ORAL at 20:54

## 2020-01-25 RX ADMIN — ANTI-FUNGAL POWDER MICONAZOLE NITRATE TALC FREE: 1.42 POWDER TOPICAL at 21:59

## 2020-01-25 RX ADMIN — HEPARIN SODIUM 5000 UNITS: 5000 INJECTION INTRAVENOUS; SUBCUTANEOUS at 06:26

## 2020-01-25 RX ADMIN — ONDANSETRON 4 MG: 2 INJECTION INTRAMUSCULAR; INTRAVENOUS at 13:03

## 2020-01-25 RX ADMIN — GUAIFENESIN 1200 MG: 600 TABLET, EXTENDED RELEASE ORAL at 08:31

## 2020-01-25 RX ADMIN — ATORVASTATIN CALCIUM 20 MG: 20 TABLET, FILM COATED ORAL at 08:31

## 2020-01-25 ASSESSMENT — PAIN SCALES - GENERAL: PAINLEVEL_OUTOF10: 0

## 2020-01-25 NOTE — PROGRESS NOTES
[place] [address] Internal Medicine    Progress Note    1/25/2020        Name:   Craig Corbin  MRN:     [id number]     Acct:      [de-identified]   Room:   2092/2092-01  IP Day:  3  Admit Date:  1/21/2020 10:10 PM    PCP:   Renzo Reed MD  Code Status:  Full Code    Subjective:       Subjective:       1/25/2020     Chief Complaint   Patient presents with    Other     possible alter mental status; possible UTI   More agitated and confused and is in delirium        <principal problem not specified>   Active Problems:    Diabetes ([address]    Hypertension    BMI 50.0-59.9, adult ([address]    Depression    ANDREINA (obstructive sleep apnea)    Obesity hypoventilation syndrome ([address]    Acute kidney injury superimposed on CKD ([address]    Delirium due to general medical condition    Metabolic encephalopathy    Moderate malnutrition ([address]  Resolved Problems:    * No resolved hospital problems. *       Patient examined and chart reviewed . history obtained from patient with nursing input ,  BRIEF HISTORY ;   1/25/20  Is more oriented today however still lethargic and sleepy  Did complain of some nausea  Appetite poor  Noted abnormal imaging studies including evidence of sinusitis and remote stroke  densityCT head no acute intracranial abnormality, generalized involutional changes and chronic small vessel ischemic disease moderate severe paranasal sinus disease, hypoattenuation along the right paracentral gyrus suggestive of remote stroke. CT chest showed scattered irregular parenchymal opacities most consistent with residual scarring from multifocal consolidative airspace disease/pneumonia. Patient has a history of CKD with baseline creatinine of 1.3 to 1.6 mg/dL on admission serum creatinine was noted to be 3.3 mg/dL and therefore nephrology consultation has been requested.       Neuro input noted  Metabolic encephalopathy . Acute renal failure . Rule out infection .  The condition is she is improved alert conversive eating supper although oriented x1 lifting all limbs equally  . Renal indices have improved BUN/Creatinine 32/1.20 respectively . Patient has morbid obesity resident of [facility] being on IM rocephin for elevated WBC possible UTI despite negative UA . She became in ECF more somnolent confused less responsive brought to [facility] ER . Head CT with bilateral chronic periventricular small vessel ischemia . There is nonfocal motor, sensory or bulbar complaints . She was found to have renal failure BUN /Creatinine 3.33/94 respectively with postassium 5 . CT chest scattered irregular opacities consistent with residual scarring from prior mutifocal consolidated air space/ pneumonia . UA negative. WBC 14.2k. She has seizure disorder on keppra 500 mg po bid . Significant medications seroquel 25 mg po qhs ,  keppra 500 mg po bid , aspirin 81 mg po qd , lipitor 20 mg po qd  . Testing CT chest scattered irregular opacities consistent with residual scarring from prior mutifocal consolidated air space/ pneumonia. Head CT with bilateral chronic periventricular small vessel ischemia . UA negative     1/24/20    · Patient is admitted with severe delirium yesterday she looked a little psychotic 1. Today her delirium is improved and she is answering questions appropriately even though she is disoriented and not aware that she is in hospital  2. Patient has a sitter in the room  3.                  · Dr. Mely Dye was consulted   Active problem Metabolic encephalopathy . Acute renal failure . Rule out infection . The condition is she is confused restless not oriented this evening lifting all limbs equally  . Nursing report that she is not sleeping at night . Patient has morbid obesity resident if [facility] being on IM rocephin for elevated WBC possible UTI despite negative UA . She became in ECF more somnolent confused less responsive brought to [facility] ER .  Head CT with bilateral chronic periventricular small vessel ischemia . There is nonfocal motor, sensory or bulbar complaints . She was found to have renal failure BUN /Ceatinine 3.33/94 respectively with postassium 5 . CT chest scattered irregular opacities consistent with residual scarring from prior mutifocal consolidated air space/ pneumonia . UA negative. WBC 14.2k. She has seizure disorder on keppra 500 mg po bid . Significant medications keppra 500 mg po bid , aspirin 81 mg po qd , lipitor 20 mg po qd  . Testing CT chest scattered irregular opacities consistent with residual scarring from prior mutifocal consolidated air space/ pneumonia. Head CT with bilateral chronic periventricular small vessel ischemia . Consultation  Reviewed ,  .    [] Cardiology           [x] Nephrology  []. Hemo onco    [] GI    [] ID      [] Pulmonary      [] Neuro                                  [] ---         Following input noted ; 2. LEE on CKD, nonoliguric most likely secondary to prerenal azotemia, nonoliguric serum creatinine improved with IV fluids   3. acute encephalopathy, likely metabolic vs medication induced  4. Chronic kidney disease stage III  5. History of obstructive sleep apnea  6. History of essential hypertension. 7. History of type 2 diabetes insulin-dependent diabetes mellitus  7. Leukocytosis, rule out sepsis                            HX from admission ; The patient is a 66 y.o.  female, with a history of morbid obesity, CKD, COPD, DM type 2, and obesity hypoventilation syndrome, who presents from [facility] for altered mental status with possible UTI. At time of exam, patient is somnolent, confused, and unable to provide any input into HPI. According to ED, patient's ECF reports that patient is normally alert, oriented, and conversant. She is currently being treated with IM Rocephin for elevated white blood count, though urine reportedly tested negative for UTI. Symptoms are associated with fatigue, and generalized pain.   Denies fever, chills, cough, nausea, vomiting, diarrhea, and urinary symptoms. There are no aggravating or alleviating factors. Symptoms are reported as constant and moderate.                    Significant last 24 hr data reviewed ;   Vitals:    01/24/20 2323 01/25/20 0245 01/25/20 0710 01/25/20 1353   BP: (!) 135/55 (!) 144/65 (!) 151/63 (!) 163/75   Pulse: 71 75 67 70   Resp: 16 18 16    Temp: 98 °F (36.7 °C) 97.8 °F (36.6 °C) 98.5 °F (36.9 °C) 98.1 °F (36.7 °C)   TempSrc: Oral Axillary Axillary Axillary   SpO2: 97% 95% 97% 96%   Weight:       Height:             Recent Results (from the past 24 hour(s))   POC Glucose Fingerstick    Collection Time: 01/24/20  7:54 PM   Result Value Ref Range    POC Glucose 100 65 - 105 mg/dL   Comprehensive Metabolic Panel w/ Reflex to MG    Collection Time: 01/25/20  5:29 AM   Result Value Ref Range    Glucose 100 (H) 70 - 99 mg/dL    BUN 32 (H) 8 - 23 mg/dL    CREATININE 1.20 (H) 0.50 - 0.90 mg/dL    Bun/Cre Ratio NOT REPORTED 9 - 20    Calcium 9.2 8.6 - 10.4 mg/dL    Sodium 145 (H) 135 - 144 mmol/L    Potassium 4.7 3.7 - 5.3 mmol/L    Chloride 114 (H) 98 - 107 mmol/L    CO2 23 20 - 31 mmol/L    Anion Gap 8 (L) 9 - 17 mmol/L    Alkaline Phosphatase 84 35 - 104 U/L    ALT 6 5 - 33 U/L    AST 17 <32 U/L    Total Bilirubin 0.22 (L) 0.3 - 1.2 mg/dL    Total Protein 5.5 (L) 6.4 - 8.3 g/dL    Alb 2.7 (L) 3.5 - 5.2 g/dL    Albumin/Globulin Ratio NOT REPORTED 1.0 - 2.5    GFR Non- 44 (L) >60 mL/min    GFR  53 (L) >60 mL/min    GFR Comment          GFR Staging NOT REPORTED    CBC    Collection Time: 01/25/20  5:29 AM   Result Value Ref Range    WBC 11.3 (H) 3.5 - 11.0 k/uL    RBC 3.63 (L) 4.0 - 5.2 m/uL    Hemoglobin 10.7 (L) 12.0 - 16.0 g/dL    Hematocrit 32.9 (L) 36 - 46 %    MCV 90.7 80 - 100 fL    MCH 29.4 26 - 34 pg    MCHC 32.4 31 - 37 g/dL    RDW 14.0 11.5 - 14.9 %    Platelets 606 036 - 183 k/uL    MPV 7.8 6.0 - 12.0 fL    NRBC Automated NOT REPORTED per 100 thickening. The moderate mucosal thickening extending into left frontal sinus. SOFT TISSUES/SKULL:  No acute abnormality of the visualized skull or soft tissues. No acute intracranial abnormality. Generalized involutional changes and chronic small vessel ischemic disease. Hypoattenuation along the right precentral gyrus suggestive of remote stroke. Please correlate exam findings. Moderate severe paranasal sinus disease. Xr Chest Portable    Result Date: 1/21/2020  EXAMINATION: ONE XRAY VIEW OF THE CHEST 1/21/2020 11:28 pm COMPARISON: July 3, 2019 HISTORY: ORDERING SYSTEM PROVIDED HISTORY: AMS TECHNOLOGIST PROVIDED HISTORY: AMS Reason for Exam: AMS Acuity: Acute Type of Exam: Initial FINDINGS: Ill-defined right upper and lower lobe nodular densities persist.  Lungs otherwise clear. Heart and mediastinum normal.  Bony thorax intact. Persistent right upper lobe nodular density. Recommend follow-up CT chest with IV contrast to exclude neoplasm. Ct Chest Abdomen Pelvis Wo Contrast    Result Date: 1/22/2020  EXAMINATION: CT OF THE CHEST, ABDOMEN, AND PELVIS WITHOUT CONTRAST 1/22/2020 12:10 am TECHNIQUE: CT of the chest, abdomen and pelvis was performed without the administration of intravenous contrast. Multiplanar reformatted images are provided for review. Dose modulation, iterative reconstruction, and/or weight based adjustment of the mA/kV was utilized to reduce the radiation dose to as low as reasonably achievable.  COMPARISON: Chest x-ray 01/21/2020 CT chest 06/27/2019 HISTORY: ORDERING SYSTEM PROVIDED HISTORY: altered mental status new renal failure unknown etiology TECHNOLOGIST PROVIDED HISTORY: altered mental status new renal failure unknown etiology Reason for Exam: AMS, decreased renal function, unable to obtain accurate patient history due to pt condition Acuity: Unknown Relevant Medical/Surgical History: surgeries to area of interest (per epic) include hysterectomy, appendectony, Oral BID    levothyroxine  100 mcg Oral Daily    metoprolol tartrate  50 mg Oral BID    therapeutic multivitamin-minerals  1 tablet Oral Daily    mupirocin   Topical BID    potassium chloride  10 mEq Oral BID    sodium chloride flush  10 mL Intravenous 2 times per day    heparin (porcine)  5,000 Units Subcutaneous 3 times per day    insulin lispro  0-12 Units Subcutaneous TID WC    insulin lispro  0-6 Units Subcutaneous Nightly    [Held by provider] insulin glargine  20 Units Subcutaneous Daily    [Held by provider] insulin glargine  5 Units Subcutaneous Nightly     Continuous Infusions:    sodium chloride 75 mL/hr at 01/25/20 1235    dextrose       PRN Meds: ondansetron, bisacodyl, docusate sodium, HYDROcodone-acetaminophen, ipratropium-albuterol, sodium chloride flush, nicotine, acetaminophen, glucose, dextrose, glucagon (rDNA), dextrose  Soham Dalal MD    Please note that this chart was generated using voice recognition Dragon dictation software. Although every effort was made to ensure the accuracy of this automated transcription, some errors in transcription may have occurred. Soham Dalal MD  1/25/2020    TOMI TAVAREZ [address]    Phone [phone]   Fax: [phone]  Answering Service: [phone] i

## 2020-01-25 NOTE — FLOWSHEET NOTE
Loyda Gilman (pt) was calling out in pain I got her nurse for her and offered a prayer. Chaplains remain available for spiritual or emotional support as needed. 01/24/20 1917   Encounter Summary   Services provided to: Patient   Referral/Consult From: [facility]   (1/24/2020)   Complexity of Encounter Moderate   Length of Encounter 15 minutes   Routine   Type Initial   Assessment Fearful; Hopeless   Intervention Prayer   Outcome Did not respond

## 2020-01-25 NOTE — PROGRESS NOTES
NEPHROLOGY progress note    Patient :  Sarina Swann; 66 y.o. MRN# [id number]  Location:  2092/2092-01  Attending:  Antonina Mejía MD  Admit Date:  1/21/2020   Hospital Day: 3      Reason for Consult: LEE on CKD      Chief Complaint:  Confused, change in mental status. History Obtained From: EMR, nursing staff patient is not responding to the questions not opening eyes nonverbal    History of Present Illness: This is a 66 y.o. female  past medical history of coronary artery disease, COPD, type 2 diabetes, cancer of the tonsil, essential hypertension, hypothyroidism, CKD stage 3 [baseline creatinine1.6 to 1.8 mg/dL], who presented to the hospital on 1/22/20 with altered mental status, confusion and lethargy. Patient was sent from [address] [place] [address] after she was found to be confused with decreased responsiveness. Patient is admitted to ICU for possible sepsis. UA negative for leukocyte Estrace negative for nitrite, negative for protein no blood WBCs 5-10 RBC 0-10  chest x-ray persistent right upper lobe nodular densityCT head no acute intracranial abnormality, generalized involutional changes and chronic small vessel ischemic disease moderate severe paranasal sinus disease, hypoattenuation along the right paracentral gyrus suggestive of remote stroke. CT chest showed scattered irregular parenchymal opacities most consistent with residual scarring from multifocal consolidative airspace disease/pneumonia. Patient has a history of CKD with baseline creatinine of 1.3 to 1.6 mg/dL on admission serum creatinine was noted to be 3.3 mg/dL and therefore nephrology consultation has been requested. Subjective/interval history:  Patient seen and examined today patient is more awake and alert responsive  Denies any fever chills nausea vomiting.   Still not eating well. serum creatinine improved to 1.2 mg/dL      Current Medications:    miconazole (MICOTIN) 2 % powder, BID  ondansetron (ZOFRAN) injection 4 mg, Q8H PRN  0.45 % °C), Min:97.7 °F (36.5 °C), Max:98.5 °F (36.9 °C)    CURRENT RESPIRATORY RATE:  Resp: 16  CURRENT PULSE:  Pulse: 70  CURRENT BLOOD PRESSURE:  BP: (!) 163/75  24HR BLOOD PRESSURE RANGE:  Systolic (91QIF), NTA:759 , Min:135 , RJP:275   ; Diastolic (28CYU), WWV:86, Min:55, Max:77    24HR INTAKE/OUTPUT:      Intake/Output Summary (Last 24 hours) at 1/25/2020 1428  Last data filed at 1/25/2020 1236  Gross per 24 hour   Intake 611 ml   Output 1050 ml   Net -439 ml     Patient Vitals for the past 96 hrs (Last 3 readings):   Weight   01/24/20 0700 257 lb 15 oz (117 kg)   01/22/20 1845 253 lb 1.4 oz (114.8 kg)   01/22/20 0647 270 lb (122.5 kg)       Physical Exam:  GENERAL APPEARANCE: Awake and alert and responsive not in acute distress  HEAD: normocephalic  EYES: PERRL, EOMI. Not pale, anicteric   NOSE:  No nasal discharge. THROAT:  Oral cavity and pharynx normal. Moist  CARDIAC: Normal S1 and S2. No S3, S4 or murmurs. Rhythm is regular. LUNGS: Clear to auscultation and percussion without rales, rhonchi, wheezing or diminished breath sounds. NECK: Neck supple, non-tender without lymphadenopathy, masses or thyromegaly. JVD-neg  BACK: Examination of the spine reveals normal gait and posture, no spinal deformity, symmetry of spinal muscles, without tenderness, decreased range of motion or muscular spasm  MUSKULOSKELETAL: Adequately aligned spine. No joint erythema or tenderness. EXTREMITIES: No edema. Peripheral pulses intact.    NEURO: Patient is not moving her arms and legs not responding to questions      Labs:   CBC:  Recent Labs     01/23/20  0456 01/24/20  0519 01/25/20  0529   WBC 8.7 10.4 11.3*   RBC 3.46* 3.89* 3.63*   HGB 10.2* 11.5* 10.7*   HCT 31.7* 34.9* 32.9*   MCV 91.8 89.8 90.7   MCH 29.5 29.7 29.4   MCHC 32.1 33.1 32.4   RDW 13.5 13.5 14.0    269 235   MPV 8.1 7.9 7.8      BMP:   Recent Labs     01/23/20  0456 01/24/20  0519 01/25/20  0529    148* 145*   K 4.9 4.5 4.7   * 113* 114* CO2 22 23 23   BUN 65* 45* 32*   CREATININE 1.74* 1.39* 1.20*   GLUCOSE 93 125* 100*   CALCIUM 9.0 9.5 9.2      Phosphorus:  No results for input(s): PHOS in the last 72 hours. Magnesium:   Recent Labs     01/23/20  0456   MG 2.1     Albumin:   Recent Labs     01/23/20  0456 01/24/20  0519 01/25/20  0529   LABALBU 2.6* 3.1* 2.7*       IRON:  No results for input(s): IRON in the last 72 hours. Iron Saturation:  Invalid input(s): PERCENTFE  TIBC:  No results for input(s): TIBC in the last 72 hours. FERRITIN:  No results for input(s): FERRITIN in the last 72 hours. SPEP: No results for input(s): SPEP in the last 72 hours. Recent Labs     01/25/20  0529   PROT 5.5*     UPEP: No results for input(s): TPU in the last 72 hours. Urine Sodium:    No results for input(s): DEBBIE in the last 72 hours. Urine Potassium: No results for input(s): KUR in the last 72 hours. Urine Chloride: Invalid input(s): CLU  Urine Ph:  Invalid input(s): PO4U  Urine Osmolarity: No results for input(s): OSMOU in the last 72 hours. Urine Creatinine:    No results for input(s): LABCREA in the last 72 hours. Urine Eosinophils: Invalid input(s): EOSU  Urine Protein:  No results for input(s): TPU in the last 72 hours. Urinalysis:    No results for input(s): Josphine B[place], PHUR, LABCAST, WBCUA, RBCUA, MUCUS, TRICHOMONAS, YEAST, BACTERIA, CLARITYU, SPECGRAV, LEUKOCYTESUR, UROBILINOGEN, BILIRUBINUR, BLOODU, GLUCOSEU, KETUA, AMORPHOUS in the last 72 hours. Radiology:  Reviewed as available. Assessment:  1. Acute kidney injury on CKD, nonoliguric most likely secondary to prerenal azotemia, non-oliguric serum creatinine improving  with IV fluids   Ultrasound shows no hydronephrosis follow with asymmetric size of left kidney could suggest underlying atrophy    2. Acute encephalopathy, likely metabolic vs medication induced-IMPROVING   3. Chronic kidney disease stage III  4. History of obstructive sleep apnea  5.  History of essential hypertension. 6. History of type 2 diabetes insulin-dependent diabetes mellitus  7. Leukocytosis, rule out sepsis   8. Hypernatremia-    Plan  Continue  IVFluids to 0.45 saline at 75 cc/hr  Strict I's and O's  Encourage oral intake      Thank you for the consultation.       Electronically signed by Anh Calix

## 2020-01-25 NOTE — PROGRESS NOTES
Sleepy now. No longer calling out frequently. Awakened and follows commands. Refused to eat. Only took small sips of pop. Complains of nausea. Zofran given.

## 2020-01-25 NOTE — PLAN OF CARE
Problem: Safety:  Goal: Free from accidental physical injury  Description  Free from accidental physical injury  1/25/2020 0425 by Tu Howell RN  Outcome: Ongoing  Telesitter in place. Hourly rounding. Fall precautions. Goal: Free from intentional harm  Description  Free from intentional harm  1/25/2020 0425 by Tu Howell RN  Outcome: Ongoing     Problem: Daily Care:  Goal: Daily care needs are met  Description  Daily care needs are met  1/25/2020 0425 by Tu Howell RN  Outcome: Ongoing  Pt assisted w/lotion application. Gown changed. Problem: Pain:  Goal: Patient's pain/discomfort is manageable  Description  Patient's pain/discomfort is manageable  1/25/2020 0425 by Tu Howell RN  Outcome: Ongoing  Pt denies pain and need for pain medication. Goal: Pain level will decrease  Description  Pain level will decrease  1/25/2020 0425 by Tu Howell RN  Outcome: Ongoing  Goal: Control of acute pain  Description  Control of acute pain  1/25/2020 0425 by Tu Howell RN  Outcome: Ongoing  Goal: Control of chronic pain  Description  Control of chronic pain  1/25/2020 0425 by Tu Howell RN  Outcome: Ongoing     Problem: Skin Integrity:  Goal: Skin integrity will stabilize  Description  Skin integrity will stabilize  1/25/2020 0425 by Tu Howell RN  Outcome: Ongoing  No new skin breakdown noted this shift. Pt turned and repositioned q2hrs. Skin assessed and maintained w/repositioning and PRN. Lotion applied. Feet elevated. Waffle mattress in place. Problem: Discharge Planning:  Goal: Patients continuum of care needs are met  Description  Patients continuum of care needs are met  1/25/2020 0425 by Tu Howell RN  Outcome: Ongoing  Case management following for discharge planning.       Problem: Risk for Impaired Skin Integrity  Goal: Tissue integrity - skin and mucous membranes  Description  Structural intactness and normal physiological function of skin and  mucous membranes. 1/25/2020 0425 by Bret Jones RN  Outcome: Ongoing     Problem: Falls - Risk of:  Goal: Will remain free from falls  Description  Will remain free from falls  1/25/2020 0425 by Bret Jones RN  Outcome: Ongoing  Pt remains free of falls this shift. Side rails up. Bed alarm on. Bed in lowest position. Bed wheels locked. Room door open. Call light and bedside table within reach. Telesitter in place. Goal: Absence of physical injury  Description  Absence of physical injury  1/25/2020 0425 by Bret Jones RN  Outcome: Ongoing     Problem: Nutrition  Goal: Optimal nutrition therapy  1/25/2020 0425 by Bret Jones RN  Outcome: Ongoing  Pt assisted w/PO intake this shift. Problem: Musculor/Skeletal Functional Status  Goal: Highest potential functional level  1/25/2020 0425 by Bret Jones RN  Outcome: Ongoing  ROM encouraged. Assisted pt w/repositioning.    Goal: Absence of falls  1/25/2020 0425 by Bret Jones RN  Outcome: Ongoing

## 2020-01-25 NOTE — PROGRESS NOTES
 Positive FIT (fecal immunochemical test)     Seizure (HCC)     Tubular adenoma of colon     Unspecified cerebral artery occlusion with cerebral infarction     Unspecified diseases of blood and blood-forming organs     Blood infections       Past Surgical History:   Procedure Laterality Date    APPENDECTOMY      CHOLECYSTECTOMY      COLONOSCOPY  03/20/2017    ONE 6 mm cecal polyp and few diverticula of L colon - path: tubular adenoma    HYSTERECTOMY      NE COLONOSCOPY W/BIOPSY SINGLE/MULTIPLE N/A 3/20/2017    COLONOSCOPY with cold biopsy and photos performed by Houston Royal MD at [address] EGD TRANSORAL BIOPSY SINGLE/MULTIPLE N/A 3/20/2017    EGD ESOPHAGOGASTRODUODENOSCOPY with cold biopsy and photos performed by Houston Royal MD at [facility]  03/20/2017    3 gastric polyps (4-6 mm) polyps were erythematous and edematous with superficial hemorrhage. PATH: hyperplastic polyp       No family history on file.     Social History     Socioeconomic History    Marital status:      Spouse name: Not on file    Number of children: Not on file    Years of education: Not on file    Highest education level: Not on file   Occupational History    Not on file   Social Needs    Financial resource strain: Not on file    Food insecurity:     Worry: Not on file     Inability: Not on file    Transportation needs:     Medical: Not on file     Non-medical: Not on file   Tobacco Use    Smoking status: Never Smoker    Smokeless tobacco: Never Used   Substance and Sexual Activity    Alcohol use: No    Drug use: No    Sexual activity: Never   Lifestyle    Physical activity:     Days per week: Not on file     Minutes per session: Not on file    Stress: Not on file   Relationships    Social connections:     Talks on phone: Not on file     Gets together: Not on file     Attends Taoist service: Not on file     Active member of club or dysdiadokinesis  No tremor   Normal fine motor  Orientation Alert and oriented x 1   Attention and concentration reduced  Short term memory reduced  Language process and speech normal . No aphasia   Cranial nerve 2 normal acuety and visual fields  Cranial nerve 3, 4 and 6 . Extraocular muscles are intact . Pupils are equal and reactive   Cranial nerve 5 . Intact corneal reflex. Normal facial sensation  Cranial nerve 7 normal exam   Cranial nerve 8. Grossly intact hearing   Cranial nerve 9 and 10. Symmetric palate elevation   Cranial nerve 11 , 5 out of 5 strength   Cranial Nerve 12 midline tongue . No atrophy  Sensation . Normal pinprick and light touch   Deep Tendon Reflexes normal  Plantar response flexor bilaterally  No meningeal signs    Assessment :    Metabolic encephalopathy . Acute renal failure .  Rule out infection     Plan:     As per medicine

## 2020-01-26 VITALS
HEIGHT: 62 IN | HEART RATE: 64 BPM | SYSTOLIC BLOOD PRESSURE: 134 MMHG | OXYGEN SATURATION: 95 % | RESPIRATION RATE: 18 BRPM | TEMPERATURE: 98.2 F | WEIGHT: 257.96 LBS | BODY MASS INDEX: 47.47 KG/M2 | DIASTOLIC BLOOD PRESSURE: 75 MMHG

## 2020-01-26 LAB
-: NORMAL
ALBUMIN SERPL-MCNC: 2.9 G/DL (ref 3.5–5.2)
ALBUMIN/GLOBULIN RATIO: ABNORMAL (ref 1–2.5)
ALP BLD-CCNC: 89 U/L (ref 35–104)
ALT SERPL-CCNC: 7 U/L (ref 5–33)
ANION GAP SERPL CALCULATED.3IONS-SCNC: 8 MMOL/L (ref 9–17)
AST SERPL-CCNC: 21 U/L
BILIRUB SERPL-MCNC: 0.37 MG/DL (ref 0.3–1.2)
BUN BLDV-MCNC: 22 MG/DL (ref 8–23)
BUN/CREAT BLD: ABNORMAL (ref 9–20)
CALCIUM SERPL-MCNC: 9.1 MG/DL (ref 8.6–10.4)
CHLORIDE BLD-SCNC: 109 MMOL/L (ref 98–107)
CO2: 24 MMOL/L (ref 20–31)
CREAT SERPL-MCNC: 1.19 MG/DL (ref 0.5–0.9)
GFR AFRICAN AMERICAN: 53 ML/MIN
GFR NON-AFRICAN AMERICAN: 44 ML/MIN
GFR SERPL CREATININE-BSD FRML MDRD: ABNORMAL ML/MIN/{1.73_M2}
GFR SERPL CREATININE-BSD FRML MDRD: ABNORMAL ML/MIN/{1.73_M2}
GLUCOSE BLD-MCNC: 120 MG/DL (ref 70–99)
GLUCOSE BLD-MCNC: 131 MG/DL (ref 65–105)
HCT VFR BLD CALC: 33.7 % (ref 36–46)
HEMOGLOBIN: 10.9 G/DL (ref 12–16)
MCH RBC QN AUTO: 29.4 PG (ref 26–34)
MCHC RBC AUTO-ENTMCNC: 32.4 G/DL (ref 31–37)
MCV RBC AUTO: 90.9 FL (ref 80–100)
NRBC AUTOMATED: ABNORMAL PER 100 WBC
PDW BLD-RTO: 13.8 % (ref 11.5–14.9)
PLATELET # BLD: 210 K/UL (ref 150–450)
PMV BLD AUTO: 7.6 FL (ref 6–12)
POTASSIUM SERPL-SCNC: 4.7 MMOL/L (ref 3.7–5.3)
RBC # BLD: 3.71 M/UL (ref 4–5.2)
REASON FOR REJECTION: NORMAL
SODIUM BLD-SCNC: 141 MMOL/L (ref 135–144)
TOTAL PROTEIN: 6 G/DL (ref 6.4–8.3)
WBC # BLD: 14.2 K/UL (ref 3.5–11)
ZZ NTE CLEAN UP: ORDERED TEST: NORMAL
ZZ NTE WITH NAME CLEAN UP: SPECIMEN SOURCE: NORMAL

## 2020-01-26 PROCEDURE — 6360000002 HC RX W HCPCS: Performed by: NURSE PRACTITIONER

## 2020-01-26 PROCEDURE — 85027 COMPLETE CBC AUTOMATED: CPT

## 2020-01-26 PROCEDURE — 36415 COLL VENOUS BLD VENIPUNCTURE: CPT

## 2020-01-26 PROCEDURE — 80053 COMPREHEN METABOLIC PANEL: CPT

## 2020-01-26 PROCEDURE — 99239 HOSP IP/OBS DSCHRG MGMT >30: CPT | Performed by: INTERNAL MEDICINE

## 2020-01-26 PROCEDURE — 6360000002 HC RX W HCPCS: Performed by: INTERNAL MEDICINE

## 2020-01-26 PROCEDURE — 82947 ASSAY GLUCOSE BLOOD QUANT: CPT

## 2020-01-26 PROCEDURE — 6370000000 HC RX 637 (ALT 250 FOR IP): Performed by: NURSE PRACTITIONER

## 2020-01-26 RX ORDER — HYDROCODONE BITARTRATE AND ACETAMINOPHEN 5; 325 MG/1; MG/1
1 TABLET ORAL EVERY 12 HOURS PRN
Qty: 9 TABLET | Refills: 0 | Status: SHIPPED | OUTPATIENT
Start: 2020-01-26 | End: 2020-01-29

## 2020-01-26 RX ORDER — QUETIAPINE FUMARATE 25 MG/1
25 TABLET, FILM COATED ORAL NIGHTLY
Qty: 60 TABLET | Refills: 3
Start: 2020-01-26

## 2020-01-26 RX ADMIN — CITALOPRAM HYDROBROMIDE 20 MG: 20 TABLET ORAL at 10:02

## 2020-01-26 RX ADMIN — LEVETIRACETAM 500 MG: 500 TABLET ORAL at 10:02

## 2020-01-26 RX ADMIN — MULTIPLE VITAMINS W/ MINERALS TAB 1 TABLET: TAB at 10:02

## 2020-01-26 RX ADMIN — POTASSIUM CHLORIDE 10 MEQ: 10 CAPSULE, COATED, EXTENDED RELEASE ORAL at 10:02

## 2020-01-26 RX ADMIN — GUAIFENESIN 1200 MG: 600 TABLET, EXTENDED RELEASE ORAL at 10:02

## 2020-01-26 RX ADMIN — HEPARIN SODIUM 5000 UNITS: 5000 INJECTION INTRAVENOUS; SUBCUTANEOUS at 05:49

## 2020-01-26 RX ADMIN — ANTI-FUNGAL POWDER MICONAZOLE NITRATE TALC FREE: 1.42 POWDER TOPICAL at 10:03

## 2020-01-26 RX ADMIN — ONDANSETRON 4 MG: 2 INJECTION INTRAMUSCULAR; INTRAVENOUS at 05:48

## 2020-01-26 RX ADMIN — METOPROLOL TARTRATE 50 MG: 50 TABLET, FILM COATED ORAL at 10:02

## 2020-01-26 RX ADMIN — POLYVINYL ALCOHOL 1 DROP: 14 SOLUTION/ DROPS OPHTHALMIC at 10:03

## 2020-01-26 RX ADMIN — ATORVASTATIN CALCIUM 20 MG: 20 TABLET, FILM COATED ORAL at 10:02

## 2020-01-26 RX ADMIN — Medication 1 TABLET: at 10:02

## 2020-01-26 ASSESSMENT — PAIN SCALES - GENERAL: PAINLEVEL_OUTOF10: 0

## 2020-01-26 NOTE — PROGRESS NOTES
Physical Therapy  DATE: 2020    NAME: Sonny Keene  MRN: [id number]   : 1945    Patient not seen this date for Physical Therapy due to:  [] Blood transfusion in progress  [] Hemodialysis  []  Patient Declined  [] Spine Precautions   [] Strict Bedrest  [] Surgery/ Procedure  [] Testing      [x] Other: Attempt @ 1440- pt discharged        [] PT being discontinued at this time. Patient independent. No further needs. [] PT being discontinued at this time as the patient has been transferred to palliative care. No further needs.     Tatyana Lemus, PTA

## 2020-01-26 NOTE — PROGRESS NOTES
[facility]    Progress Note    1/26/2020        Name:   Pepe Kitchen  MRN:     [id number]     Acct:      [de-identified]   Room:   2092/2092-01  IP Day:  4  Admit Date:  1/21/2020 10:10 PM    PCP:   Trung Guajardo MD  Code Status:  Full Code    Subjective:       Subjective:       1/26/2020     Chief Complaint   Patient presents with    Other     possible alter mental status; possible UTI         <principal problem not specified>   Active Problems:    Diabetes ([address]    Hypertension    BMI 50.0-59.9, adult ([address]    Depression    ANDREINA (obstructive sleep apnea)    Obesity hypoventilation syndrome ([address]    Acute kidney injury superimposed on CKD ([facility]    Delirium due to general medical condition    Metabolic encephalopathy    Moderate malnutrition ([facility]  Resolved Problems:    * No resolved hospital problems. *       Patient examined and chart reviewed . history obtained from patient with nursing input ,  BRIEF HISTORY ;      1/26/2020    · Patient is demented  · She has having she has been having yelling behaviors  · However she is much more calm today  · And  · She is comfortable and not complaining of any issues 1.    She is not aware where she is she thinks she is in a nursing home      I believe that she is back to her baseline   Her renal function has come back to baseline her BUN creatinine is       BMP:   Recent Labs     01/25/20  0529 01/26/20  0611   * 141   K 4.7 4.7   CO2 23 24   BUN 32* 22   CREATININE 1.20* 1.19*   LABGLOM 44* 44*   GLUCOSE 100* 120*       Admission her BUN was 94 creatinine was 2.87 on January 22         HX from admission ;                   Significant last 24 hr data reviewed ;   Vitals:    01/26/20 0057 01/26/20 0115 01/26/20 0419 01/26/20 0700   BP: (!) 171/66 (!) 150/68 (!) 148/70    Pulse: 86 88 84    Resp: 16 18 20    Temp: 97.9 °F (36.6 °C) 98.6 °F (37 °C) 98.6 °F (37 °C)    TempSrc: Oral      SpO2: 95%  96%    Weight: 257 lb 15.4 oz (117 kg)   Height:             Recent Results (from the past 24 hour(s))   POC Glucose Fingerstick    Collection Time: 01/25/20 11:38 AM   Result Value Ref Range    POC Glucose 110 (H) 65 - 105 mg/dL   POC Glucose Fingerstick    Collection Time: 01/25/20  4:27 PM   Result Value Ref Range    POC Glucose 117 (H) 65 - 105 mg/dL   POC Glucose Fingerstick    Collection Time: 01/25/20  9:11 PM   Result Value Ref Range    POC Glucose 112 (H) 65 - 105 mg/dL   SPECIMEN REJECTION    Collection Time: 01/26/20  5:17 AM   Result Value Ref Range    Specimen Source . BLOOD     Ordered Test CBC CMPX     Reason for Rejection Unable to perform testing: Specimen hemolyzed.      - NOT REPORTED    CBC    Collection Time: 01/26/20  6:11 AM   Result Value Ref Range    WBC 14.2 (H) 3.5 - 11.0 k/uL    RBC 3.71 (L) 4.0 - 5.2 m/uL    Hemoglobin 10.9 (L) 12.0 - 16.0 g/dL    Hematocrit 33.7 (L) 36 - 46 %    MCV 90.9 80 - 100 fL    MCH 29.4 26 - 34 pg    MCHC 32.4 31 - 37 g/dL    RDW 13.8 11.5 - 14.9 %    Platelets 930 951 - 693 k/uL    MPV 7.6 6.0 - 12.0 fL    NRBC Automated NOT REPORTED per 100 WBC   Comprehensive Metabolic Panel w/ Reflex to MG    Collection Time: 01/26/20  6:11 AM   Result Value Ref Range    Glucose 120 (H) 70 - 99 mg/dL    BUN 22 8 - 23 mg/dL    CREATININE 1.19 (H) 0.50 - 0.90 mg/dL    Bun/Cre Ratio NOT REPORTED 9 - 20    Calcium 9.1 8.6 - 10.4 mg/dL    Sodium 141 135 - 144 mmol/L    Potassium 4.7 3.7 - 5.3 mmol/L    Chloride 109 (H) 98 - 107 mmol/L    CO2 24 20 - 31 mmol/L    Anion Gap 8 (L) 9 - 17 mmol/L    Alkaline Phosphatase 89 35 - 104 U/L    ALT 7 5 - 33 U/L    AST 21 <32 U/L    Total Bilirubin 0.37 0.3 - 1.2 mg/dL    Total Protein 6.0 (L) 6.4 - 8.3 g/dL    Alb 2.9 (L) 3.5 - 5.2 g/dL    Albumin/Globulin Ratio NOT REPORTED 1.0 - 2.5    GFR Non- 44 (L) >60 mL/min    GFR  53 (L) >60 mL/min    GFR Comment          GFR Staging NOT REPORTED             Recent Labs 01/24/20  1954 01/25/20  0709 01/25/20  1138 01/25/20  1627 01/25/20  2111   POCGLU 100 89 110* 117* 112*        Us Renal Complete    Result Date: 1/25/2020  EXAMINATION: RETROPERITONEAL ULTRASOUND OF THE KIDNEYS 1/25/2020 COMPARISON: Ultrasound 01/02/2016, CT abdomen pelvis 01/22/2020 HISTORY: ORDERING SYSTEM PROVIDED HISTORY: renal failure TECHNOLOGIST PROVIDED HISTORY: renal failure Acuity: Acute Type of Exam: Initial FINDINGS: Kidneys: Evaluation challenge due to patient body habitus. The right kidney measures 10.3 x 6.1 x 5.0 cm in length and the left kidney measures 8.4 by 4.6 x 4.2 cm in length. Mild cortical thinning left 10.7 mm may suggest a degree of atrophy Kidneys demonstrate normal cortical echogenicity. No evidence of hydronephrosis or intrarenal stones. No evidence of hydronephrosis. Asymmetric size of the left kidney could suggest underlying atrophy. Review of Systems:     Respiratory                            Negative for cough,                                             Negative for shortness of breath . Negative for wheezing ,   Cardiovascular :                       Negative for chest pain,                                             Negative for palpitations . Negative for worsening or new leg edema . Gastrointestinal                      Negative for abdominal pain . Negative for change in bowel habits . Neurological / musculoskeletal;                                              No new symptoms               ----     Review of Systems                 . Interval History Status:  Patients symptoms and condition ;                            significantly improved.       Objective ;     Physical Exam   Vitals:  BP (!) 148/70   Pulse 84   Temp 98.6 °F (37 °C)   Resp 20   Ht 5' 2\" (1.575 m)   Wt 257 lb 15.4 oz (117 kg)   SpO2 HYDROcodone-acetaminophen, ipratropium-albuterol, sodium chloride flush, nicotine, acetaminophen, glucose, dextrose, glucagon (rDNA), dextrose  Yasmani Mcmillan MD    Please note that this chart was generated using voice recognition Dragon dictation software. Although every effort was made to ensure the accuracy of this automated transcription, some errors in transcription may have occurred. Yasmani Mcmillan MD  1/26/2020    [address], [address] [place] [address].    Phone [phone]   Fax: [phone]  Answering Service: [phone] i

## 2020-01-26 NOTE — PROGRESS NOTES
Pt. Watching somethng on T.V.. Pt. Stated see they're coming, I know they're coming.  Writer changed the channel per pt.'s request.

## 2020-01-26 NOTE — DISCHARGE SUMMARY
hypoventilation syndrome, who presents from [facility] for altered mental status with possible UTI. At time of exam, patient is somnolent, confused, and unable to provide any input into HPI. According to ED, patient's ECF reports that patient is normally alert, oriented, and conversant. She is currently being treated with IM Rocephin for elevated white blood count, though urine reportedly tested negative for UTI. Symptoms are associated with fatigue, and generalized pain. Denies fever, chills, cough, nausea, vomiting, diarrhea, and urinary symptoms. There are no aggravating or alleviating factors. Symptoms are reported as constant and moderate.       PAST MEDICAL HISTORY   Patient  has a past medical history of AMF (acute myelofibrosis) ([address] Anxiety, ARF (acute renal failure) ([address] Arthritis, CAD (coronary artery disease), Cancer of tonsil ([facility] Colon polyp, COPD (chronic obstructive pulmonary disease) ([facility] Dehydration, Depression, Diabetes mellitus type 2, controlled ([address] DJD (degenerative joint disease), Encephalopathy, Examination of participant in clinical trial, GERD (gastroesophageal reflux disease), Guaiac positive stools, Herpes, Hyperkalemia, Hyperplastic polyp of stomach, Hypertension, Hypothyroid, Multiple gastric polyps, Obesity, Positive FIT (fecal immunochemical test), Seizure ([facility] Tubular adenoma of colon, Unspecified cerebral artery occlusion with cerebral infarction, and Unspecified diseases of blood and blood-forming organs.     Through input noted  Active problem Metabolic encephalopathy . Acute renal failure . Rule out infection . The condition is she is improved alert conversive eating supper although oriented x1 lifting all limbs equally  . Renal indices have improved BUN/Creatinine 32/1.20 respectively . Patient has morbid obesity resident of ECF being on IM rocephin for elevated WBC possible UTI despite negative UA .  She became in ECF more somnolent confused less responsive brought to [facility] ER . Head CT with bilateral chronic periventricular small vessel ischemia . There is nonfocal motor, sensory or bulbar complaints . She was found to have renal failure BUN /Creatinine 3.33/94 respectively with postassium 5 . CT chest scattered irregular opacities consistent with residual scarring from prior mutifocal consolidated air space/ pneumonia . UA negative. WBC 14.2k. She has seizure disorder on keppra 500 mg po bid . Significant medications seroquel 25 mg po qhs ,  keppra 500 mg po bid , aspirin 81 mg po qd , lipitor 20 mg po qd  . Testing CT chest scattered irregular opacities consistent with residual scarring from prior mutifocal consolidated air space/ pneumonia. Head CT with bilateral chronic periventricular small vessel ischemia . UA negative       Nephrology input noted      Assessment:  1. Acute kidney injury on CKD, nonoliguric most likely secondary to prerenal azotemia, non-oliguric serum creatinine improving  with IV fluids   Ultrasound shows no hydronephrosis follow with asymmetric size of left kidney could suggest underlying atrophy           Significant Diagnostic Studies:   Labs / Micro:       Results for orders placed or performed during the hospital encounter of 01/21/20   Urine Culture   Result Value Ref Range    Specimen Description . URINE,STRAIGHT CATHETER     Special Requests NOT REPORTED     Culture NO GROWTH    C. difficile toxin Molecular   Result Value Ref Range    Specimen Description . FECES     C Difficile tox, pcr  NEGATIVE: C difficile tcdB nucleic acid not detected by RT-P     NEGATIVE: C difficile tcdB nucleic acid not detected by RT-PCR.    Ammonia   Result Value Ref Range    Ammonia 34 11 - 51 umol/L   CBC Auto Differential   Result Value Ref Range    WBC 14.2 (H) 3.5 - 11.0 k/uL    RBC 3.87 (L) 4.0 - 5.2 m/uL    Hemoglobin 11.7 (L) 12.0 - 16.0 g/dL    Hematocrit 35.1 (L) 36 - 46 %    MCV 90.5 80 - 100 fL    MCH 30.3 26 - 34 pg    MCHC 33.5 31 - 37 g/dL RDW 13.7 11.5 - 14.9 %    Platelets 943 544 - 506 k/uL    MPV 8.0 6.0 - 12.0 fL    NRBC Automated NOT REPORTED per 100 WBC    Differential Type NOT REPORTED     Immature Granulocytes NOT REPORTED 0 %    Absolute Immature Granulocyte NOT REPORTED 0.00 - 0.30 k/uL    WBC Morphology NOT REPORTED     RBC Morphology NOT REPORTED     Platelet Estimate NOT REPORTED     Seg Neutrophils 78 (H) 36 - 66 %    Lymphocytes 12 (L) 24 - 44 %    Monocytes 4 1 - 7 %    Eosinophils % 6 (H) 0 - 4 %    Basophils 0 0 - 2 %    Segs Absolute 11.08 (H) 1.3 - 9.1 k/uL    Absolute Lymph # 1.70 1.0 - 4.8 k/uL    Absolute Mono # 0.57 0.1 - 1.3 k/uL    Absolute Eos # 0.85 (H) 0.0 - 0.4 k/uL    Basophils Absolute 0.00 0.0 - 0.2 k/uL    Morphology Normal    Comprehensive Metabolic Panel   Result Value Ref Range    Glucose 88 70 - 99 mg/dL    BUN 99 (HH) 8 - 23 mg/dL    CREATININE 3.33 (H) 0.50 - 0.90 mg/dL    Bun/Cre Ratio NOT REPORTED 9 - 20    Calcium 10.1 8.6 - 10.4 mg/dL    Sodium 136 135 - 144 mmol/L    Potassium 5.1 3.7 - 5.3 mmol/L    Chloride 96 (L) 98 - 107 mmol/L    CO2 22 20 - 31 mmol/L    Anion Gap 18 (H) 9 - 17 mmol/L    Alkaline Phosphatase 120 (H) 35 - 104 U/L    ALT <5 (L) 5 - 33 U/L    AST 12 <32 U/L    Total Bilirubin 0.20 (L) 0.3 - 1.2 mg/dL    Total Protein 7.0 6.4 - 8.3 g/dL    Alb 3.2 (L) 3.5 - 5.2 g/dL    Albumin/Globulin Ratio NOT REPORTED 1.0 - 2.5    GFR Non-African American 13 (L) >60 mL/min    GFR  16 (L) >60 mL/min    GFR Comment          GFR Staging NOT REPORTED    Magnesium   Result Value Ref Range    Magnesium 2.2 1.6 - 2.6 mg/dL   Urinalysis   Result Value Ref Range    Color, UA YELLOW YELLOW    Turbidity UA CLEAR CLEAR    Glucose, Ur NEGATIVE NEGATIVE    Bilirubin Urine (A) NEGATIVE     Presumptive positive. Unable to confirm due to unavailability of reagent.     Ketones, Urine SMALL (A) NEGATIVE    Specific [place], UA 1.015 1.000 - 1.030    Urine Hgb NEGATIVE NEGATIVE    pH, UA 5.0 5.0 - 8.0 Potassium 4.9 3.7 - 5.3 mmol/L    Chloride 110 (H) 98 - 107 mmol/L    CO2 22 20 - 31 mmol/L    Anion Gap 12 9 - 17 mmol/L    Alkaline Phosphatase 93 35 - 104 U/L    ALT <5 (L) 5 - 33 U/L    AST 12 <32 U/L    Total Bilirubin 0.18 (L) 0.3 - 1.2 mg/dL    Total Protein 5.7 (L) 6.4 - 8.3 g/dL    Alb 2.6 (L) 3.5 - 5.2 g/dL    Albumin/Globulin Ratio NOT REPORTED 1.0 - 2.5    GFR Non-African American 28 (L) >60 mL/min    GFR  34 (L) >60 mL/min    GFR Comment          GFR Staging NOT REPORTED    Magnesium   Result Value Ref Range    Magnesium 2.1 1.6 - 2.6 mg/dL   CBC   Result Value Ref Range    WBC 8.7 3.5 - 11.0 k/uL    RBC 3.46 (L) 4.0 - 5.2 m/uL    Hemoglobin 10.2 (L) 12.0 - 16.0 g/dL    Hematocrit 31.7 (L) 36 - 46 %    MCV 91.8 80 - 100 fL    MCH 29.5 26 - 34 pg    MCHC 32.1 31 - 37 g/dL    RDW 13.5 11.5 - 14.9 %    Platelets 528 950 - 018 k/uL    MPV 8.1 6.0 - 12.0 fL    NRBC Automated NOT REPORTED per 100 WBC   Protime-INR   Result Value Ref Range    Protime 13.3 11.8 - 14.6 sec    INR 1.0    Comprehensive Metabolic Panel w/ Reflex to MG   Result Value Ref Range    Glucose 125 (H) 70 - 99 mg/dL    BUN 45 (H) 8 - 23 mg/dL    CREATININE 1.39 (H) 0.50 - 0.90 mg/dL    Bun/Cre Ratio NOT REPORTED 9 - 20    Calcium 9.5 8.6 - 10.4 mg/dL    Sodium 148 (H) 135 - 144 mmol/L    Potassium 4.5 3.7 - 5.3 mmol/L    Chloride 113 (H) 98 - 107 mmol/L    CO2 23 20 - 31 mmol/L    Anion Gap 12 9 - 17 mmol/L    Alkaline Phosphatase 97 35 - 104 U/L    ALT 6 5 - 33 U/L    AST 16 <32 U/L    Total Bilirubin 0.24 (L) 0.3 - 1.2 mg/dL    Total Protein 6.1 (L) 6.4 - 8.3 g/dL    Alb 3.1 (L) 3.5 - 5.2 g/dL    Albumin/Globulin Ratio NOT REPORTED 1.0 - 2.5    GFR Non- 37 (L) >60 mL/min    GFR  45 (L) >60 mL/min    GFR Comment          GFR Staging NOT REPORTED    CBC   Result Value Ref Range    WBC 10.4 3.5 - 11.0 k/uL    RBC 3.89 (L) 4.0 - 5.2 m/uL    Hemoglobin 11.5 (L) 12.0 - 16.0 g/dL    Hematocrit orbits demonstrate no acute abnormality. SINUSES: Moderate to severe ethmoid sinus and sphenoid sinus mucosal thickening. The moderate mucosal thickening extending into left frontal sinus. SOFT TISSUES/SKULL:  No acute abnormality of the visualized skull or soft tissues. No acute intracranial abnormality. Generalized involutional changes and chronic small vessel ischemic disease. Hypoattenuation along the right precentral gyrus suggestive of remote stroke. Please correlate exam findings. Moderate severe paranasal sinus disease. Us Renal Complete    Result Date: 1/25/2020  EXAMINATION: RETROPERITONEAL ULTRASOUND OF THE KIDNEYS 1/25/2020 COMPARISON: Ultrasound 01/02/2016, CT abdomen pelvis 01/22/2020 HISTORY: ORDERING SYSTEM PROVIDED HISTORY: renal failure TECHNOLOGIST PROVIDED HISTORY: renal failure Acuity: Acute Type of Exam: Initial FINDINGS: Kidneys: Evaluation challenge due to patient body habitus. The right kidney measures 10.3 x 6.1 x 5.0 cm in length and the left kidney measures 8.4 by 4.6 x 4.2 cm in length. Mild cortical thinning left 10.7 mm may suggest a degree of atrophy Kidneys demonstrate normal cortical echogenicity. No evidence of hydronephrosis or intrarenal stones. No evidence of hydronephrosis. Asymmetric size of the left kidney could suggest underlying atrophy. Xr Chest Portable    Result Date: 1/21/2020  EXAMINATION: ONE XRAY VIEW OF THE CHEST 1/21/2020 11:28 pm COMPARISON: July 3, 2019 HISTORY: ORDERING SYSTEM PROVIDED HISTORY: AMS TECHNOLOGIST PROVIDED HISTORY: AMS Reason for Exam: AMS Acuity: Acute Type of Exam: Initial FINDINGS: Ill-defined right upper and lower lobe nodular densities persist.  Lungs otherwise clear. Heart and mediastinum normal.  Bony thorax intact. Persistent right upper lobe nodular density. Recommend follow-up CT chest with IV contrast to exclude neoplasm.      Ct Chest Abdomen Pelvis Wo Contrast    Result Date: 1/22/2020  EXAMINATION: CT OF THE CHEST, ABDOMEN, AND PELVIS WITHOUT CONTRAST 1/22/2020 12:10 am TECHNIQUE: CT of the chest, abdomen and pelvis was performed without the administration of intravenous contrast. Multiplanar reformatted images are provided for review. Dose modulation, iterative reconstruction, and/or weight based adjustment of the mA/kV was utilized to reduce the radiation dose to as low as reasonably achievable. COMPARISON: Chest x-ray 01/21/2020 CT chest 06/27/2019 HISTORY: ORDERING SYSTEM PROVIDED HISTORY: altered mental status new renal failure unknown etiology TECHNOLOGIST PROVIDED HISTORY: altered mental status new renal failure unknown etiology Reason for Exam: AMS, decreased renal function, unable to obtain accurate patient history due to pt condition Acuity: Unknown Relevant Medical/Surgical History: surgeries to area of interest (per epic) include hysterectomy, appendectony, cholecystectomy FINDINGS: Chest: Mediastinum: There is no mediastinal adenopathy or hematoma. Calcified plaque in the aortic arch. No evidence of thoracic aortic aneurysm. Calcific coronary artery disease. The heart size is normal.  Calcification in the mitral annulus. Lungs/pleura: There are scattered irregular parenchymal opacities consistent with residual scarring from the multifocal airspace disease evident on 06/27/2019. No acute infiltrate, pneumothorax or pleural effusion. Soft Tissues/Bones: No acute bone or soft tissue abnormality. Abdomen/Pelvis: Organs: The gallbladder is surgically absent. The liver, spleen, pancreas and adrenal glands are grossly normal with the limitations of a noncontrast study. There is unchanged left renal atrophy. There is bilateral renal sinus lipomatosis. GI/Bowel: Unopacified bowel loops are unremarkable. No bowel obstruction. No evidence of appendicitis.  Pelvis: Urinary bladder is grossly normal.  The uterus is apparently surgically absent Peritoneum/Retroperitoneum: There is no adenopathy, free air or free fluid. Bones/Soft Tissues: No acute bone or soft tissue abnormality. Obesity. No acute finding in the chest, abdomen or pelvis. Scattered irregular parenchymal opacities are most consistent with residual scarring from the multifocal consolidative airspace disease/pneumonia evident on 06/27/2019. There is unchanged left renal atrophy. No evidence of hydronephrosis. Consultations:    Consults:     Final Specialist Recommendations/Findings:   IP CONSULT TO NEPHROLOGY  IP CONSULT TO NEUROLOGY      The patient was seen and examined on day of discharge and this discharge summary is in conjunction with any daily progress note from day of discharge. Discharge plan:     Disposition: Skilled nursing facility physician   Follow Up: With PCP and as specified     Requiring Further Evaluation/Follow Up POST HOSPITALIZATION/Incidental Findings:    Diet: regular     Activity: As tolerated    I  Discharge Medications:      Medication List      ASK your doctor about these medications    aspirin 81 MG EC tablet  Take 1 tablet by mouth daily.      atorvastatin 20 MG tablet  Commonly known as:  LIPITOR     benzocaine-menthol 15-3.6 MG lozenge  Commonly known as:  CEPACOL SORE THROAT     benzonatate 100 MG capsule  Commonly known as:  TESSALON     bisacodyl 10 MG suppository  Commonly known as:  DULCOLAX     Caltrate 600+D 600-800 MG-UNIT Tabs  Generic drug:  Calcium Carb-Cholecalciferol     cetirizine 10 MG tablet  Commonly known as:  ZYRTEC     citalopram 20 MG tablet  Commonly known as:  CELEXA     Cranberry 475 MG Caps     cycloSPORINE 0.05 % ophthalmic emulsion  Commonly known as:  RESTASIS     diphenhydrAMINE 25 MG capsule  Commonly known as:  BENADRYL     docusate sodium 100 MG capsule  Commonly known as:  COLACE     Estradiol 10 % Crea     ferrous sulfate 325 (65 Fe) MG tablet     fluticasone 50 MCG/ACT nasal spray  Commonly known as:  FLONASE     furosemide 40 MG tablet  Commonly known as:  LASIX     gabapentin occurred.

## 2020-01-26 NOTE — CARE COORDINATION
Writer spoke with Miller Maldonado at [facility] and can accept patient back to day. Will need to call report to [phone] and fax Terry Charlton Chula to [phone]. Faxed paperwork to Alireza Watson to get a time for transport. Waiting on call back    Electronically signed by Joshua Parks RN on 1/26/2020 at 10:21 AM     [facility] called back and advised they can  patent now.     Electronically signed by Joshua Parks RN on 1/26/2020 at 10:38 AM

## 2020-01-26 NOTE — PLAN OF CARE
membranes.   1/26/2020 0240 by Kyra Sosa RN  Outcome: Met This Shift  1/25/2020 1728 by Mary Kerr RN  Outcome: Met This Shift     Problem: Falls - Risk of:  Goal: Will remain free from falls  Description  Will remain free from falls  1/26/2020 0240 by Kyra Sosa RN  Outcome: Met This Shift  1/25/2020 1728 by Mary Kerr RN  Outcome: Met This Shift  Goal: Absence of physical injury  Description  Absence of physical injury  1/26/2020 0240 by Kyra Sosa RN  Outcome: Met This Shift  1/25/2020 1728 by Mary Kerr RN  Outcome: Met This Shift     Problem: Nutrition  Goal: Optimal nutrition therapy  1/26/2020 0240 by Kyra Sosa RN  Outcome: Met This Shift  1/25/2020 1728 by Mary Kerr RN  Outcome: Met This Shift     Problem: Musculor/Skeletal Functional Status  Goal: Highest potential functional level  1/26/2020 0240 by Kyra Sosa RN  Outcome: Met This Shift  1/25/2020 1728 by Mary Kerr RN  Outcome: Met This Shift  Goal: Absence of falls  1/26/2020 0240 by Kyra Sosa RN  Outcome: Met This Shift  1/25/2020 1728 by Mary Kerr RN  Outcome: Met This Shift

## 2020-01-26 NOTE — CARE COORDINATION
Write Faxed KHRIS to [place] [employer] and slo called and spoke with Thompson  on patient being picked up now. Osmar Lyon RN to call report to [facility] [employer], [phone].     Electronically signed by Cristy Lincoln RN on 1/26/2020 at 11:08 AM

## 2020-01-30 ENCOUNTER — APPOINTMENT (OUTPATIENT)
Dept: GENERAL RADIOLOGY | Age: 75
DRG: 871 | End: 2020-01-30
Payer: MEDICARE

## 2020-01-30 ENCOUNTER — HOSPITAL ENCOUNTER (INPATIENT)
Age: 75
LOS: 7 days | Discharge: NURSING, REHAB OR LONG-TERM CARE | DRG: 871 | End: 2020-02-06
Attending: EMERGENCY MEDICINE | Admitting: INTERNAL MEDICINE
Payer: MEDICARE

## 2020-01-30 ENCOUNTER — APPOINTMENT (OUTPATIENT)
Dept: CT IMAGING | Age: 75
DRG: 871 | End: 2020-01-30
Payer: MEDICARE

## 2020-01-30 PROBLEM — Z79.4 TYPE 2 DIABETES MELLITUS, WITH LONG-TERM CURRENT USE OF INSULIN (HCC): Status: ACTIVE | Noted: 2020-01-30

## 2020-01-30 PROBLEM — R41.82 ALTERED MENTAL STATUS: Status: ACTIVE | Noted: 2020-01-30

## 2020-01-30 PROBLEM — G93.40 ACUTE ENCEPHALOPATHY: Status: ACTIVE | Noted: 2020-01-30

## 2020-01-30 PROBLEM — E11.9 TYPE 2 DIABETES MELLITUS, WITH LONG-TERM CURRENT USE OF INSULIN (HCC): Chronic | Status: ACTIVE | Noted: 2020-01-30

## 2020-01-30 PROBLEM — N39.0 RECURRENT UTI: Status: ACTIVE | Noted: 2020-01-30

## 2020-01-30 PROBLEM — Z79.4 TYPE 2 DIABETES MELLITUS, WITH LONG-TERM CURRENT USE OF INSULIN (HCC): Chronic | Status: ACTIVE | Noted: 2020-01-30

## 2020-01-30 PROBLEM — E11.9 TYPE 2 DIABETES MELLITUS, WITH LONG-TERM CURRENT USE OF INSULIN (HCC): Status: ACTIVE | Noted: 2020-01-30

## 2020-01-30 PROBLEM — R56.9 SEIZURE (HCC): Chronic | Status: ACTIVE | Noted: 2017-10-16

## 2020-01-30 PROBLEM — I10 HYPERTENSION: Chronic | Status: ACTIVE | Noted: 2017-10-16

## 2020-01-30 LAB
-: ABNORMAL
ABSOLUTE EOS #: 0.38 K/UL (ref 0–0.44)
ABSOLUTE IMMATURE GRANULOCYTE: 0.36 K/UL (ref 0–0.3)
ABSOLUTE LYMPH #: 2.07 K/UL (ref 1.1–3.7)
ABSOLUTE MONO #: 0.86 K/UL (ref 0.1–1.2)
ALBUMIN SERPL-MCNC: 3.4 G/DL (ref 3.5–5.2)
ALBUMIN/GLOBULIN RATIO: 1 (ref 1–2.5)
ALLEN TEST: ABNORMAL
ALP BLD-CCNC: 89 U/L (ref 35–104)
ALT SERPL-CCNC: 7 U/L (ref 5–33)
AMMONIA: 18 UMOL/L (ref 11–51)
AMORPHOUS: ABNORMAL
ANION GAP SERPL CALCULATED.3IONS-SCNC: 15 MMOL/L (ref 9–17)
AST SERPL-CCNC: 15 U/L
BACTERIA: ABNORMAL
BASOPHILS # BLD: 1 % (ref 0–2)
BASOPHILS ABSOLUTE: 0.09 K/UL (ref 0–0.2)
BILIRUB SERPL-MCNC: 0.65 MG/DL (ref 0.3–1.2)
BILIRUBIN DIRECT: 0.17 MG/DL
BILIRUBIN URINE: NEGATIVE
BILIRUBIN, INDIRECT: 0.48 MG/DL (ref 0–1)
BNP INTERPRETATION: ABNORMAL
BUN BLDV-MCNC: 18 MG/DL (ref 8–23)
BUN/CREAT BLD: ABNORMAL (ref 9–20)
CALCIUM SERPL-MCNC: 9.4 MG/DL (ref 8.6–10.4)
CARBOXYHEMOGLOBIN: 0.5 % (ref 0–5)
CASTS UA: ABNORMAL /LPF (ref 0–8)
CHLORIDE BLD-SCNC: 100 MMOL/L (ref 98–107)
CO2: 27 MMOL/L (ref 20–31)
COLOR: YELLOW
CREAT SERPL-MCNC: 1.17 MG/DL (ref 0.5–0.9)
CRYSTALS, UA: ABNORMAL /HPF
DIFFERENTIAL TYPE: ABNORMAL
EOSINOPHILS RELATIVE PERCENT: 3 % (ref 1–4)
EPITHELIAL CELLS UA: ABNORMAL /HPF (ref 0–5)
FIO2: ABNORMAL
FOLATE: 8.8 NG/ML
GFR AFRICAN AMERICAN: 55 ML/MIN
GFR NON-AFRICAN AMERICAN: 45 ML/MIN
GFR SERPL CREATININE-BSD FRML MDRD: ABNORMAL ML/MIN/{1.73_M2}
GFR SERPL CREATININE-BSD FRML MDRD: ABNORMAL ML/MIN/{1.73_M2}
GLOBULIN: ABNORMAL G/DL (ref 1.5–3.8)
GLUCOSE BLD-MCNC: 108 MG/DL (ref 65–105)
GLUCOSE BLD-MCNC: 118 MG/DL
GLUCOSE BLD-MCNC: 118 MG/DL (ref 65–105)
GLUCOSE BLD-MCNC: 121 MG/DL (ref 65–105)
GLUCOSE BLD-MCNC: 127 MG/DL (ref 70–99)
GLUCOSE URINE: NEGATIVE
HCO3 VENOUS: 27.5 MMOL/L (ref 24–30)
HCT VFR BLD CALC: 38.4 % (ref 36.3–47.1)
HEMOGLOBIN: 11.8 G/DL (ref 11.9–15.1)
IMMATURE GRANULOCYTES: 3 %
KEPPRA: 40 UG/ML
KETONES, URINE: ABNORMAL
LACTIC ACID, WHOLE BLOOD: 1.3 MMOL/L (ref 0.7–2.1)
LACTIC ACID: NORMAL MMOL/L
LEUKOCYTE ESTERASE, URINE: ABNORMAL
LYMPHOCYTES # BLD: 17 % (ref 24–43)
MCH RBC QN AUTO: 29.4 PG (ref 25.2–33.5)
MCHC RBC AUTO-ENTMCNC: 30.7 G/DL (ref 28.4–34.8)
MCV RBC AUTO: 95.5 FL (ref 82.6–102.9)
METHEMOGLOBIN: ABNORMAL % (ref 0–1.5)
MODE: ABNORMAL
MONOCYTES # BLD: 7 % (ref 3–12)
MUCUS: ABNORMAL
NEGATIVE BASE EXCESS, VEN: ABNORMAL MMOL/L (ref 0–2)
NITRITE, URINE: NEGATIVE
NOTIFICATION TIME: ABNORMAL
NOTIFICATION: ABNORMAL
NRBC AUTOMATED: 0 PER 100 WBC
O2 DEVICE/FLOW/%: ABNORMAL
O2 SAT, VEN: 95.9 % (ref 60–85)
OTHER OBSERVATIONS UA: ABNORMAL
OXYHEMOGLOBIN: ABNORMAL % (ref 95–98)
PATIENT TEMP: 37
PCO2, VEN, TEMP ADJ: ABNORMAL MMHG (ref 39–55)
PCO2, VEN: 46.6 (ref 39–55)
PDW BLD-RTO: 13.7 % (ref 11.8–14.4)
PEEP/CPAP: ABNORMAL
PH UA: 5.5 (ref 5–8)
PH VENOUS: 7.39 (ref 7.32–7.42)
PH, VEN, TEMP ADJ: ABNORMAL (ref 7.32–7.42)
PLATELET # BLD: 184 K/UL (ref 138–453)
PLATELET ESTIMATE: ABNORMAL
PMV BLD AUTO: 10.7 FL (ref 8.1–13.5)
PO2, VEN, TEMP ADJ: ABNORMAL MMHG (ref 30–50)
PO2, VEN: 88.6 (ref 30–50)
POSITIVE BASE EXCESS, VEN: 2.5 MMOL/L (ref 0–2)
POTASSIUM SERPL-SCNC: 4.3 MMOL/L (ref 3.7–5.3)
PRO-BNP: 3422 PG/ML
PROTEIN UA: ABNORMAL
PSV: ABNORMAL
PT. POSITION: ABNORMAL
RBC # BLD: 4.02 M/UL (ref 3.95–5.11)
RBC # BLD: ABNORMAL 10*6/UL
RBC UA: ABNORMAL /HPF (ref 0–4)
RENAL EPITHELIAL, UA: ABNORMAL /HPF
RESPIRATORY RATE: ABNORMAL
SAMPLE SITE: ABNORMAL
SEG NEUTROPHILS: 69 % (ref 36–65)
SEGMENTED NEUTROPHILS ABSOLUTE COUNT: 8.65 K/UL (ref 1.5–8.1)
SET RATE: ABNORMAL
SODIUM BLD-SCNC: 142 MMOL/L (ref 135–144)
SPECIFIC GRAVITY UA: 1.02 (ref 1–1.03)
TEXT FOR RESPIRATORY: ABNORMAL
THYROXINE, FREE: 1.98 NG/DL (ref 0.93–1.7)
TOTAL HB: ABNORMAL G/DL (ref 12–16)
TOTAL PROTEIN: 6.7 G/DL (ref 6.4–8.3)
TOTAL RATE: ABNORMAL
TRICHOMONAS: ABNORMAL
TROPONIN INTERP: ABNORMAL
TROPONIN INTERP: ABNORMAL
TROPONIN T: ABNORMAL NG/ML
TROPONIN T: ABNORMAL NG/ML
TROPONIN, HIGH SENSITIVITY: 32 NG/L (ref 0–14)
TROPONIN, HIGH SENSITIVITY: 32 NG/L (ref 0–14)
TSH SERPL DL<=0.05 MIU/L-ACNC: 8.51 MIU/L (ref 0.3–5)
TURBIDITY: ABNORMAL
URINE HGB: NEGATIVE
UROBILINOGEN, URINE: NORMAL
VITAMIN B-12: 726 PG/ML (ref 232–1245)
VITAMIN D 25-HYDROXY: 38.8 NG/ML (ref 30–100)
VT: ABNORMAL
WBC # BLD: 12.4 K/UL (ref 3.5–11.3)
WBC # BLD: ABNORMAL 10*3/UL
WBC UA: ABNORMAL /HPF (ref 0–5)
YEAST: ABNORMAL

## 2020-01-30 PROCEDURE — 80076 HEPATIC FUNCTION PANEL: CPT

## 2020-01-30 PROCEDURE — 36415 COLL VENOUS BLD VENIPUNCTURE: CPT

## 2020-01-30 PROCEDURE — 6360000002 HC RX W HCPCS: Performed by: STUDENT IN AN ORGANIZED HEALTH CARE EDUCATION/TRAINING PROGRAM

## 2020-01-30 PROCEDURE — 99223 1ST HOSP IP/OBS HIGH 75: CPT | Performed by: INTERNAL MEDICINE

## 2020-01-30 PROCEDURE — 6370000000 HC RX 637 (ALT 250 FOR IP): Performed by: STUDENT IN AN ORGANIZED HEALTH CARE EDUCATION/TRAINING PROGRAM

## 2020-01-30 PROCEDURE — 84484 ASSAY OF TROPONIN QUANT: CPT

## 2020-01-30 PROCEDURE — 82746 ASSAY OF FOLIC ACID SERUM: CPT

## 2020-01-30 PROCEDURE — 2580000003 HC RX 258: Performed by: EMERGENCY MEDICINE

## 2020-01-30 PROCEDURE — 85025 COMPLETE CBC W/AUTO DIFF WBC: CPT

## 2020-01-30 PROCEDURE — 51798 US URINE CAPACITY MEASURE: CPT

## 2020-01-30 PROCEDURE — 95816 EEG AWAKE AND DROWSY: CPT

## 2020-01-30 PROCEDURE — 87186 SC STD MICRODIL/AGAR DIL: CPT

## 2020-01-30 PROCEDURE — 82947 ASSAY GLUCOSE BLOOD QUANT: CPT

## 2020-01-30 PROCEDURE — 6360000002 HC RX W HCPCS: Performed by: EMERGENCY MEDICINE

## 2020-01-30 PROCEDURE — 83880 ASSAY OF NATRIURETIC PEPTIDE: CPT

## 2020-01-30 PROCEDURE — 84439 ASSAY OF FREE THYROXINE: CPT

## 2020-01-30 PROCEDURE — 82140 ASSAY OF AMMONIA: CPT

## 2020-01-30 PROCEDURE — 93005 ELECTROCARDIOGRAM TRACING: CPT | Performed by: STUDENT IN AN ORGANIZED HEALTH CARE EDUCATION/TRAINING PROGRAM

## 2020-01-30 PROCEDURE — 70450 CT HEAD/BRAIN W/O DYE: CPT

## 2020-01-30 PROCEDURE — 87086 URINE CULTURE/COLONY COUNT: CPT

## 2020-01-30 PROCEDURE — 81001 URINALYSIS AUTO W/SCOPE: CPT

## 2020-01-30 PROCEDURE — 82306 VITAMIN D 25 HYDROXY: CPT

## 2020-01-30 PROCEDURE — 1200000000 HC SEMI PRIVATE

## 2020-01-30 PROCEDURE — 80177 DRUG SCRN QUAN LEVETIRACETAM: CPT

## 2020-01-30 PROCEDURE — 2580000003 HC RX 258: Performed by: STUDENT IN AN ORGANIZED HEALTH CARE EDUCATION/TRAINING PROGRAM

## 2020-01-30 PROCEDURE — 82607 VITAMIN B-12: CPT

## 2020-01-30 PROCEDURE — 84146 ASSAY OF PROLACTIN: CPT

## 2020-01-30 PROCEDURE — 87040 BLOOD CULTURE FOR BACTERIA: CPT

## 2020-01-30 PROCEDURE — 80048 BASIC METABOLIC PNL TOTAL CA: CPT

## 2020-01-30 PROCEDURE — 71046 X-RAY EXAM CHEST 2 VIEWS: CPT

## 2020-01-30 PROCEDURE — 87077 CULTURE AEROBIC IDENTIFY: CPT

## 2020-01-30 PROCEDURE — 83605 ASSAY OF LACTIC ACID: CPT

## 2020-01-30 PROCEDURE — 84443 ASSAY THYROID STIM HORMONE: CPT

## 2020-01-30 PROCEDURE — 99285 EMERGENCY DEPT VISIT HI MDM: CPT

## 2020-01-30 PROCEDURE — 82805 BLOOD GASES W/O2 SATURATION: CPT

## 2020-01-30 PROCEDURE — 71250 CT THORAX DX C-: CPT

## 2020-01-30 RX ORDER — DEXTROSE MONOHYDRATE 50 MG/ML
100 INJECTION, SOLUTION INTRAVENOUS PRN
Status: DISCONTINUED | OUTPATIENT
Start: 2020-01-30 | End: 2020-02-06 | Stop reason: HOSPADM

## 2020-01-30 RX ORDER — LEVETIRACETAM 500 MG/1
500 TABLET ORAL 2 TIMES DAILY
Status: DISCONTINUED | OUTPATIENT
Start: 2020-01-30 | End: 2020-02-06 | Stop reason: HOSPADM

## 2020-01-30 RX ORDER — SODIUM CHLORIDE 0.9 % (FLUSH) 0.9 %
10 SYRINGE (ML) INJECTION PRN
Status: DISCONTINUED | OUTPATIENT
Start: 2020-01-30 | End: 2020-02-06 | Stop reason: HOSPADM

## 2020-01-30 RX ORDER — HEPARIN SODIUM 5000 [USP'U]/ML
5000 INJECTION, SOLUTION INTRAVENOUS; SUBCUTANEOUS EVERY 8 HOURS SCHEDULED
Status: DISCONTINUED | OUTPATIENT
Start: 2020-01-30 | End: 2020-02-06 | Stop reason: HOSPADM

## 2020-01-30 RX ORDER — 0.9 % SODIUM CHLORIDE 0.9 %
500 INTRAVENOUS SOLUTION INTRAVENOUS ONCE
Status: COMPLETED | OUTPATIENT
Start: 2020-01-30 | End: 2020-01-30

## 2020-01-30 RX ORDER — LEVOTHYROXINE SODIUM 0.1 MG/1
100 TABLET ORAL DAILY
Status: DISCONTINUED | OUTPATIENT
Start: 2020-01-30 | End: 2020-02-06 | Stop reason: HOSPADM

## 2020-01-30 RX ORDER — SODIUM CHLORIDE 9 MG/ML
INJECTION, SOLUTION INTRAVENOUS CONTINUOUS
Status: DISCONTINUED | OUTPATIENT
Start: 2020-01-31 | End: 2020-02-06

## 2020-01-30 RX ORDER — NICOTINE POLACRILEX 4 MG
15 LOZENGE BUCCAL PRN
Status: DISCONTINUED | OUTPATIENT
Start: 2020-01-30 | End: 2020-02-06 | Stop reason: HOSPADM

## 2020-01-30 RX ORDER — HALOPERIDOL 5 MG/ML
5 INJECTION INTRAMUSCULAR ONCE
Status: DISCONTINUED | OUTPATIENT
Start: 2020-01-30 | End: 2020-01-30

## 2020-01-30 RX ORDER — DEXTROSE MONOHYDRATE 25 G/50ML
12.5 INJECTION, SOLUTION INTRAVENOUS PRN
Status: DISCONTINUED | OUTPATIENT
Start: 2020-01-30 | End: 2020-02-06 | Stop reason: HOSPADM

## 2020-01-30 RX ORDER — METOPROLOL TARTRATE 50 MG/1
50 TABLET, FILM COATED ORAL 2 TIMES DAILY
Status: DISCONTINUED | OUTPATIENT
Start: 2020-01-30 | End: 2020-02-06 | Stop reason: HOSPADM

## 2020-01-30 RX ORDER — ACETAMINOPHEN 325 MG/1
650 TABLET ORAL EVERY 4 HOURS PRN
Status: CANCELLED | OUTPATIENT
Start: 2020-01-30

## 2020-01-30 RX ORDER — AMLODIPINE BESYLATE 5 MG/1
5 TABLET ORAL DAILY
Status: DISCONTINUED | OUTPATIENT
Start: 2020-01-30 | End: 2020-02-05

## 2020-01-30 RX ORDER — HALOPERIDOL 5 MG/ML
5 INJECTION INTRAMUSCULAR ONCE
Status: COMPLETED | OUTPATIENT
Start: 2020-01-30 | End: 2020-01-30

## 2020-01-30 RX ORDER — ZIPRASIDONE MESYLATE 20 MG/ML
10 INJECTION, POWDER, LYOPHILIZED, FOR SOLUTION INTRAMUSCULAR ONCE
Status: DISCONTINUED | OUTPATIENT
Start: 2020-01-30 | End: 2020-01-30

## 2020-01-30 RX ORDER — ASPIRIN 81 MG/1
81 TABLET ORAL DAILY
Status: DISCONTINUED | OUTPATIENT
Start: 2020-01-31 | End: 2020-02-06 | Stop reason: HOSPADM

## 2020-01-30 RX ORDER — ONDANSETRON 2 MG/ML
4 INJECTION INTRAMUSCULAR; INTRAVENOUS EVERY 6 HOURS PRN
Status: DISCONTINUED | OUTPATIENT
Start: 2020-01-30 | End: 2020-02-06 | Stop reason: HOSPADM

## 2020-01-30 RX ORDER — ATORVASTATIN CALCIUM 20 MG/1
20 TABLET, FILM COATED ORAL DAILY
Status: DISCONTINUED | OUTPATIENT
Start: 2020-01-31 | End: 2020-02-06 | Stop reason: HOSPADM

## 2020-01-30 RX ORDER — INSULIN GLARGINE 100 [IU]/ML
5 INJECTION, SOLUTION SUBCUTANEOUS NIGHTLY
Status: DISCONTINUED | OUTPATIENT
Start: 2020-01-30 | End: 2020-02-06 | Stop reason: HOSPADM

## 2020-01-30 RX ORDER — SODIUM CHLORIDE 0.9 % (FLUSH) 0.9 %
10 SYRINGE (ML) INJECTION PRN
Status: CANCELLED | OUTPATIENT
Start: 2020-01-30

## 2020-01-30 RX ORDER — SODIUM CHLORIDE 0.9 % (FLUSH) 0.9 %
10 SYRINGE (ML) INJECTION EVERY 12 HOURS SCHEDULED
Status: DISCONTINUED | OUTPATIENT
Start: 2020-01-30 | End: 2020-02-06 | Stop reason: HOSPADM

## 2020-01-30 RX ORDER — SODIUM CHLORIDE 0.9 % (FLUSH) 0.9 %
10 SYRINGE (ML) INJECTION EVERY 12 HOURS SCHEDULED
Status: CANCELLED | OUTPATIENT
Start: 2020-01-30

## 2020-01-30 RX ADMIN — CEFTRIAXONE SODIUM 1 G: 1 INJECTION, POWDER, FOR SOLUTION INTRAMUSCULAR; INTRAVENOUS at 13:08

## 2020-01-30 RX ADMIN — HALOPERIDOL LACTATE 5 MG: 5 INJECTION, SOLUTION INTRAMUSCULAR at 12:13

## 2020-01-30 RX ADMIN — METOPROLOL TARTRATE 50 MG: 50 TABLET, FILM COATED ORAL at 17:24

## 2020-01-30 RX ADMIN — AMLODIPINE BESYLATE 5 MG: 5 TABLET ORAL at 17:25

## 2020-01-30 RX ADMIN — SODIUM CHLORIDE 500 ML: 9 INJECTION, SOLUTION INTRAVENOUS at 18:11

## 2020-01-30 RX ADMIN — LEVOTHYROXINE SODIUM 100 MCG: 100 TABLET ORAL at 17:25

## 2020-01-30 RX ADMIN — SODIUM CHLORIDE 500 ML: 0.9 INJECTION, SOLUTION INTRAVENOUS at 10:25

## 2020-01-30 RX ADMIN — HEPARIN SODIUM 5000 UNITS: 5000 INJECTION INTRAVENOUS; SUBCUTANEOUS at 17:26

## 2020-01-30 RX ADMIN — LEVETIRACETAM 500 MG: 500 TABLET, FILM COATED ORAL at 17:24

## 2020-01-30 ASSESSMENT — PAIN SCALES - GENERAL
PAINLEVEL_OUTOF10: 0
PAINLEVEL_OUTOF10: 0

## 2020-01-30 NOTE — CARE COORDINATION
Case Management Initial Discharge Plan  Arielle West,             Met with:patient to discuss discharge plans. Information verified: address, contacts, phone number, , insurance Yes  PCP: Erendira Fuentes MD  Date of last visit:     Insurance Provider: Medicare/Medicaid    Discharge Planning    Living Arrangements:  Other (Comment)(nursing home)   Support Systems:  ECF/Assisted Living, Family Members    Home-ECF    Patient able to perform ADL's:Dependent    Current Services (outpatient & in home) N/A  DME equipment:   DME provider:       Potential Assistance Needed:  [facility]    Patient agreeable to home care: No  [place] of choice provided:  n/a    Prior SNF/Rehab Placement and Facility: [facility] to SNF/Rehab: Yes  [place] of choice provided: yes   Evaluation: n/a    Expected Discharge date:  20  Patient expects to be discharged to:  [facility]  Follow Up Appointment: Best Day/ Time:      Transportation provider: ambulance  Transportation arrangements needed for discharge: Yes    Readmission Risk              Risk of Unplanned Readmission:        26             Does patient have a readmission risk score greater than 14?: Yes  If yes, follow-up appointment must be made within 7 days of discharge.      Goals of Care: back to baseline      Discharge Plan: [facility]          Electronically signed by Niya Flaherty RN on 20 at 5:16 PM

## 2020-01-30 NOTE — ED PROVIDER NOTES
is no abdominal tenderness. Musculoskeletal:      Right lower leg: No edema. Left lower leg: No edema. Skin:     General: Skin is warm and dry. Findings: No rash. Neurological:      Comments: Patient is moving extremities however he does seem weak but does have known myopathy pt unreliable unable to answer questions about sensation but moves to stimuli   Psychiatric:         Mood and Affect: Mood normal.         Behavior: Behavior normal.           Comments    The patient presents for evaluation of altered mental status. Concerns that need urgent evaluation include:    Alcohol intoxications  Encephalopathies  Hypo or hyperglycemic issues or other electrolyte disturbance  Overdose  Uremia  Trauma  Infections  Poison  Stroke    Based on the presentation the patient is unlikely to suffer from EtOH as she is in a home wthout access to this. Possible metabolic encephalopathy from the high TSH and T4 or the UTI. The pt has no severe electrolyte abnormalities and no signs of OD in the RN home. The pt had no trauma and UTI is the most likely cause. No signs toxins or poison. Pt movng exts with no signs of a focal stroke. 1/30/20  12:58 PM  Called to room for confusion and dilerium pt having waxing waning episodes and she is not meeting sepsis criteria but has an infection will start antibiotics she has been given fluid but with worsening respiratory status with increased BNP cannot continue to bolus will watch pressures and ensure no hypotension. Pt not hypoxic and required halopiradol for aggitation. Arvizu DO, RDMS.   Attending Emergency Physician          Lonnie Baltazar DO  01/30/20 1302

## 2020-01-30 NOTE — H&P
Lymphatic ROS:  Completed and except as mentioned above were negative  Respiratory ROS:  Completed and except as mentioned above were negative  Cardiovascular ROS:  Completed and except as mentioned above were negative  Gastrointestinal ROS: Completed and except as mentioned above were negative  Genito-Urinary ROS:  Completed and except as mentioned above were negative  Musculoskeletal ROS:  Completed and except as mentioned above were negative  Neurological ROS:  Completed and except as mentioned above were negative  Skin & Dermatological ROS:  Completed and except as mentioned above were negative  Psychological ROS:  Completed and except as mentioned above were negative    PAST MEDICAL HISTORY     Past Medical History:   Diagnosis Date    [facility]     Anxiety     AR[facility] renal failure) [facility]     Arthritis     CAD (coronary artery disease)     Cancer of tonsil [facility]     Colon polyp     COPD [facility]chronic obstructive pulmonary disease) [facility]     Dehydration     Depression     Diabetes mellitus type 2, controlled ([facility]     DJD (degenerative joint disease)     Encephalopathy     Examination of participant in clinical trial 01/02/2015    Study completed 02/04/2015    GERD (gastroesophageal reflux disease)     Guaiac positive stools     Herpes     Hyperkalemia     Hyperplastic polyp of stomach     Hypertension     Hypothyroid     Multiple gastric polyps     Obesity     Positive FIT (fecal immunochemical test)     Seizure (HCC)     Tubular adenoma of colon     Unspecified cerebral artery occlusion with cerebral infarction     Unspecified diseases of blood and blood-forming organs     Blood infections       PAST SURGICAL HISTORY     Past Surgical History:   Procedure Laterality Date    APPENDECTOMY      CHOLECYSTECTOMY      COLONOSCOPY  03/20/2017    ONE 6 mm cecal polyp and few diverticula of L colon - path: tubular adenoma    HYSTERECTOMY      NV COLONOSCOPY W/BIOPSY SINGLE/MULTIPLE N/A 3/20/2017    COLONOSCOPY with cold biopsy and photos performed by Lu Trinidad MD at [address] [facility] EGD TRANSORAL BIOPSY SINGLE/MULTIPLE N/A 3/20/2017    EGD ESOPHAGOGASTRODUODENOSCOPY with cold biopsy and photos performed by Lu Trinidad MD at [address] [facility]  03/20/2017    3 gastric polyps (4-6 mm) polyps were erythematous and edematous with superficial hemorrhage. PATH: hyperplastic polyp       ALLERGIES     Dye [iodides]; Ioxaglate; Vancomycin; Ciprofloxacin; Eggs or egg-derived products; Mushroom extract complex; and Sulfa antibiotics    MEDICATIONS PRIOR TO ADMISSION     Prior to Admission medications    Medication Sig Start Date End Date Taking?  Authorizing Provider   guaiFENesin (MUCINEX) 600 MG extended release tablet Take 1,200 mg by mouth 2 times daily    Historical Provider, MD   losartan (COZAAR) 25 MG tablet Take 25 mg by mouth daily    Historical Provider, MD   ipratropium-albuterol (DUONEB) 0.5-2.5 (3) MG/3ML SOLN nebulizer solution Inhale 1 vial into the lungs every 4 hours as needed for Shortness of Breath or Other (wheezing)    Historical Provider, MD   potassium chloride (MICRO-K) 10 MEQ extended release capsule Take 10 mEq by mouth 2 times daily    Historical Provider, MD   furosemide (LASIX) 40 MG tablet Take 40 mg by mouth daily    Historical Provider, MD   meclizine (ANTIVERT) 25 MG tablet Take 25 mg by mouth 3 times daily as needed for Dizziness    Historical Provider, MD   omeprazole (PRILOSEC) 20 MG delayed release capsule Take 20 mg by mouth Daily    Historical Provider, MD   Estradiol 10 % CREA by Each Nostril route nightly For hormone therapy    Historical Provider, MD   ferrous sulfate 325 (65 Fe) MG tablet Take 325 mg by mouth 2 times daily    Historical Provider, MD   loperamide (IMODIUM) 2 MG capsule Take 2 mg by mouth every 6 hours as needed for Diarrhea    Historical Provider, MD   Calcium Carb-Cholecalciferol (CALTRATE 600+D) 600-800 MG-UNIT TABS Take 1 tablet by mouth daily    Historical Provider, MD   insulin aspart (NOVOLOG) 100 UNIT/ML injection vial Inject 12 Units into the skin Daily with supper     Historical Provider, MD   Dulaglutide (TRULICITY) 1.5 GF/9.6YG SOPN Inject 1 Dose into the skin once a week fridays    Historical Provider, MD   aspirin 81 MG EC tablet Take 1 tablet by mouth daily. 12/27/14   Erick Priest MD   metoprolol (LOPRESSOR) 50 MG tablet Take 1 tablet by mouth 2 times daily. 12/27/14   Erick Priest MD   atorvastatin (LIPITOR) 20 MG tablet Take 20 mg by mouth daily. Historical Provider, MD   cetirizine (ZYRTEC) 10 MG tablet Take 10 mg by mouth daily. Historical Provider, MD   citalopram (CELEXA) 20 MG tablet Take 20 mg by mouth daily. Historical Provider, MD   fluticasone (FLONASE) 50 MCG/ACT nasal spray 2 sprays by Nasal route daily. Historical Provider, MD   Insulin Aspart (NOVOLOG SC) Inject 6 Units into the skin 2 times daily Indications: Sliding scale Breakfast and lunch    Historical Provider, MD   levETIRAcetam (KEPPRA) 500 MG tablet Take 500 mg by mouth 2 times daily. Historical Provider, MD   levothyroxine (SYNTHROID) 100 MCG tablet Take 100 mcg by mouth Daily. Historical Provider, MD   Multiple Vitamins-Minerals (MULTIVITAMIN PO) Take 1 tablet by mouth daily. Historical Provider, MD   mupirocin (BACTROBAN) 2 % ointment Apply topically 2 times daily Apply to nares topically two times a day for nasal dryness. Historical Provider, MD   cycloSPORINE (RESTASIS) 0.05 % ophthalmic emulsion Place 1 drop into both eyes 2 times daily. Historical Provider, MD   bisacodyl (DULCOLAX) 10 MG suppository Place 10 mg rectally daily as needed for Constipation.  Indications: for constipation    Historical Provider, MD   diphenhydrAMINE (BENADRYL) 25 MG capsule Take 25 mg by mouth every 6 hours as needed for Itching or Sleep     Historical Provider, MODERATE (A) NEGATIVE    Specific Gravity, UA 1.020 1.005 - 1.030    Urine Hgb NEGATIVE NEGATIVE    pH, UA 5.5 5.0 - 8.0    Protein, UA 1+ (A) NEGATIVE    Urobilinogen, Urine Normal Normal    Nitrite, Urine NEGATIVE NEGATIVE    Leukocyte Esterase, Urine SMALL (A) NEGATIVE    -          WBC, UA 10 TO 20 0 - 5 /HPF    RBC, UA 2 TO 5 0 - 4 /HPF    Casts UA  0 - 8 /LPF     10 TO 20 HYALINE Reference range defined for non-centrifuged specimen. Crystals UA NOT REPORTED None /HPF    Epithelial Cells UA 20 TO 50 0 - 5 /HPF    Renal Epithelial, Urine NOT REPORTED 0 /HPF    Bacteria, UA NOT REPORTED None    Mucus, UA NOT REPORTED None    Trichomonas, UA NOT REPORTED None    Amorphous, UA NOT REPORTED None    Other Observations UA NOT REPORTED NOT REQ. Yeast, UA NOT REPORTED None       Imaging:   Xr Chest Standard (2 Vw)    Result Date: 1/30/2020  No acute cardiopulmonary process. Ct Head Wo Contrast    Result Date: 1/30/2020  No acute intracranial abnormality. No intracranial hemorrhage, mass effect, or midline shift. Extensive chronic small vessel disease. Ct Chest Wo Contrast    Result Date: 1/30/2020  Interval development of nonspecific diffuse mosaic attenuation of the lungs. This could be due to constrictive bronchiolitis, hypersensitivity pneumonitis or related to pulmonary arterial hypertension. No new lung consolidation or infiltrate. Us Renal Complete    Result Date: 1/25/2020  No evidence of hydronephrosis. Asymmetric size of the left kidney could suggest underlying atrophy. ASSESSMENT & PLAN     IMPRESSION  This is a 66 y.o. female who presented with altered mentation admitted to inpatient status for further management. 1. Acute metabolic encephalopathy: Obtain baseline mentation history from extended care facility. N.p.o. for now. Bedside swallow study. Follow-up EEG. Neurovascular checks every 4 hours. Avoid sedatives, analgesics.   2. Hypothyroidism: Elevated TSH, T4.  Monitor for

## 2020-01-30 NOTE — ED NOTES
Pt to ER via [facility], pt sent from [facility] for [address]. Pt recently admitted at [facility] for LEE and pneumonia. Has h/o herpes encephalitis. Pt arrives alert and disoriented x2. Pt answering some questions and is following commands. Pt denies any pain. Placed on full continuous monitor, no acute distress noted, rr even and NL.       Dolly Mccrary RN  01/30/20 1814

## 2020-01-30 NOTE — ED PROVIDER NOTES
Ioxaglate; Vancomycin; Ciprofloxacin; Eggs or egg-derived products; Mushroom extract complex; and Sulfa antibiotics    Home Medications:  Prior to Admission medications    Medication Sig Start Date End Date Taking? Authorizing Provider   guaiFENesin (MUCINEX) 600 MG extended release tablet Take 1,200 mg by mouth 2 times daily    Historical Provider, MD   losartan (COZAAR) 25 MG tablet Take 25 mg by mouth daily    Historical Provider, MD   ipratropium-albuterol (DUONEB) 0.5-2.5 (3) MG/3ML SOLN nebulizer solution Inhale 1 vial into the lungs every 4 hours as needed for Shortness of Breath or Other (wheezing)    Historical Provider, MD   potassium chloride (MICRO-K) 10 MEQ extended release capsule Take 10 mEq by mouth 2 times daily    Historical Provider, MD   furosemide (LASIX) 40 MG tablet Take 40 mg by mouth daily    Historical Provider, MD   meclizine (ANTIVERT) 25 MG tablet Take 25 mg by mouth 3 times daily as needed for Dizziness    Historical Provider, MD   omeprazole (PRILOSEC) 20 MG delayed release capsule Take 20 mg by mouth Daily    Historical Provider, MD   Estradiol 10 % CREA by Each Nostril route nightly For hormone therapy    Historical Provider, MD   ferrous sulfate 325 (65 Fe) MG tablet Take 325 mg by mouth 2 times daily    Historical Provider, MD   loperamide (IMODIUM) 2 MG capsule Take 2 mg by mouth every 6 hours as needed for Diarrhea    Historical Provider, MD   Calcium Carb-Cholecalciferol (CALTRATE 600+D) 600-800 MG-UNIT TABS Take 1 tablet by mouth daily    Historical Provider, MD   insulin aspart (NOVOLOG) 100 UNIT/ML injection vial Inject 12 Units into the skin Daily with supper     Historical Provider, MD   Dulaglutide (TRULICITY) 1.5 QM/6.7GD SOPN Inject 1 Dose into the skin once a week fridays    Historical Provider, MD   aspirin 81 MG EC tablet Take 1 tablet by mouth daily. 12/27/14   Jameson Martinez MD   metoprolol (LOPRESSOR) 50 MG tablet Take 1 tablet by mouth 2 times daily.  12/27/14 152/95 97.7 °F (36.5 °C) Oral 79 14 93 % -- --       Physical Exam  Vitals signs and nursing note reviewed. Constitutional:       General: She is not in acute distress. Appearance: She is well-developed. She is ill-appearing. HENT:      Head: Normocephalic and atraumatic. Right Ear: Tympanic membrane and ear canal normal.      Left Ear: Tympanic membrane and ear canal normal.      Mouth/Throat:      Mouth: Mucous membranes are dry. Eyes:      Pupils: Pupils are equal, round, and reactive to light. Cardiovascular:      Rate and Rhythm: Normal rate and regular rhythm. Pulses:           Carotid pulses are 2+ on the right side and 2+ on the left side. Radial pulses are 2+ on the right side and 2+ on the left side. Heart sounds: Normal heart sounds. No murmur. Pulmonary:      Effort: Pulmonary effort is normal. No respiratory distress. Breath sounds: Normal breath sounds. No decreased breath sounds or rhonchi. Abdominal:      General: Bowel sounds are decreased. There is no distension. Palpations: Abdomen is soft. Tenderness: There is no abdominal tenderness. Musculoskeletal:         General: No swelling or tenderness. Right lower le+ Edema present. Left lower le+ Edema present. Skin:     General: Skin is warm and dry. Capillary Refill: Capillary refill takes less than 2 seconds. Neurological:      Mental Status: She is alert. She is confused. GCS: GCS eye subscore is 4. GCS verbal subscore is 4. GCS motor subscore is 6. Cranial Nerves: Cranial nerves are intact. No cranial nerve deficit or dysarthria. Sensory: Sensation is intact. Motor: Weakness present. No tremor. Psychiatric:         Behavior: Behavior is cooperative.          DIFFERENTIAL  DIAGNOSIS   PLAN (LABS / IMAGING / EKG):  Orders Placed This Encounter   Procedures    Urine Culture    Culture blood #2    Culture blood #1    XR CHEST STANDARD (2 VW)    CT HEAD WO CONTRAST    CT CHEST WO CONTRAST    CBC Auto Differential    Basic Metabolic Panel w/ Reflex to MG    Hepatic Function Panel    Troponin    Brain Natriuretic Peptide    TSH with Reflex    Urinalysis with microscopic    AMMONIA    Lactic Acid, Plasma    Levetiracetam Level    T4, Free    Troponin    Straight [facility] Inpatient consult to Internal Medicine    POCT Glucose    POC Glucose Fingerstick    EKG 12 Lead    PATIENT STATUS (FROM ED OR OR/PROCEDURAL) Inpatient       MEDICATIONS ORDERED:  Orders Placed This Encounter   Medications    0.9 % sodium chloride bolus    haloperidol lactate (HALDOL) injection 5 mg    cefTRIAXone (ROCEPHIN) 1 g IVPB in 50 mL D5W minibag    DISCONTD: haloperidol lactate (HALDOL) injection 5 mg    DISCONTD: ziprasidone (GEODON) injection 10 mg    DISCONTD: sterile water injection 1.2 mL         DIAGNOSTIC RESULTS / EMERGENCYDEPARTMENT COURSE / MDM   LABS:  Labs Reviewed   CBC WITH AUTO DIFFERENTIAL - Abnormal; Notable for the following components:       Result Value    WBC 12.4 (*)     Hemoglobin 11.8 (*)     Seg Neutrophils 69 (*)     Lymphocytes 17 (*)     Immature Granulocytes 3 (*)     Segs Absolute 8.65 (*)     Absolute Immature Granulocyte 0.36 (*)     All other components within normal limits   BASIC METABOLIC PANEL W/ REFLEX TO MG FOR LOW K - Abnormal; Notable for the following components:    Glucose 127 (*)     CREATININE 1.17 (*)     GFR Non- 45 (*)     GFR  55 (*)     All other components within normal limits   HEPATIC FUNCTION PANEL - Abnormal; Notable for the following components:    Alb 3.4 (*)     All other components within normal limits   TROPONIN - Abnormal; Notable for the following components:    Troponin, High Sensitivity 32 (*)     All other components within normal limits   BRAIN NATRIURETIC PEPTIDE - Abnormal; Notable for the following components:    Pro-BNP 3,422 (*)     All other components within TECHNOLOGIST PROVIDED HISTORY: ams Reason for Exam: ams FINDINGS: BRAIN/VENTRICLES: No acute cranial hemorrhage, mass effect, or midline shift. Cortical and cerebellar involutional change identified with compensatory widening of the subarachnoid spaces. Prominent subdural spaces within the frontal lobes bilaterally, unchanged from multiple previous studies. Diffuse subcortical and periventricular white matter hypodensities are most compatible with chronic small vessel disease. ORBITS: The visualized portion of the orbits demonstrate no acute abnormality. SINUSES: Ill-defined debris and peripheral mucosal thickening of the sphenoid sinus, otherwise the paranasal sinuses mastoids are clear. SOFT TISSUES/SKULL:  No acute abnormality of the visualized skull or soft tissues. No acute intracranial abnormality. No intracranial hemorrhage, mass effect, or midline shift. Extensive chronic small vessel disease. Ct Chest Wo Contrast    Result Date: 1/30/2020  EXAMINATION: CT OF THE CHEST WITHOUT CONTRAST 1/30/2020 11:03 am TECHNIQUE: CT of the chest was performed without the administration of intravenous contrast. Multiplanar reformatted images are provided for review. Dose modulation, iterative reconstruction, and/or weight based adjustment of the mA/kV was utilized to reduce the radiation dose to as low as reasonably achievable. COMPARISON: January 22, 2020; June 27, 2019 HISTORY: ORDERING SYSTEM PROVIDED HISTORY: concern for occult pna TECHNOLOGIST PROVIDED HISTORY: concern for occult pna FINDINGS: Mediastinum: No enlarged thoracic lymph node by CT criteria. No pericardial effusion. Lungs/pleura: Right upper and right lower lobe pleuroparenchymal scarring redemonstrated. There is no new lung consolidation or infiltrate. There is interval development of diffuse mosaic attenuation of the lungs which is nonspecific. Mild bronchiectasis is unchanged. No pleural effusion or pneumothorax.  Upper Abdomen: Tiny hiatus hernia. The gallbladder is surgically absent. Soft Tissues/Bones: No acute osseous abnormality. Interval development of nonspecific diffuse mosaic attenuation of the lungs. This could be due to constrictive bronchiolitis, hypersensitivity pneumonitis or related to pulmonary arterial hypertension. No new lung consolidation or infiltrate. EKG    EKG Interpretation    EKG Interpretation    Interpreted by me at 0 848    Rhythm: normal sinus   Rate: normal, 79  Axis: normal  Ectopy: none  Conduction: normal  ST Segments: no acute change  T Waves: no acute change  Q Waves: none    Clinical Impression: Sinus rhythm rate of 79. Considerable artifact. No obvious ST segment changes, no arrhythmia, no ectopy. Criteria for right ventricular hypertrophy seen. Impression:  Patient presents with altered mental status. Appears to be not at her baseline. Will obtain CT head. Will also obtain extensive lab work. Note that patient is taking Keppra and levothyroxine. Will check Keppra level as well as TSH with reflex. We will also plan on checking CBC, troponin, BMP, LFTs, ammonia, and multiple other labs along with EKG. Recently treated for pneumonia, therefore will check chest x-ray, if chest raise negative however will likely need to obtain CT scan of the chest.  Likely admission. EMERGENCY DEPARTMENT COURSE:   Initial finger glucose within normal limits, Keppra level within normal limits. Ammonia normal, elevated BNP. Troponins 32×2. EKG essentially unchanged. CT head is negative. Inappropriately elevated TSH and T4. Patient is on levothyroxine supplement. Do have a slight concern for a TSH secreting growth or tumor. CT head was negative thankfully. Unsure if this is secondary to stress or inappropriate medication administration. Urine analysis did demonstrate 10-20 white blood cells with a second difficult number of epithelial cells.   After discussion with attending, decided to treat urinary tract infection anyway with 1 g of ceftriaxone. Patient did require 5 mg of IM Haldol secondary to agitation. Patient admitted to the internal medicine service. MDM  Number of Diagnoses or Management Options  Altered mental status, unspecified altered mental status type: new, needed workup  Dehydration: new, needed workup  Elevated serum free T4 level: new, needed workup  Low serum thyroid stimulating hormone (TSH): new, needed workup     Amount and/or Complexity of Data Reviewed  Clinical lab tests: ordered and reviewed  Tests in the radiology section of CPT®: ordered and reviewed  Review and summarize past medical records: yes  Discuss the patient with other providers: yes  Independent visualization of images, tracings, or specimens: yes    Risk of Complications, Morbidity, and/or Mortality  Presenting problems: moderate  Diagnostic procedures: moderate  Management options: moderate    Patient Progress  Patient progress: stable      PROCEDURES:  none    CONSULTS:  IP CONSULT TO INTERNAL MEDICINE  IP CONSULT TO CASE MANAGEMENT    CRITICAL CARE:  Please see attending note    FINAL IMPRESSION     1. Altered mental status, unspecified altered mental status type    2. Elevated serum free T4 level    3.  Low serum thyroid stimulating hormone (TSH)    4. Dehydration          DISPOSITION / PLAN   DISPOSITION Admitted 01/30/2020 11:56:36 AM      PATIENT REFERRED TO:  Jae Simpson, [address] [facility]  [phone]            DISCHARGE MEDICATIONS:  New Prescriptions    No medications on file       Tara Daniels DO  Emergency Medicine Resident Physician, PGY-1    (Please note that portions of this note were completed with a voice recognition program.  Efforts were made to edit the dictations but occasionally words are mis-transcribed.)         Tara Daniels MD  01/30/20 5450

## 2020-01-30 NOTE — LETTER
Beneficiary Notification Letter  BPCI Advanced     Your Doctor or [address],    We wanted to let you know that your health care provider, Zonia Zapata [facility], has volunteered to take part in our [facility] for [facility] & Medicaid Services (CMS) Bundled Payments for [facility] (BPCI Advanced). This doesnt change your Medicare rights or benefits and you dont need to do anything. What are bundled payments? A bundled payment combines, or bundles together, payments that Medicare makes to your health care providers for the many different kinds of medical services you might get in a specific time period. In BPCI Advanced, this time period could include a hospital inpatient stay or outpatient procedure, plus 90 days. Why would Medicare bundle payments? Bundled payments are thought of as a value-based way to pay because health care providers are responsible for both the quality and cost of medical care they give. This is a relatively new way of paying health care providers compared to thefee-for-service way Medicare has traditionally paid, where providers are paid separately for each service they provide. Bundled payments encourage these providers to work together to provide better, more coordinated care during your hospital stay, or outpatient procedure, and through your recovery. What does BPCI Advance mean for me? Youre more likely to get even better care when hospitals, doctors, and other health care providers work together. In BPCI Advanced, hospitals, doctors, and other health care providers may be rewarded for providing better, more coordinated health care. Medicare will watch BPCI Advanced participants closely to make sure that you and other patients keep getting efficient, high quality care. What do I need to know about BPCI Advanced? included under BPCI Advanced. A Clinical Episode is a grouping of medical conditions or diagnoses that are included in the [address] [facility].

## 2020-01-31 LAB
ABSOLUTE EOS #: 0.58 K/UL (ref 0–0.44)
ABSOLUTE IMMATURE GRANULOCYTE: 0.22 K/UL (ref 0–0.3)
ABSOLUTE LYMPH #: 1.54 K/UL (ref 1.1–3.7)
ABSOLUTE MONO #: 0.49 K/UL (ref 0.1–1.2)
ANION GAP SERPL CALCULATED.3IONS-SCNC: 13 MMOL/L (ref 9–17)
BASOPHILS # BLD: 1 % (ref 0–2)
BASOPHILS ABSOLUTE: 0.06 K/UL (ref 0–0.2)
BUN BLDV-MCNC: 18 MG/DL (ref 8–23)
BUN/CREAT BLD: ABNORMAL (ref 9–20)
CALCIUM SERPL-MCNC: 8.6 MG/DL (ref 8.6–10.4)
CHLORIDE BLD-SCNC: 106 MMOL/L (ref 98–107)
CO2: 24 MMOL/L (ref 20–31)
CREAT SERPL-MCNC: 1 MG/DL (ref 0.5–0.9)
DIFFERENTIAL TYPE: ABNORMAL
EKG ATRIAL RATE: 441 BPM
EKG Q-T INTERVAL: 422 MS
EKG QRS DURATION: 104 MS
EKG QTC CALCULATION (BAZETT): 483 MS
EKG R AXIS: 82 DEGREES
EKG T AXIS: 54 DEGREES
EKG VENTRICULAR RATE: 79 BPM
EOSINOPHILS RELATIVE PERCENT: 4 % (ref 1–4)
GFR AFRICAN AMERICAN: >60 ML/MIN
GFR NON-AFRICAN AMERICAN: 54 ML/MIN
GFR SERPL CREATININE-BSD FRML MDRD: ABNORMAL ML/MIN/{1.73_M2}
GFR SERPL CREATININE-BSD FRML MDRD: ABNORMAL ML/MIN/{1.73_M2}
GLUCOSE BLD-MCNC: 108 MG/DL (ref 65–105)
GLUCOSE BLD-MCNC: 111 MG/DL (ref 65–105)
GLUCOSE BLD-MCNC: 115 MG/DL (ref 65–105)
GLUCOSE BLD-MCNC: 121 MG/DL (ref 70–99)
GLUCOSE BLD-MCNC: 85 MG/DL (ref 65–105)
HCT VFR BLD CALC: 36.3 % (ref 36.3–47.1)
HEMOGLOBIN: 10.9 G/DL (ref 11.9–15.1)
IMMATURE GRANULOCYTES: 2 %
LYMPHOCYTES # BLD: 12 % (ref 24–43)
MCH RBC QN AUTO: 29.2 PG (ref 25.2–33.5)
MCHC RBC AUTO-ENTMCNC: 30 G/DL (ref 28.4–34.8)
MCV RBC AUTO: 97.3 FL (ref 82.6–102.9)
MONOCYTES # BLD: 4 % (ref 3–12)
NRBC AUTOMATED: 0 PER 100 WBC
PDW BLD-RTO: 13.8 % (ref 11.8–14.4)
PLATELET # BLD: 182 K/UL (ref 138–453)
PLATELET ESTIMATE: ABNORMAL
PMV BLD AUTO: 10.8 FL (ref 8.1–13.5)
POTASSIUM SERPL-SCNC: 4.2 MMOL/L (ref 3.7–5.3)
PROLACTIN: 61.47 UG/L (ref 4.79–23.3)
RBC # BLD: 3.73 M/UL (ref 3.95–5.11)
RBC # BLD: ABNORMAL 10*6/UL
SEG NEUTROPHILS: 77 % (ref 36–65)
SEGMENTED NEUTROPHILS ABSOLUTE COUNT: 10.39 K/UL (ref 1.5–8.1)
SODIUM BLD-SCNC: 143 MMOL/L (ref 135–144)
WBC # BLD: 13.3 K/UL (ref 3.5–11.3)
WBC # BLD: ABNORMAL 10*3/UL

## 2020-01-31 PROCEDURE — 6360000002 HC RX W HCPCS: Performed by: STUDENT IN AN ORGANIZED HEALTH CARE EDUCATION/TRAINING PROGRAM

## 2020-01-31 PROCEDURE — 99213 OFFICE O/P EST LOW 20 MIN: CPT

## 2020-01-31 PROCEDURE — 2700000000 HC OXYGEN THERAPY PER DAY

## 2020-01-31 PROCEDURE — 82947 ASSAY GLUCOSE BLOOD QUANT: CPT

## 2020-01-31 PROCEDURE — 99233 SBSQ HOSP IP/OBS HIGH 50: CPT | Performed by: INTERNAL MEDICINE

## 2020-01-31 PROCEDURE — 2580000003 HC RX 258: Performed by: STUDENT IN AN ORGANIZED HEALTH CARE EDUCATION/TRAINING PROGRAM

## 2020-01-31 PROCEDURE — 6370000000 HC RX 637 (ALT 250 FOR IP): Performed by: STUDENT IN AN ORGANIZED HEALTH CARE EDUCATION/TRAINING PROGRAM

## 2020-01-31 PROCEDURE — 94761 N-INVAS EAR/PLS OXIMETRY MLT: CPT

## 2020-01-31 PROCEDURE — 51798 US URINE CAPACITY MEASURE: CPT

## 2020-01-31 PROCEDURE — 36415 COLL VENOUS BLD VENIPUNCTURE: CPT

## 2020-01-31 PROCEDURE — 95816 EEG AWAKE AND DROWSY: CPT | Performed by: PSYCHIATRY & NEUROLOGY

## 2020-01-31 PROCEDURE — 80048 BASIC METABOLIC PNL TOTAL CA: CPT

## 2020-01-31 PROCEDURE — 93010 ELECTROCARDIOGRAM REPORT: CPT | Performed by: INTERNAL MEDICINE

## 2020-01-31 PROCEDURE — 1200000000 HC SEMI PRIVATE

## 2020-01-31 PROCEDURE — 85025 COMPLETE CBC W/AUTO DIFF WBC: CPT

## 2020-01-31 RX ORDER — LEVOFLOXACIN 750 MG/1
750 TABLET ORAL DAILY
Status: DISCONTINUED | OUTPATIENT
Start: 2020-01-31 | End: 2020-02-01

## 2020-01-31 RX ADMIN — HEPARIN SODIUM 5000 UNITS: 5000 INJECTION INTRAVENOUS; SUBCUTANEOUS at 16:15

## 2020-01-31 RX ADMIN — ATORVASTATIN CALCIUM 20 MG: 20 TABLET, FILM COATED ORAL at 09:34

## 2020-01-31 RX ADMIN — LEVETIRACETAM 500 MG: 500 TABLET, FILM COATED ORAL at 21:57

## 2020-01-31 RX ADMIN — METOPROLOL TARTRATE 50 MG: 50 TABLET, FILM COATED ORAL at 09:34

## 2020-01-31 RX ADMIN — HEPARIN SODIUM 5000 UNITS: 5000 INJECTION INTRAVENOUS; SUBCUTANEOUS at 21:57

## 2020-01-31 RX ADMIN — Medication 81 MG: at 09:34

## 2020-01-31 RX ADMIN — SODIUM CHLORIDE: 9 INJECTION, SOLUTION INTRAVENOUS at 00:09

## 2020-01-31 RX ADMIN — METOPROLOL TARTRATE 50 MG: 50 TABLET, FILM COATED ORAL at 21:57

## 2020-01-31 RX ADMIN — AMLODIPINE BESYLATE 5 MG: 5 TABLET ORAL at 09:34

## 2020-01-31 RX ADMIN — HEPARIN SODIUM 5000 UNITS: 5000 INJECTION INTRAVENOUS; SUBCUTANEOUS at 00:00

## 2020-01-31 RX ADMIN — HEPARIN SODIUM 5000 UNITS: 5000 INJECTION INTRAVENOUS; SUBCUTANEOUS at 06:05

## 2020-01-31 RX ADMIN — LEVETIRACETAM 500 MG: 500 TABLET, FILM COATED ORAL at 09:35

## 2020-01-31 RX ADMIN — ONDANSETRON 4 MG: 2 INJECTION INTRAMUSCULAR; INTRAVENOUS at 11:17

## 2020-01-31 RX ADMIN — LEVOTHYROXINE SODIUM 100 MCG: 100 TABLET ORAL at 09:34

## 2020-01-31 RX ADMIN — LEVOFLOXACIN 750 MG: 750 TABLET, FILM COATED ORAL at 16:14

## 2020-01-31 NOTE — PROCEDURES
EEG REPORT       Patient: Calixto Avina Age: 66 y.o. MRN: [id number]    Date: 1/30/2020  Referring Provider: No ref. provider found    History: This routine 30 minute scalp EEG was recorded with video- monitoring for a 66 y.o. Giles Gandhi female who presented with encephalopathy. This EEG was performed to evaluate for focal and epileptiform abnormalities.      Calixto Avina   Current Facility-Administered Medications   Medication Dose Route Frequency Provider Last Rate Last Dose    levofloxacin (LEVAQUIN) tablet 750 mg  750 mg Oral Daily Johnathan Bonner MD        sodium chloride flush 0.9 % injection 10 mL  10 mL Intravenous 2 times per day Harman Crain MD        sodium chloride flush 0.9 % injection 10 mL  10 mL Intravenous PRN Harman Crain MD        magnesium hydroxide (MILK OF MAGNESIA) 400 MG/5ML suspension 30 mL  30 mL Oral Daily PRN Harman Crain MD        ondansetron (ZOFRAN) injection 4 mg  4 mg Intravenous Q6H PRN Harman Crain MD        heparin (porcine) injection 5,000 Units  5,000 Units Subcutaneous 3 times per day Harman Crain MD   5,000 Units at 01/31/20 0605    aspirin EC tablet 81 mg  81 mg Oral Daily Harman Crain MD        atorvastatin (LIPITOR) tablet 20 mg  20 mg Oral Daily Harman Crain MD        [Held by provider] insulin glargine (LANTUS) injection vial 5 Units  5 Units Subcutaneous Nightly Harman Crain MD        insulin lispro (HUMALOG) injection vial 0-6 Units  0-6 Units Subcutaneous TID WC Harman Crain MD        insulin lispro (HUMALOG) injection vial 0-3 Units  0-3 Units Subcutaneous Nightly Harman Crain MD        glucose (GLUTOSE) 40 % oral gel 15 g  15 g Oral PRN Harman Crain MD        dextrose 50 % IV solution  12.5 g Intravenous PRN Harman Crain MD        glucagon (rDNA) injection 1 mg  1 mg Intramuscular PRN Harman Crain MD        dextrose 5 % solution  100 mL/hr Intravenous PRN Harman Crain MD

## 2020-01-31 NOTE — PROGRESS NOTES
[facility]  Internal Medicine Teaching Residency Program  Inpatient Daily Progress Note  ______________________________________________________________________________    Patient: Sofiya Baxter  YOB: 1945   [id number]:[id number]    Acct: [de-identified]     Room: 2020/2020-01  Admit date: 1/30/2020  Today's date: 01/30/20  Number of days in the hospital: 0    SUBJECTIVE   Admitting Diagnosis: Acute encephalopathy  CC: AMS  Pt examined at bedside. Chart & results reviewed. Passed bedside swallow study, resumed diet with aspiration precautions. B12, FA, Vit D normal. Held Lantus overnight due to low normal BG. VBG unremarkable. EEG was abnormal. Disorganized and slow background suggested   mild encephalopathy of non specific etiology.  No abnormal   epileptiform activity was seen. U cx growing non lactose fermenting gram negative rods, possible Pseudomonas. ROS:  Constitutional:  negative for chills, fevers, sweats  Respiratory:  negative for cough, dyspnea on exertion, hemoptysis, shortness of breath, wheezing  Cardiovascular:  negative for chest pain, chest pressure/discomfort, lower extremity edema, palpitations  Gastrointestinal:  negative for abdominal pain, constipation, diarrhea, nausea, vomiting  Neurological:  negative for dizziness, headache  BRIEF HISTORY   The patient is a pleasant 66 y.o. female presents with a chief complaint of persistent altered mentation. Patient was recently seen at [facility] and transported back to [address] [place] [address] at the facility staff noticed that patient never fully returned to baseline. Poor historian, unable to give any history. In the ED, vitals /72, HR 80s, afebrile, 2 L NC saturating 94%. BMP significant for CR 1.17, at baseline, , proBNP 3,400, troponin 32>32. TSH 8.51, free thyroxine 1.98 elevated. Keppra levels within normal limits. WBC trended down from last admission. Hemoglobin stable. UA cloudy, LE positive, few RBCs, with many epithelial cells. CXR unremarkable. CT chest without contrast:  No new consolidation or infiltrates. CT head unremarkable. Patient was given 1 dose IV Rocephin, 1 dose of IM Haldol, blood culture sent. 500 mL fluid bolus was given.     On our evaluation, pt alert, awake, not oriented x3. Patient did not have any slurred speech or difficulty speaking. But unable to answer or unable to comprehend most questions.  Patient denies any pain or any complaints specifically denies headache, chest pain, difficulty breathing, abdominal pain. OBJECTIVE     Vital Signs:  /63   Pulse 66   Temp 97.5 °F (36.4 °C) (Oral)   Resp 28   Ht 5' 2\" (1.575 m)   Wt 246 lb 11.1 oz (111.9 kg)   SpO2 97%   BMI 45.12 kg/m²     Temp (24hrs), Av.7 °F (36.5 °C), Min:97.5 °F (36.4 °C), Max:97.9 °F (36.6 °C)    No intake/output data recorded. Physical Exam:  Constitutional: This is a well developed, well nourished, Greater than 40 - Morbid Obesity / Extreme Obesity / Grade III 66y.o. year old female who is alert, oriented, cooperative and in no apparent distress. Head:normocephalic and atraumatic. EENT:  PERRLA. Septum was midline, mucosa was without erythema, exudates or cobblestoning. No thrush was noted. Neck: Supple without thyromegaly. No elevated JVP. Trachea was midline. Respiratory: Chest was symmetrical.  Breath sounds bilaterally were diminished to auscultation. There were no wheezes, rhonchi or rales. There is no intercostal retraction or use of accessory muscles. Cardiovascular: Regular without murmur, or clicks. No added sounds. Abdomen: Slightly rounded and soft without organomegaly. No rebound, rigidity or guarding was appreciated. Lymphatic: No lymphadenopathy. Musculoskeletal: Normal curvature of the spine. No gross muscle weakness. Extremities:  No lower extremity edema.  No ulcerations, tenderness, varicosities or

## 2020-01-31 NOTE — CARE COORDINATION
Transitional planning-called [facility] and talked with Stephanie-patient is a bedhold.

## 2020-01-31 NOTE — PROGRESS NOTES
This is a 66 y.o. female admitted 1/30/2020 for Altered mental status [R41.82]. See H&P of admitting/intern resident for more details. Patient with known medical history of recurrent UTIs was brought to the hospital by EMS from [facility] with altered mentation. Patient was recently admitted in [facility] similar complaints at that time she was diagnosed with UTI and was treated for urinary tract infection. On examination patient is alert but disoriented x3. She is able to understand the questions but is not able to give a proper answer. She appears confused as well. Labs patient has elevated WBC count of 12.4  Creatinine is at baseline 1.17,      BMP:   Recent Labs     01/30/20  0904 01/30/20  0923   NA  --  142   K  --  4.3   CL  --  100   CO2  --  27   BUN  --  18   CREATININE  --  1.17*   GLUCOSE 118 127*     CBC: )  Recent Labs     01/30/20  0923   WBC 12.4*   HGB 11.8*   HCT 38.4             Assessment    Active Problems:    Altered mental status  Resolved Problems:    * No resolved hospital problems.  *        Plan     Metabolic encephalopathy secondary to UTI  Start Rocephin  Send culture and sensitivity  Check bladder scan  Straight cath if needed, PVR more than 350    Patient baseline mentation is not clear    Will obtain EEG, keppra levels given seizure history    Gentle hydration  probnp elevated, will obtain ECHO    TSH of 8, patient is on Synthyroid supplementation at home, unsure if she was taking it, will resume home dose for now    Patient passed bedside swallow test.    H/o ANDREINA    Obtain VBG      Autumn Zhang MD            Department of Internal Medicine  [facility]         1/30/2020, 8:13 PM

## 2020-01-31 NOTE — FLOWSHEET NOTE
Patent turned and repositioned. Air mattress applied to bed. External female catheter in place. Alethea care given. Mepilex to buttocks/  Patient has two small open areas to buttocks. Open area to right buttocks and left buttocks.

## 2020-01-31 NOTE — PROGRESS NOTES
Drainage Description Yellow   Odor None   Alethea-wound Assessment Dry; Intact; [place]   Pink%Wound Bed 100   Wound 01/31/20 Buttocks Right   Date First Assessed/Time First Assessed: 01/31/20 0824   Present on Hospital Admission: Yes  Location: Buttocks  Wound Location Orientation: Right   Wound Image    Wound Pressure Stage  2   Dressing Status Changed   Dressing Changed Changed/New   Dressing/Treatment Protective barrier  (zinc paste)   Wound Cleansed   (dimethicone cleansing cloth)   Dressing Change Due 01/31/20   Wound Length (cm) 4 cm   Wound Width (cm) 3 cm   Wound Depth (cm) 0.1 cm   Wound Surface Area (cm^2) 12 cm^2   Wound Volume (cm^3) 1.2 cm^3   Wound Assessment Pink;Red   Drainage Amount Scant   Drainage Description Serosanguinous   Odor None   Alethea-wound Assessment Dry; Intact   Pink%Wound Bed 10   Red%Wound Bed 90   Wound 01/31/20 Buttocks Left   Date First Assessed/Time First Assessed: 01/31/20 0824   Present on Hospital Admission: Yes  Location: Buttocks  Wound Location Orientation: Left   Wound Image    Wound Pressure Stage  2   Dressing Changed Changed/New   Dressing/Treatment Protective barrier   Wound Cleansed   (dimethicone cleansing cloth)   Dressing Change Due 01/31/20   Wound Length (cm) 8.5 cm   Wound Width (cm) 4 cm   Wound Depth (cm) 0.1 cm   Wound Surface Area (cm^2) 34 cm^2   Wound Volume (cm^3) 3.4 cm^3   Wound Assessment Pink;Fragile   Drainage Amount Scant   Drainage Description Serous   Odor None   Alethea-wound Assessment Dry; Intact; [place]   Pink%Wound Bed 100       Response to treatment:  Well tolerated by patient. Plan:     Plan of Care: Turn every 2 hours  Float heels off of bed with pillows under calves    Use lift sling and wedges  to offload sacral pressure and to reposition patient to minimize potential for shear injury. Routine incontinence care with incontinence barrier cloths and zinc oxide cream. Apply zinc oxide cream BID and prn incontinence to the buttock wounds.

## 2020-01-31 NOTE — PROGRESS NOTES
Physical Therapy  DATE: 2020    NAME: Antoni Avitia  MRN: [id number]   : 1945    Patient not seen this date for Physical Therapy due to:  [] Blood transfusion in progress  [] Hemodialysis  []  Patient Declined  [] Spine Precautions   [] Strict Bedrest  [] Surgery/ Procedure  [] Testing      [x] Other Pt is nonambulatory dependent patient at [facility] for 8 years. Pt is a bedhold and will be returning to [facility] upon discharge. PT is not appropriate. [] PT being discontinued at this time. Patient independent. No further needs. [] PT being discontinued at this time as the patient has been transferred to palliative care. No further needs.     Faith Michele, PT

## 2020-01-31 NOTE — PROGRESS NOTES
Occupational Therapy Not Seen Note    DATE: 2020  Name: Ruven Sicard  : 1945  MRN: [id number]    Patient not available for Occupational Therapy due to:    Pt dependent with functional mobility and functional tasks, dependent for ADLs. Pt is bedhold and does not require note to return to facility per case management.  Pt with no OT acute care needs at this time, no further acute care OT needs, will defer OT eval.    Next Scheduled Treatment: Defer OT evaluation    Electronically signed by MUNIR Polo on 2020 at 11:15 AM

## 2020-02-01 LAB
ABSOLUTE EOS #: 0.44 K/UL (ref 0–0.44)
ABSOLUTE IMMATURE GRANULOCYTE: 0.1 K/UL (ref 0–0.3)
ABSOLUTE LYMPH #: 1.24 K/UL (ref 1.1–3.7)
ABSOLUTE MONO #: 0.52 K/UL (ref 0.1–1.2)
ANION GAP SERPL CALCULATED.3IONS-SCNC: 13 MMOL/L (ref 9–17)
BASOPHILS # BLD: 1 % (ref 0–2)
BASOPHILS ABSOLUTE: 0.04 K/UL (ref 0–0.2)
BUN BLDV-MCNC: 14 MG/DL (ref 8–23)
BUN/CREAT BLD: ABNORMAL (ref 9–20)
CALCIUM SERPL-MCNC: 8.3 MG/DL (ref 8.6–10.4)
CHLORIDE BLD-SCNC: 106 MMOL/L (ref 98–107)
CO2: 23 MMOL/L (ref 20–31)
CREAT SERPL-MCNC: 0.92 MG/DL (ref 0.5–0.9)
CULTURE: ABNORMAL
DIFFERENTIAL TYPE: ABNORMAL
EOSINOPHILS RELATIVE PERCENT: 5 % (ref 1–4)
GFR AFRICAN AMERICAN: >60 ML/MIN
GFR NON-AFRICAN AMERICAN: 59 ML/MIN
GFR SERPL CREATININE-BSD FRML MDRD: ABNORMAL ML/MIN/{1.73_M2}
GFR SERPL CREATININE-BSD FRML MDRD: ABNORMAL ML/MIN/{1.73_M2}
GLUCOSE BLD-MCNC: 100 MG/DL (ref 65–105)
GLUCOSE BLD-MCNC: 106 MG/DL (ref 70–99)
GLUCOSE BLD-MCNC: 94 MG/DL (ref 65–105)
GLUCOSE BLD-MCNC: 94 MG/DL (ref 65–105)
GLUCOSE BLD-MCNC: 95 MG/DL (ref 65–105)
GLUCOSE BLD-MCNC: 96 MG/DL (ref 65–105)
HCT VFR BLD CALC: 34.7 % (ref 36.3–47.1)
HEMOGLOBIN: 10.4 G/DL (ref 11.9–15.1)
IMMATURE GRANULOCYTES: 1 %
LYMPHOCYTES # BLD: 15 % (ref 24–43)
Lab: ABNORMAL
MCH RBC QN AUTO: 29.2 PG (ref 25.2–33.5)
MCHC RBC AUTO-ENTMCNC: 30 G/DL (ref 28.4–34.8)
MCV RBC AUTO: 97.5 FL (ref 82.6–102.9)
MONOCYTES # BLD: 6 % (ref 3–12)
NRBC AUTOMATED: 0 PER 100 WBC
PDW BLD-RTO: 13.4 % (ref 11.8–14.4)
PLATELET # BLD: 186 K/UL (ref 138–453)
PLATELET ESTIMATE: ABNORMAL
PMV BLD AUTO: 10.8 FL (ref 8.1–13.5)
POTASSIUM SERPL-SCNC: 4.1 MMOL/L (ref 3.7–5.3)
RBC # BLD: 3.56 M/UL (ref 3.95–5.11)
RBC # BLD: ABNORMAL 10*6/UL
SEG NEUTROPHILS: 71 % (ref 36–65)
SEGMENTED NEUTROPHILS ABSOLUTE COUNT: 5.8 K/UL (ref 1.5–8.1)
SODIUM BLD-SCNC: 142 MMOL/L (ref 135–144)
SPECIMEN DESCRIPTION: ABNORMAL
WBC # BLD: 8.1 K/UL (ref 3.5–11.3)
WBC # BLD: ABNORMAL 10*3/UL

## 2020-02-01 PROCEDURE — 2580000003 HC RX 258: Performed by: STUDENT IN AN ORGANIZED HEALTH CARE EDUCATION/TRAINING PROGRAM

## 2020-02-01 PROCEDURE — 36415 COLL VENOUS BLD VENIPUNCTURE: CPT

## 2020-02-01 PROCEDURE — 82947 ASSAY GLUCOSE BLOOD QUANT: CPT

## 2020-02-01 PROCEDURE — 99222 1ST HOSP IP/OBS MODERATE 55: CPT | Performed by: INTERNAL MEDICINE

## 2020-02-01 PROCEDURE — 99232 SBSQ HOSP IP/OBS MODERATE 35: CPT | Performed by: INTERNAL MEDICINE

## 2020-02-01 PROCEDURE — 6360000002 HC RX W HCPCS: Performed by: STUDENT IN AN ORGANIZED HEALTH CARE EDUCATION/TRAINING PROGRAM

## 2020-02-01 PROCEDURE — 85025 COMPLETE CBC W/AUTO DIFF WBC: CPT

## 2020-02-01 PROCEDURE — 94761 N-INVAS EAR/PLS OXIMETRY MLT: CPT

## 2020-02-01 PROCEDURE — 6370000000 HC RX 637 (ALT 250 FOR IP): Performed by: STUDENT IN AN ORGANIZED HEALTH CARE EDUCATION/TRAINING PROGRAM

## 2020-02-01 PROCEDURE — 2700000000 HC OXYGEN THERAPY PER DAY

## 2020-02-01 PROCEDURE — 51798 US URINE CAPACITY MEASURE: CPT

## 2020-02-01 PROCEDURE — 80048 BASIC METABOLIC PNL TOTAL CA: CPT

## 2020-02-01 PROCEDURE — 1200000000 HC SEMI PRIVATE

## 2020-02-01 RX ORDER — UREA 10 %
3 LOTION (ML) TOPICAL ONCE
Status: DISCONTINUED | OUTPATIENT
Start: 2020-02-01 | End: 2020-02-06 | Stop reason: HOSPADM

## 2020-02-01 RX ADMIN — MEROPENEM 1 G: 1 INJECTION, POWDER, FOR SOLUTION INTRAVENOUS at 09:37

## 2020-02-01 RX ADMIN — Medication 10 ML: at 09:40

## 2020-02-01 RX ADMIN — HEPARIN SODIUM 5000 UNITS: 5000 INJECTION INTRAVENOUS; SUBCUTANEOUS at 05:50

## 2020-02-01 RX ADMIN — ATORVASTATIN CALCIUM 20 MG: 20 TABLET, FILM COATED ORAL at 09:36

## 2020-02-01 RX ADMIN — HEPARIN SODIUM 5000 UNITS: 5000 INJECTION INTRAVENOUS; SUBCUTANEOUS at 13:47

## 2020-02-01 RX ADMIN — LEVOTHYROXINE SODIUM 100 MCG: 100 TABLET ORAL at 09:36

## 2020-02-01 RX ADMIN — Medication 81 MG: at 09:37

## 2020-02-01 RX ADMIN — HEPARIN SODIUM 5000 UNITS: 5000 INJECTION INTRAVENOUS; SUBCUTANEOUS at 22:53

## 2020-02-01 RX ADMIN — LEVETIRACETAM 500 MG: 500 TABLET, FILM COATED ORAL at 09:36

## 2020-02-01 RX ADMIN — AMLODIPINE BESYLATE 5 MG: 5 TABLET ORAL at 09:36

## 2020-02-01 RX ADMIN — METOPROLOL TARTRATE 50 MG: 50 TABLET, FILM COATED ORAL at 20:58

## 2020-02-01 RX ADMIN — METOPROLOL TARTRATE 50 MG: 50 TABLET, FILM COATED ORAL at 09:36

## 2020-02-01 RX ADMIN — ONDANSETRON 4 MG: 2 INJECTION INTRAMUSCULAR; INTRAVENOUS at 03:25

## 2020-02-01 RX ADMIN — LEVETIRACETAM 500 MG: 500 TABLET, FILM COATED ORAL at 20:58

## 2020-02-01 RX ADMIN — MEROPENEM 1 G: 1 INJECTION, POWDER, FOR SOLUTION INTRAVENOUS at 16:35

## 2020-02-01 ASSESSMENT — ENCOUNTER SYMPTOMS
COLOR CHANGE: 0
APNEA: 0
EYE DISCHARGE: 0
ABDOMINAL DISTENTION: 0

## 2020-02-01 NOTE — CONSULTS
SINGLE/MULTIPLE N/A 3/20/2017    EGD ESOPHAGOGASTRODUODENOSCOPY with cold biopsy and photos performed by Torri Lancaster MD at [facility]  03/20/2017    3 gastric polyps (4-6 mm) polyps were erythematous and edematous with superficial hemorrhage.  PATH: hyperplastic polyp       Medications:      meropenem  1 g Intravenous Q8H    sodium chloride flush  10 mL Intravenous 2 times per day    heparin (porcine)  5,000 Units Subcutaneous 3 times per day    aspirin  81 mg Oral Daily    atorvastatin  20 mg Oral Daily    [Held by provider] insulin glargine  5 Units Subcutaneous Nightly    insulin lispro  0-6 Units Subcutaneous TID WC    insulin lispro  0-3 Units Subcutaneous Nightly    levothyroxine  100 mcg Oral Daily    levETIRAcetam  500 mg Oral BID    metoprolol tartrate  50 mg Oral BID    amLODIPine  5 mg Oral Daily       Social History:     Social History     Socioeconomic History    Marital status:      Spouse name: Not on file    Number of children: Not on file    Years of education: Not on file    Highest education level: Not on file   Occupational History    Not on file   Social Needs    Financial resource strain: Not on file    Food insecurity:     Worry: Not on file     Inability: Not on file    Transportation needs:     Medical: Not on file     Non-medical: Not on file   Tobacco Use    Smoking status: Never Smoker    Smokeless tobacco: Never Used   Substance and Sexual Activity    Alcohol use: No    Drug use: No    Sexual activity: Never   Lifestyle    Physical activity:     Days per week: Not on file     Minutes per session: Not on file    Stress: Not on file   Relationships    Social connections:     Talks on phone: Not on file     Gets together: Not on file     Attends [employer] service: Not on file     Active member of club or organization: Not on file     Attends meetings of clubs or organizations: Not on file Relationship status: Not on file    Intimate partner violence:     Fear of current or ex partner: Not on file     Emotionally abused: Not on file     Physically abused: Not on file     Forced sexual activity: Not on file   Other Topics Concern    Not on file   Social History Narrative    Not on file       Family History:   History reviewed. No pertinent family history. Allergies:   Dye [iodides]; Ioxaglate; Vancomycin; Ciprofloxacin; Eggs or egg-derived products; Mushroom extract complex; and Sulfa antibiotics     Review of Systems:     Review of Systems   Constitutional: Positive for activity change. HENT: Negative for congestion. Eyes: Negative for discharge. Respiratory: Negative for apnea. Cardiovascular: Negative for chest pain. Gastrointestinal: Negative for abdominal distention. Genitourinary: Positive for pelvic pain. Negative for dysuria. Skin: Negative for color change. Hematological: Negative for adenopathy. Psychiatric/Behavioral: Positive for confusion. Physical Examination :     Patient Vitals for the past 8 hrs:   BP Temp Temp src Pulse Resp SpO2   02/01/20 1111 -- -- -- -- 23 99 %   02/01/20 1002 -- 98 °F (36.7 °C) Oral -- -- --   02/01/20 0936 (!) 186/61 -- -- 79 -- --       Physical Exam  Constitutional:       General: She is not in acute distress. Appearance: She is obese. She is not ill-appearing. HENT:      Head: Normocephalic and atraumatic. Nose: Nose normal. No congestion. Mouth/Throat:      Mouth: Mucous membranes are moist.      Pharynx: Oropharynx is clear. Eyes:      General: No scleral icterus. Extraocular Movements: Extraocular movements intact. Conjunctiva/sclera: Conjunctivae normal.      Pupils: Pupils are equal, round, and reactive to light. Neck:      Musculoskeletal: Neck supple. No neck rigidity. Cardiovascular:      Rate and Rhythm: Normal rate and regular rhythm. Heart sounds: Normal heart sounds. No murmur. Pulmonary:      Effort: No respiratory distress. Breath sounds: Normal breath sounds. Abdominal:      General: There is no distension. Palpations: Abdomen is soft. Tenderness: There is no abdominal tenderness. Genitourinary:     Comments: External urine cath in place - urine clear  Musculoskeletal:         General: Swelling present. No tenderness. Skin:     General: Skin is dry. Coloration: Skin is not jaundiced. Findings: No bruising. Neurological:      General: No focal deficit present. Mental Status: She is alert. She is disoriented. Cranial Nerves: No cranial nerve deficit. Psychiatric:         Mood and Affect: Mood normal.      Comments: confused           Medical Decision Making:   I have independently reviewed/ordered the following labs:    CBC with Differential:   Recent Labs     01/31/20  0450 02/01/20  0600   WBC 13.3* 8.1   HGB 10.9* 10.4*   HCT 36.3 34.7*    186   LYMPHOPCT 12* 15*   MONOPCT 4 6     BMP:  Recent Labs     01/31/20  0450 02/01/20  0600    142   K 4.2 4.1    106   CO2 24 23   BUN 18 14   CREATININE 1.00* 0.92*     Hepatic Function Panel:   Recent Labs     01/30/20  0923   PROT 6.7   LABALBU 3.4*   BILIDIR 0.17   IBILI 0.48   BILITOT 0.65   ALKPHOS 89   ALT 7   AST 15     No results for input(s): RPR in the last 72 hours. No results for input(s): HIV in the last 72 hours. No results for input(s): BC in the last 72 hours. Lab Results   Component Value Date    CREATININE 0.92 02/01/2020    GLUCOSE 106 02/01/2020       Detailed results: Thank you for allowing us to participate in the care of this patient. Please call with questions. This note is created with the assistance of a speech recognition program.  While intending to generate adocument that actually reflects the content of the visit, the document can still have some errors including those of syntax and sound a like substitutions which may escape proof reading.

## 2020-02-02 LAB
ABSOLUTE EOS #: 0.35 K/UL (ref 0–0.44)
ABSOLUTE IMMATURE GRANULOCYTE: 0.1 K/UL (ref 0–0.3)
ABSOLUTE LYMPH #: 1.87 K/UL (ref 1.1–3.7)
ABSOLUTE MONO #: 0.76 K/UL (ref 0.1–1.2)
ANION GAP SERPL CALCULATED.3IONS-SCNC: 14 MMOL/L (ref 9–17)
BASOPHILS # BLD: 1 % (ref 0–2)
BASOPHILS ABSOLUTE: 0.07 K/UL (ref 0–0.2)
BUN BLDV-MCNC: 13 MG/DL (ref 8–23)
BUN/CREAT BLD: ABNORMAL (ref 9–20)
CALCIUM SERPL-MCNC: 9 MG/DL (ref 8.6–10.4)
CHLORIDE BLD-SCNC: 104 MMOL/L (ref 98–107)
CO2: 22 MMOL/L (ref 20–31)
CREAT SERPL-MCNC: 0.88 MG/DL (ref 0.5–0.9)
DIFFERENTIAL TYPE: ABNORMAL
EOSINOPHILS RELATIVE PERCENT: 3 % (ref 1–4)
GFR AFRICAN AMERICAN: >60 ML/MIN
GFR NON-AFRICAN AMERICAN: >60 ML/MIN
GFR SERPL CREATININE-BSD FRML MDRD: ABNORMAL ML/MIN/{1.73_M2}
GFR SERPL CREATININE-BSD FRML MDRD: ABNORMAL ML/MIN/{1.73_M2}
GLUCOSE BLD-MCNC: 102 MG/DL (ref 70–99)
GLUCOSE BLD-MCNC: 106 MG/DL (ref 65–105)
GLUCOSE BLD-MCNC: 107 MG/DL (ref 65–105)
GLUCOSE BLD-MCNC: 88 MG/DL (ref 65–105)
GLUCOSE BLD-MCNC: 96 MG/DL (ref 65–105)
HCT VFR BLD CALC: 39.8 % (ref 36.3–47.1)
HEMOGLOBIN: 11.9 G/DL (ref 11.9–15.1)
IMMATURE GRANULOCYTES: 1 %
LYMPHOCYTES # BLD: 18 % (ref 24–43)
MCH RBC QN AUTO: 28.9 PG (ref 25.2–33.5)
MCHC RBC AUTO-ENTMCNC: 29.9 G/DL (ref 28.4–34.8)
MCV RBC AUTO: 96.6 FL (ref 82.6–102.9)
MONOCYTES # BLD: 7 % (ref 3–12)
NRBC AUTOMATED: 0 PER 100 WBC
PDW BLD-RTO: 13.3 % (ref 11.8–14.4)
PLATELET # BLD: 223 K/UL (ref 138–453)
PLATELET ESTIMATE: ABNORMAL
PMV BLD AUTO: 10.6 FL (ref 8.1–13.5)
POTASSIUM SERPL-SCNC: 4.1 MMOL/L (ref 3.7–5.3)
RBC # BLD: 4.12 M/UL (ref 3.95–5.11)
RBC # BLD: ABNORMAL 10*6/UL
SEG NEUTROPHILS: 70 % (ref 36–65)
SEGMENTED NEUTROPHILS ABSOLUTE COUNT: 7.28 K/UL (ref 1.5–8.1)
SODIUM BLD-SCNC: 140 MMOL/L (ref 135–144)
WBC # BLD: 10.4 K/UL (ref 3.5–11.3)
WBC # BLD: ABNORMAL 10*3/UL

## 2020-02-02 PROCEDURE — 6360000002 HC RX W HCPCS: Performed by: STUDENT IN AN ORGANIZED HEALTH CARE EDUCATION/TRAINING PROGRAM

## 2020-02-02 PROCEDURE — 6370000000 HC RX 637 (ALT 250 FOR IP): Performed by: STUDENT IN AN ORGANIZED HEALTH CARE EDUCATION/TRAINING PROGRAM

## 2020-02-02 PROCEDURE — 80048 BASIC METABOLIC PNL TOTAL CA: CPT

## 2020-02-02 PROCEDURE — 2580000003 HC RX 258: Performed by: STUDENT IN AN ORGANIZED HEALTH CARE EDUCATION/TRAINING PROGRAM

## 2020-02-02 PROCEDURE — 99232 SBSQ HOSP IP/OBS MODERATE 35: CPT | Performed by: INTERNAL MEDICINE

## 2020-02-02 PROCEDURE — 36415 COLL VENOUS BLD VENIPUNCTURE: CPT

## 2020-02-02 PROCEDURE — 82947 ASSAY GLUCOSE BLOOD QUANT: CPT

## 2020-02-02 PROCEDURE — 51798 US URINE CAPACITY MEASURE: CPT

## 2020-02-02 PROCEDURE — 1200000000 HC SEMI PRIVATE

## 2020-02-02 PROCEDURE — 85025 COMPLETE CBC W/AUTO DIFF WBC: CPT

## 2020-02-02 RX ORDER — SODIUM CHLORIDE 450 MG/100ML
INJECTION, SOLUTION INTRAVENOUS CONTINUOUS
Status: DISCONTINUED | OUTPATIENT
Start: 2020-02-02 | End: 2020-02-03

## 2020-02-02 RX ORDER — ACETAMINOPHEN 325 MG/1
650 TABLET ORAL EVERY 4 HOURS PRN
Status: DISCONTINUED | OUTPATIENT
Start: 2020-02-02 | End: 2020-02-06 | Stop reason: HOSPADM

## 2020-02-02 RX ADMIN — ACETAMINOPHEN 650 MG: 325 TABLET ORAL at 11:21

## 2020-02-02 RX ADMIN — Medication 81 MG: at 10:42

## 2020-02-02 RX ADMIN — ATORVASTATIN CALCIUM 20 MG: 20 TABLET, FILM COATED ORAL at 09:02

## 2020-02-02 RX ADMIN — METOPROLOL TARTRATE 50 MG: 50 TABLET, FILM COATED ORAL at 21:34

## 2020-02-02 RX ADMIN — LEVETIRACETAM 500 MG: 500 TABLET, FILM COATED ORAL at 21:34

## 2020-02-02 RX ADMIN — HEPARIN SODIUM 5000 UNITS: 5000 INJECTION INTRAVENOUS; SUBCUTANEOUS at 13:49

## 2020-02-02 RX ADMIN — HEPARIN SODIUM 5000 UNITS: 5000 INJECTION INTRAVENOUS; SUBCUTANEOUS at 21:34

## 2020-02-02 RX ADMIN — SODIUM CHLORIDE: 4.5 INJECTION, SOLUTION INTRAVENOUS at 17:37

## 2020-02-02 RX ADMIN — ONDANSETRON 4 MG: 2 INJECTION INTRAMUSCULAR; INTRAVENOUS at 11:18

## 2020-02-02 RX ADMIN — MEROPENEM 1 G: 1 INJECTION, POWDER, FOR SOLUTION INTRAVENOUS at 00:13

## 2020-02-02 RX ADMIN — MEROPENEM 1 G: 1 INJECTION, POWDER, FOR SOLUTION INTRAVENOUS at 14:48

## 2020-02-02 RX ADMIN — LEVETIRACETAM 500 MG: 500 TABLET, FILM COATED ORAL at 09:02

## 2020-02-02 RX ADMIN — METOPROLOL TARTRATE 50 MG: 50 TABLET, FILM COATED ORAL at 09:02

## 2020-02-02 RX ADMIN — HEPARIN SODIUM 5000 UNITS: 5000 INJECTION INTRAVENOUS; SUBCUTANEOUS at 06:19

## 2020-02-02 RX ADMIN — AMLODIPINE BESYLATE 5 MG: 5 TABLET ORAL at 11:18

## 2020-02-02 RX ADMIN — Medication 10 ML: at 09:03

## 2020-02-02 RX ADMIN — LEVOTHYROXINE SODIUM 100 MCG: 100 TABLET ORAL at 09:02

## 2020-02-02 RX ADMIN — MEROPENEM 1 G: 1 INJECTION, POWDER, FOR SOLUTION INTRAVENOUS at 08:46

## 2020-02-02 ASSESSMENT — PAIN SCALES - GENERAL
PAINLEVEL_OUTOF10: 0
PAINLEVEL_OUTOF10: 6

## 2020-02-02 ASSESSMENT — ENCOUNTER SYMPTOMS
BACK PAIN: 0
EYE DISCHARGE: 0
APNEA: 0
ABDOMINAL DISTENTION: 0
COLOR CHANGE: 0

## 2020-02-02 NOTE — PROGRESS NOTES
[facility]  Internal Medicine Teaching Residency Program  Inpatient Daily Progress Note  ______________________________________________________________________________    Patient: Dede Nicholas  YOB: 1945   TVB:[id number]    Acct: [de-identified]     Room: 2020/2020-01  Admit date: 1/30/2020  Today's date: 02/02/20  Number of days in the hospital: 3    SUBJECTIVE     Admitting Diagnosis: Acute encephalopathy    CC: AMS    Patient seen and examined at beside. Labs and chart reviewed. No acute overnight events. Patient resting comfortably in bed. Remains lethargic, is responsive but not alert and oriented to person or place. Remains on meropenem day 2 of 7 for ESBL UTI. Denies any CP ,SOB, HA, dysuria. Afebrile, hemodynamically stable. No new complaints. ROS:  Constitutional:  negative for chills, fevers, sweats  Respiratory:  negative for cough, dyspnea on exertion, hemoptysis, shortness of breath, wheezing  Cardiovascular:  negative for chest pain, chest pressure/discomfort, lower extremity edema, palpitations  Gastrointestinal:  negative for abdominal pain, constipation, diarrhea, nausea, vomiting  Neurological:  negative for dizziness, headache    BRIEF HISTORY     The patient is a pleasant 66 y.o. female presents with a chief complaint of persistent altered mentation. Patient was recently seen at [facility] and transported back to [facility] at the facility staff noticed that patient never fully returned to baseline. Poor historian, unable to give any history. In the ED, vitals /72, HR 80s, afebrile, 2 L NC saturating 94%. BMP significant for CR 1.17, at baseline, , proBNP 3,400, troponin 32>32. TSH 8.51, free thyroxine 1.98 elevated. Keppra levels within normal limits. WBC trended down from last admission. Hemoglobin stable. UA cloudy, LE positive, few RBCs, with many epithelial cells.   CXR

## 2020-02-02 NOTE — PROGRESS NOTES
PATIENT REFUSES TO WEAR BIPAP     [x] Risks and benefits explained to patient   [x] Patient refuses to wear Bipap stating \"No thanks, I'm fine. \"  [x] Patient verbalizes understanding of information presented.

## 2020-02-03 ENCOUNTER — APPOINTMENT (OUTPATIENT)
Dept: CT IMAGING | Age: 75
DRG: 871 | End: 2020-02-03
Payer: MEDICARE

## 2020-02-03 LAB
-: NORMAL
ABSOLUTE EOS #: 0.61 K/UL (ref 0–0.44)
ABSOLUTE IMMATURE GRANULOCYTE: 0.06 K/UL (ref 0–0.3)
ABSOLUTE LYMPH #: 1.71 K/UL (ref 1.1–3.7)
ABSOLUTE MONO #: 0.93 K/UL (ref 0.1–1.2)
ALLEN TEST: POSITIVE
AMMONIA: 14 UMOL/L (ref 11–51)
ANION GAP SERPL CALCULATED.3IONS-SCNC: 11 MMOL/L (ref 9–17)
BASOPHILS # BLD: 1 % (ref 0–2)
BASOPHILS ABSOLUTE: 0.06 K/UL (ref 0–0.2)
BUN BLDV-MCNC: 12 MG/DL (ref 8–23)
BUN/CREAT BLD: ABNORMAL (ref 9–20)
CALCIUM SERPL-MCNC: 8.6 MG/DL (ref 8.6–10.4)
CHLORIDE BLD-SCNC: 102 MMOL/L (ref 98–107)
CO2: 26 MMOL/L (ref 20–31)
CREAT SERPL-MCNC: 0.96 MG/DL (ref 0.5–0.9)
DIFFERENTIAL TYPE: ABNORMAL
EOSINOPHILS RELATIVE PERCENT: 7 % (ref 1–4)
FIO2: 2
GFR AFRICAN AMERICAN: >60 ML/MIN
GFR NON-AFRICAN AMERICAN: 57 ML/MIN
GFR SERPL CREATININE-BSD FRML MDRD: ABNORMAL ML/MIN/{1.73_M2}
GFR SERPL CREATININE-BSD FRML MDRD: ABNORMAL ML/MIN/{1.73_M2}
GLUCOSE BLD-MCNC: 109 MG/DL (ref 65–105)
GLUCOSE BLD-MCNC: 87 MG/DL (ref 65–105)
GLUCOSE BLD-MCNC: 92 MG/DL (ref 65–105)
GLUCOSE BLD-MCNC: 96 MG/DL (ref 70–99)
GLUCOSE BLD-MCNC: 98 MG/DL (ref 65–105)
HCT VFR BLD CALC: 35.4 % (ref 36.3–47.1)
HEMOGLOBIN: 11.2 G/DL (ref 11.9–15.1)
IMMATURE GRANULOCYTES: 1 %
LYMPHOCYTES # BLD: 19 % (ref 24–43)
MCH RBC QN AUTO: 29.1 PG (ref 25.2–33.5)
MCHC RBC AUTO-ENTMCNC: 31.6 G/DL (ref 28.4–34.8)
MCV RBC AUTO: 91.9 FL (ref 82.6–102.9)
MODE: ABNORMAL
MONOCYTES # BLD: 11 % (ref 3–12)
NEGATIVE BASE EXCESS, ART: ABNORMAL (ref 0–2)
NRBC AUTOMATED: 0 PER 100 WBC
O2 DEVICE/FLOW/%: ABNORMAL
PATIENT TEMP: ABNORMAL
PDW BLD-RTO: 13.5 % (ref 11.8–14.4)
PLATELET # BLD: 182 K/UL (ref 138–453)
PLATELET ESTIMATE: ABNORMAL
PMV BLD AUTO: 10.9 FL (ref 8.1–13.5)
POC HCO3: 31.6 MMOL/L (ref 21–28)
POC O2 SATURATION: 97 % (ref 94–98)
POC PCO2 TEMP: ABNORMAL MM HG
POC PCO2: 49.6 MM HG (ref 35–48)
POC PH TEMP: ABNORMAL
POC PH: 7.41 (ref 7.35–7.45)
POC PO2 TEMP: ABNORMAL MM HG
POC PO2: 90.2 MM HG (ref 83–108)
POSITIVE BASE EXCESS, ART: 6 (ref 0–3)
POTASSIUM SERPL-SCNC: 3.8 MMOL/L (ref 3.7–5.3)
RBC # BLD: 3.85 M/UL (ref 3.95–5.11)
RBC # BLD: ABNORMAL 10*6/UL
REASON FOR REJECTION: NORMAL
SAMPLE SITE: ABNORMAL
SEG NEUTROPHILS: 61 % (ref 36–65)
SEGMENTED NEUTROPHILS ABSOLUTE COUNT: 5.45 K/UL (ref 1.5–8.1)
SODIUM BLD-SCNC: 139 MMOL/L (ref 135–144)
TCO2 (CALC), ART: 33 MMOL/L (ref 22–29)
WBC # BLD: 8.8 K/UL (ref 3.5–11.3)
WBC # BLD: ABNORMAL 10*3/UL
ZZ NTE CLEAN UP: ORDERED TEST: NORMAL
ZZ NTE WITH NAME CLEAN UP: SPECIMEN SOURCE: NORMAL

## 2020-02-03 PROCEDURE — 85025 COMPLETE CBC W/AUTO DIFF WBC: CPT

## 2020-02-03 PROCEDURE — 6370000000 HC RX 637 (ALT 250 FOR IP): Performed by: STUDENT IN AN ORGANIZED HEALTH CARE EDUCATION/TRAINING PROGRAM

## 2020-02-03 PROCEDURE — 94761 N-INVAS EAR/PLS OXIMETRY MLT: CPT

## 2020-02-03 PROCEDURE — 2580000003 HC RX 258: Performed by: STUDENT IN AN ORGANIZED HEALTH CARE EDUCATION/TRAINING PROGRAM

## 2020-02-03 PROCEDURE — 6360000002 HC RX W HCPCS: Performed by: STUDENT IN AN ORGANIZED HEALTH CARE EDUCATION/TRAINING PROGRAM

## 2020-02-03 PROCEDURE — 36415 COLL VENOUS BLD VENIPUNCTURE: CPT

## 2020-02-03 PROCEDURE — 86140 C-REACTIVE PROTEIN: CPT

## 2020-02-03 PROCEDURE — 36600 WITHDRAWAL OF ARTERIAL BLOOD: CPT

## 2020-02-03 PROCEDURE — 82803 BLOOD GASES ANY COMBINATION: CPT

## 2020-02-03 PROCEDURE — 74176 CT ABD & PELVIS W/O CONTRAST: CPT

## 2020-02-03 PROCEDURE — 82947 ASSAY GLUCOSE BLOOD QUANT: CPT

## 2020-02-03 PROCEDURE — 1200000000 HC SEMI PRIVATE

## 2020-02-03 PROCEDURE — 99232 SBSQ HOSP IP/OBS MODERATE 35: CPT | Performed by: INTERNAL MEDICINE

## 2020-02-03 PROCEDURE — 82140 ASSAY OF AMMONIA: CPT

## 2020-02-03 PROCEDURE — 2700000000 HC OXYGEN THERAPY PER DAY

## 2020-02-03 PROCEDURE — 80048 BASIC METABOLIC PNL TOTAL CA: CPT

## 2020-02-03 RX ORDER — UREA 10 %
2 LOTION (ML) TOPICAL NIGHTLY PRN
Status: DISCONTINUED | OUTPATIENT
Start: 2020-02-03 | End: 2020-02-06 | Stop reason: HOSPADM

## 2020-02-03 RX ADMIN — METOPROLOL TARTRATE 50 MG: 50 TABLET, FILM COATED ORAL at 09:37

## 2020-02-03 RX ADMIN — Medication 2 MG: at 21:49

## 2020-02-03 RX ADMIN — LEVETIRACETAM 500 MG: 500 TABLET, FILM COATED ORAL at 09:36

## 2020-02-03 RX ADMIN — MEROPENEM 1 G: 1 INJECTION, POWDER, FOR SOLUTION INTRAVENOUS at 09:05

## 2020-02-03 RX ADMIN — AMLODIPINE BESYLATE 5 MG: 5 TABLET ORAL at 09:37

## 2020-02-03 RX ADMIN — LEVOTHYROXINE SODIUM 100 MCG: 100 TABLET ORAL at 09:37

## 2020-02-03 RX ADMIN — ONDANSETRON 4 MG: 2 INJECTION INTRAMUSCULAR; INTRAVENOUS at 11:53

## 2020-02-03 RX ADMIN — HEPARIN SODIUM 5000 UNITS: 5000 INJECTION INTRAVENOUS; SUBCUTANEOUS at 06:15

## 2020-02-03 RX ADMIN — METOPROLOL TARTRATE 50 MG: 50 TABLET, FILM COATED ORAL at 21:48

## 2020-02-03 RX ADMIN — Medication 10 ML: at 09:38

## 2020-02-03 RX ADMIN — ATORVASTATIN CALCIUM 20 MG: 20 TABLET, FILM COATED ORAL at 09:37

## 2020-02-03 RX ADMIN — HEPARIN SODIUM 5000 UNITS: 5000 INJECTION INTRAVENOUS; SUBCUTANEOUS at 13:54

## 2020-02-03 RX ADMIN — Medication 81 MG: at 09:37

## 2020-02-03 RX ADMIN — HEPARIN SODIUM 5000 UNITS: 5000 INJECTION INTRAVENOUS; SUBCUTANEOUS at 21:48

## 2020-02-03 RX ADMIN — MEROPENEM 1 G: 1 INJECTION, POWDER, FOR SOLUTION INTRAVENOUS at 02:01

## 2020-02-03 RX ADMIN — MEROPENEM 1 G: 1 INJECTION, POWDER, FOR SOLUTION INTRAVENOUS at 17:21

## 2020-02-03 RX ADMIN — LEVETIRACETAM 500 MG: 500 TABLET, FILM COATED ORAL at 21:48

## 2020-02-03 RX ADMIN — ACETAMINOPHEN 650 MG: 325 TABLET ORAL at 14:56

## 2020-02-03 ASSESSMENT — PAIN SCALES - GENERAL
PAINLEVEL_OUTOF10: 0
PAINLEVEL_OUTOF10: 0
PAINLEVEL_OUTOF10: 6

## 2020-02-03 ASSESSMENT — ENCOUNTER SYMPTOMS
ABDOMINAL DISTENTION: 0
EYE DISCHARGE: 1
APNEA: 0
COLOR CHANGE: 0
BACK PAIN: 0
ABDOMINAL PAIN: 0

## 2020-02-03 NOTE — PLAN OF CARE
Problem: Risk for Impaired Skin Integrity  Goal: Tissue integrity - skin and mucous membranes  Description  Structural intactness and normal physiological function of skin and  mucous membranes. Mepilex in place, barrier cream in place, q2h turns, floating of ankles. 2/3/2020 0237 by Samina Ramos RN  Outcome: Ongoing  2/2/2020 1953 by Josiah Smith RN  Outcome: Ongoing     Problem: Falls - Risk of:  Goal: Will remain free from falls  Description  Will remain free from falls. High fall protocols in place, bed alarm on, 3/4 side rails up, call light in reach.   2/3/2020 0237 by Samina Ramos RN  Outcome: Ongoing  2/2/2020 1953 by Josiah Smith RN  Outcome: Ongoing  Goal: Absence of physical injury  Description  Absence of physical injury  2/3/2020 0237 by Samina Ramos RN  Outcome: Ongoing  2/2/2020 1953 by Josiah Smith RN  Outcome: Ongoing

## 2020-02-03 NOTE — PROCEDURES
Please have pt or family fill out mri checklist and sign and fax to [phone]. Will do tomorrow.  thanks

## 2020-02-03 NOTE — PROGRESS NOTES
[facility] -   Infectious diseases evaluation  admission date 1/30/2020    reason for consultation:   UTI ESBL    Impression :   Current:  · complicated ESBL E COLI UTI  · Urine retention- neurogenic bladder  · leukocytosis  · Acute mental status changes PERSISTENT    Discussion / summary of stay / plan of care   · Presents w persistent mental status changes and leukocytosis - no fever  · U cx by SC ESBL E coli -    · Treated as a UTI but did not improve mentally yet  · c/o left flank pain - last US kid showed no hydro 1/25  ·   Recommendations   · Keep meropenem x 7 days, till 1/7/20  · Get CT AP wo contrast look for any stones and for constipation  · Defer contrast dye to the renal condition  · Case discussed with Dr. Fransisco Titus, he will get an MRI of the brain  · Doubt meropenem is causing the AMS because she came twice for this same AMS that never changed. · Measures also have been taken by medicine for possible CO2 narcosis  · keep SC on off as needed to empty the bladder  · Will follow - disc w Dr Fransisco Titus    Infection Control Recommendations   · [place] Precautions  · Contact Isolation       Antimicrobial Stewardship Recommendations   · Simplification of therapy  · Targeted therapy      Coordination ofOutpatient Care:   · Estimated Length of IV antimicrobials:  · Patient will need Midline / picc Catheter Insertion:   · Patient will need SNF:  · Patient will need outpatient wound care:     History of Present Illness:   Initial history:  Sofiya Baxter is a 66y.o.-year-old female recently seen at [address] [place] [address] w AMS and  KIMBER and disch back to [facility] - they noticed her mentation was not back to normal and sent her back to [facility]  Here was w AMS and some leukocytosis and urien retention where she does npt empty her bladder - she is on a q shift bladder scan and SC if urine > 200cc  U cx showed ESBL E coli and started on meropenem and ID consulted.   She looks better but is still session: Not on file    Stress: Not on file   Relationships    Social connections:     Talks on phone: Not on file     Gets together: Not on file     Attends [employer] service: Not on file     Active member of club or organization: Not on file     Attends meetings of clubs or organizations: Not on file     Relationship status: Not on file    Intimate partner violence:     Fear of current or ex partner: Not on file     Emotionally abused: Not on file     Physically abused: Not on file     Forced sexual activity: Not on file   Other Topics Concern    Not on file   Social History Narrative    Not on file       Family History:   History reviewed. No pertinent family history. Allergies:   Dye [iodides]; Ioxaglate; Vancomycin; Ciprofloxacin; Eggs or egg-derived products; Mushroom extract complex; and Sulfa antibiotics     Review of Systems:     Review of Systems   Constitutional: Positive for activity change. HENT: Negative for congestion. Eyes: Negative for discharge. Respiratory: Negative for apnea. Cardiovascular: Negative for chest pain. Gastrointestinal: Negative for abdominal distention. Endocrine: Negative for heat intolerance. Genitourinary: Positive for pelvic pain. Negative for dysuria. Musculoskeletal: Negative for arthralgias and back pain. Skin: Negative for color change. Allergic/Immunologic: Negative for immunocompromised state. Neurological: Negative for seizures. Hematological: Negative for adenopathy. Psychiatric/Behavioral: Negative for agitation and confusion.         Lethargy       Physical Examination :     Patient Vitals for the past 24 hrs:   BP Temp Temp src Pulse Resp SpO2   02/02/20 1100 (!) 145/67 98.6 °F (37 °C) Axillary 69 20 98 %   02/02/20 0902 -- -- -- 80 -- --   02/02/20 0830 (!) 161/80 98.2 °F (36.8 °C) Axillary -- -- --   02/01/20 2011 (!) 159/58 97.7 °F (36.5 °C) Oral 81 21 97 %         Physical Exam  Constitutional:       General: She is not in

## 2020-02-03 NOTE — PROGRESS NOTES
[facility] -   Infectious diseases evaluation  admission date 1/30/2020    reason for consultation:   UTI ESBL    Impression :   Current:  · complicated ESBL E COLI UTI  · Urine retention- neurogenic bladder  · leukocytosis  · Acute mental status changes PERSISTENT    Discussion / summary of stay / plan of care   · Presents w persistent mental status changes and leukocytosis - no fever  · U cx by SC ESBL E coli -    · Treated as a UTI but did not improve mentally  · c/o left flank pain -   · last US kid showed no hydro 1/25,   · CT showed stable atrophic left kidney, negative stones 2/3  · lethargy and confusion continues, seems to be relatively stable with minimal improvement  · CT brain neg  · Strongly doubt this is from the UTI, or from the meropenem  ·   Recommendations   · Keep meropenem x 7 days, till 1/7/20  · Get a CRP, if it is low, shorten the course to 5 days  · Case discussed with Dr. Anatoly Salazar, he will get an MRI of the brain, looking for other causes of mental status changes  · Doubt meropenem is causing the AMS because she came twice for this same AMS that never changed.   · keep SC on off as needed to empty the bladder  · Will follow - disc w Dr Anatoly Salazar    Infection Control Recommendations   · [place] Precautions  · Contact Isolation       Antimicrobial Stewardship Recommendations   · Simplification of therapy  · Targeted therapy      Coordination ofOutpatient Care:   · Estimated Length of IV antimicrobials:  · Patient will need Midline / picc Catheter Insertion:   · Patient will need SNF:  · Patient will need outpatient wound care:     History of Present Illness:   Initial history:  Pepe Kitchen is a 66y.o.-year-old female recently seen at [facility] w AMS and  KIMBER and disch back to [facility] - they noticed her mentation was not back to normal and sent her back to [facility]  Here was w AMS and some leukocytosis and urien retention where she does npt empty her bladder completed 02/04/2015    GERD (gastroesophageal reflux disease)     Guaiac positive stools     Herpes     Hyperkalemia     Hyperplastic polyp of stomach     Hypertension     Hypothyroid     Multiple gastric polyps     Obesity     Positive FIT (fecal immunochemical test)     Seizure (HCC)     Tubular adenoma of colon     Unspecified cerebral artery occlusion with cerebral infarction     Unspecified diseases of blood and blood-forming organs     Blood infections       Past Surgical  History:     Past Surgical History:   Procedure Laterality Date    APPENDECTOMY      CHOLECYSTECTOMY      COLONOSCOPY  03/20/2017    ONE 6 mm cecal polyp and few diverticula of L colon - path: tubular adenoma    HYSTERECTOMY      CT COLONOSCOPY W/BIOPSY SINGLE/MULTIPLE N/A 3/20/2017    COLONOSCOPY with cold biopsy and photos performed by Eliud Sanchez MD at [address] [place] [address] EGD TRANSORAL BIOPSY SINGLE/MULTIPLE N/A 3/20/2017    EGD ESOPHAGOGASTRODUODENOSCOPY with cold biopsy and photos performed by Eliud Sanchez MD at [facility]  03/20/2017    3 gastric polyps (4-6 mm) polyps were erythematous and edematous with superficial hemorrhage.  PATH: hyperplastic polyp       Medications:      meropenem  1 g Intravenous Q8H    melatonin  3 mg Oral Once    sodium chloride flush  10 mL Intravenous 2 times per day    heparin (porcine)  5,000 Units Subcutaneous 3 times per day    aspirin  81 mg Oral Daily    atorvastatin  20 mg Oral Daily    [Held by provider] insulin glargine  5 Units Subcutaneous Nightly    insulin lispro  0-6 Units Subcutaneous TID     insulin lispro  0-3 Units Subcutaneous Nightly    levothyroxine  100 mcg Oral Daily    levETIRAcetam  500 mg Oral BID    metoprolol tartrate  50 mg Oral BID    amLODIPine  5 mg Oral Daily       Social History:     Social History     Socioeconomic History    Marital status:      Spouse name: Not on file   

## 2020-02-03 NOTE — PROGRESS NOTES
head unremarkable. Patient was given 1 dose IV Rocephin, 1 dose of IM Haldol, blood culture sent. 500 mL fluid bolus was given.     On our evaluation, pt alert, awake, not oriented x3. Patient did not have any slurred speech or difficulty speaking. But unable to answer or unable to comprehend most questions.  Patient denies any pain or any complaints specifically denies headache, chest pain, difficulty breathing, abdominal pain. OBJECTIVE     Vital Signs:  BP (!) 138/50   Pulse 76   Temp 97.7 °F (36.5 °C) (Oral)   Resp 20   Ht 5' 2\" (1.575 m)   Wt 255 lb 15.3 oz (116.1 kg)   SpO2 98%   BMI 46.81 kg/m²     Temp (24hrs), Av °F (36.7 °C), Min:97.7 °F (36.5 °C), Max:98.3 °F (36.8 °C)    In: 2171.9   Out: 200 [Urine:200]    Physical Exam:  Constitutional: This is a well developed, well nourished, Greater than 40 - Morbid Obesity / Extreme Obesity / Grade III 66y.o. year old femalewho is alert, oriented, cooperative and in no apparent distress. Head: normocephalic and atraumatic. EENT:  PERRLA. Neck: Supple without thyromegaly. No elevated JVP. Trachea was midline. Respiratory: Chest was symmetrical.  Breath sounds bilaterally were diminished to auscultation. There were no wheezes, rhonchi or rales. There is no intercostal retraction or use of accessory muscles. Cardiovascular: Regular without murmur, or clicks. No added sounds. Abdomen: Slightly rounded and soft without organomegaly. No rebound, rigidity or guarding was appreciated. Extremities:  No lower extremity edema. No ulcerations, tenderness, varicosities or erythema. Skin:  Warm and dry. Neurological/Psychiatric: No gross motor or sensory deficits.     Medications:  Scheduled Medications:    meropenem  1 g Intravenous Q8H    melatonin  3 mg Oral Once    sodium chloride flush  10 mL Intravenous 2 times per day    heparin (porcine)  5,000 Units Subcutaneous 3 times per day    aspirin  81 mg Oral Daily    atorvastatin  20 mg Oral Daily    [Held by provider] insulin glargine  5 Units Subcutaneous Nightly    insulin lispro  0-6 Units Subcutaneous TID     insulin lispro  0-3 Units Subcutaneous Nightly    levothyroxine  100 mcg Oral Daily    levETIRAcetam  500 mg Oral BID    metoprolol tartrate  50 mg Oral BID    amLODIPine  5 mg Oral Daily     Continuous Infusions:    dextrose      sodium chloride 75 mL/hr at 02/03/20 0903     PRN Medicationsacetaminophen, 650 mg, Q4H PRN  sodium chloride flush, 10 mL, PRN  magnesium hydroxide, 30 mL, Daily PRN  ondansetron, 4 mg, Q6H PRN  glucose, 15 g, PRN  dextrose, 12.5 g, PRN  glucagon (rDNA), 1 mg, PRN  dextrose, 100 mL/hr, PRN      Diagnostic Labs:  CBC:   Recent Labs     02/01/20  0600 02/02/20  0447 02/03/20  0531   WBC 8.1 10.4 8.8   RBC 3.56* 4.12 3.85*   HGB 10.4* 11.9 11.2*   HCT 34.7* 39.8 35.4*   MCV 97.5 96.6 91.9   RDW 13.4 13.3 13.5    223 182     BMP:   Recent Labs     02/01/20  0600 02/02/20  0447 02/03/20  0531    140 139   K 4.1 4.1 3.8    104 102   CO2 23 22 26   BUN 14 13 12   CREATININE 0.92* 0.88 0.96*       ASSESSMENT & PLAN     1. Acute metabolic encephalopathy: Likely secondary to UTI. Avoid any psychiatric medications. Follow-up on MRI brain and CT abdomen pelvis. 2. Recurrent UTI: Urine culture positive for ESBL. Day 3 of meropenem 1 g every 8 hours. ID following. Patient will likely need midline placed prior to discharge. 3. Hypothyroidism: Continue home dose 100 mcg levothyroxine. 4. Type 2 Diabetes: Currently under control. Lantus 5 units nightly, low-dose sliding scale. 5. Essential hypertension: Currently low normal BP. Continue Norvasc 5 mg, lopressor 50 mg BID. 6. Seizure disorder: Keppra 500 mg twice daily. 7. CKD stage III: Currently in baseline of 1.3-1.5.  8. COPD: Currently no exacerbation. Continue breathing treatments. 9. Depression on Celexa. Hold for now. 10. Discharge Planning: Once acute issues resolve.  Possible return back to extended-care facility.        DVT ppx: Heparin 5000 units SC 3 times daily   GI ppx: Not indicated  PT/OT/SW consulted      Abilio Christiansen MD  Internal Medicine Resident, PGY-1  [address], [place]  2/3/2020, 4:24 PM

## 2020-02-04 LAB
ABSOLUTE EOS #: 0.83 K/UL (ref 0–0.44)
ABSOLUTE IMMATURE GRANULOCYTE: 0.04 K/UL (ref 0–0.3)
ABSOLUTE LYMPH #: 1.14 K/UL (ref 1.1–3.7)
ABSOLUTE MONO #: 0.64 K/UL (ref 0.1–1.2)
ANION GAP SERPL CALCULATED.3IONS-SCNC: 12 MMOL/L (ref 9–17)
BASOPHILS # BLD: 1 % (ref 0–2)
BASOPHILS ABSOLUTE: 0.04 K/UL (ref 0–0.2)
BUN BLDV-MCNC: 11 MG/DL (ref 8–23)
BUN/CREAT BLD: ABNORMAL (ref 9–20)
C-REACTIVE PROTEIN: 8.3 MG/L (ref 0–5)
C-REACTIVE PROTEIN: 8.7 MG/L (ref 0–5)
CALCIUM SERPL-MCNC: 8.4 MG/DL (ref 8.6–10.4)
CHLORIDE BLD-SCNC: 106 MMOL/L (ref 98–107)
CO2: 24 MMOL/L (ref 20–31)
CREAT SERPL-MCNC: 0.77 MG/DL (ref 0.5–0.9)
DIFFERENTIAL TYPE: ABNORMAL
EOSINOPHILS RELATIVE PERCENT: 13 % (ref 1–4)
GFR AFRICAN AMERICAN: >60 ML/MIN
GFR NON-AFRICAN AMERICAN: >60 ML/MIN
GFR SERPL CREATININE-BSD FRML MDRD: ABNORMAL ML/MIN/{1.73_M2}
GFR SERPL CREATININE-BSD FRML MDRD: ABNORMAL ML/MIN/{1.73_M2}
GLUCOSE BLD-MCNC: 101 MG/DL (ref 65–105)
GLUCOSE BLD-MCNC: 83 MG/DL (ref 65–105)
GLUCOSE BLD-MCNC: 87 MG/DL (ref 65–105)
GLUCOSE BLD-MCNC: 95 MG/DL (ref 65–105)
GLUCOSE BLD-MCNC: 98 MG/DL (ref 70–99)
HCT VFR BLD CALC: 33.6 % (ref 36.3–47.1)
HEMOGLOBIN: 10.1 G/DL (ref 11.9–15.1)
IMMATURE GRANULOCYTES: 1 %
LYMPHOCYTES # BLD: 18 % (ref 24–43)
MCH RBC QN AUTO: 29.5 PG (ref 25.2–33.5)
MCHC RBC AUTO-ENTMCNC: 30.1 G/DL (ref 28.4–34.8)
MCV RBC AUTO: 98.2 FL (ref 82.6–102.9)
MONOCYTES # BLD: 10 % (ref 3–12)
NRBC AUTOMATED: 0 PER 100 WBC
PDW BLD-RTO: 13.6 % (ref 11.8–14.4)
PLATELET # BLD: 169 K/UL (ref 138–453)
PLATELET ESTIMATE: ABNORMAL
PMV BLD AUTO: 11.1 FL (ref 8.1–13.5)
POTASSIUM SERPL-SCNC: 3.9 MMOL/L (ref 3.7–5.3)
RBC # BLD: 3.42 M/UL (ref 3.95–5.11)
RBC # BLD: ABNORMAL 10*6/UL
SEG NEUTROPHILS: 57 % (ref 36–65)
SEGMENTED NEUTROPHILS ABSOLUTE COUNT: 3.5 K/UL (ref 1.5–8.1)
SODIUM BLD-SCNC: 142 MMOL/L (ref 135–144)
WBC # BLD: 6.2 K/UL (ref 3.5–11.3)
WBC # BLD: ABNORMAL 10*3/UL

## 2020-02-04 PROCEDURE — 99232 SBSQ HOSP IP/OBS MODERATE 35: CPT | Performed by: INTERNAL MEDICINE

## 2020-02-04 PROCEDURE — 2580000003 HC RX 258: Performed by: STUDENT IN AN ORGANIZED HEALTH CARE EDUCATION/TRAINING PROGRAM

## 2020-02-04 PROCEDURE — 86140 C-REACTIVE PROTEIN: CPT

## 2020-02-04 PROCEDURE — 6360000002 HC RX W HCPCS: Performed by: STUDENT IN AN ORGANIZED HEALTH CARE EDUCATION/TRAINING PROGRAM

## 2020-02-04 PROCEDURE — 6370000000 HC RX 637 (ALT 250 FOR IP): Performed by: STUDENT IN AN ORGANIZED HEALTH CARE EDUCATION/TRAINING PROGRAM

## 2020-02-04 PROCEDURE — 80048 BASIC METABOLIC PNL TOTAL CA: CPT

## 2020-02-04 PROCEDURE — 76937 US GUIDE VASCULAR ACCESS: CPT

## 2020-02-04 PROCEDURE — 82947 ASSAY GLUCOSE BLOOD QUANT: CPT

## 2020-02-04 PROCEDURE — 36415 COLL VENOUS BLD VENIPUNCTURE: CPT

## 2020-02-04 PROCEDURE — 1200000000 HC SEMI PRIVATE

## 2020-02-04 PROCEDURE — 85025 COMPLETE CBC W/AUTO DIFF WBC: CPT

## 2020-02-04 RX ADMIN — HEPARIN SODIUM 5000 UNITS: 5000 INJECTION INTRAVENOUS; SUBCUTANEOUS at 07:10

## 2020-02-04 RX ADMIN — METOPROLOL TARTRATE 50 MG: 50 TABLET, FILM COATED ORAL at 21:12

## 2020-02-04 RX ADMIN — AMLODIPINE BESYLATE 5 MG: 5 TABLET ORAL at 08:26

## 2020-02-04 RX ADMIN — ATORVASTATIN CALCIUM 20 MG: 20 TABLET, FILM COATED ORAL at 08:26

## 2020-02-04 RX ADMIN — MEROPENEM 1 G: 1 INJECTION, POWDER, FOR SOLUTION INTRAVENOUS at 08:26

## 2020-02-04 RX ADMIN — METOPROLOL TARTRATE 50 MG: 50 TABLET, FILM COATED ORAL at 08:26

## 2020-02-04 RX ADMIN — LEVOTHYROXINE SODIUM 100 MCG: 100 TABLET ORAL at 08:26

## 2020-02-04 RX ADMIN — Medication 2 MG: at 21:12

## 2020-02-04 RX ADMIN — LEVETIRACETAM 500 MG: 500 TABLET, FILM COATED ORAL at 08:26

## 2020-02-04 RX ADMIN — LEVETIRACETAM 500 MG: 500 TABLET, FILM COATED ORAL at 21:12

## 2020-02-04 RX ADMIN — MEROPENEM 1 G: 1 INJECTION, POWDER, FOR SOLUTION INTRAVENOUS at 03:51

## 2020-02-04 RX ADMIN — MEROPENEM 1 G: 1 INJECTION, POWDER, FOR SOLUTION INTRAVENOUS at 17:28

## 2020-02-04 RX ADMIN — HEPARIN SODIUM 5000 UNITS: 5000 INJECTION INTRAVENOUS; SUBCUTANEOUS at 21:12

## 2020-02-04 RX ADMIN — HEPARIN SODIUM 5000 UNITS: 5000 INJECTION INTRAVENOUS; SUBCUTANEOUS at 15:01

## 2020-02-04 RX ADMIN — Medication 81 MG: at 08:26

## 2020-02-04 ASSESSMENT — ENCOUNTER SYMPTOMS
ABDOMINAL DISTENTION: 0
BACK PAIN: 0
COLOR CHANGE: 0
EYE DISCHARGE: 1
SORE THROAT: 0
APNEA: 0
ABDOMINAL PAIN: 1

## 2020-02-04 ASSESSMENT — PAIN SCALES - GENERAL: PAINLEVEL_OUTOF10: 0

## 2020-02-04 NOTE — PROGRESS NOTES
Hypertension     Hypothyroid     Multiple gastric polyps     Obesity     Positive FIT (fecal immunochemical test)     Seizure (HCC)     Tubular adenoma of colon     Unspecified cerebral artery occlusion with cerebral infarction     Unspecified diseases of blood and blood-forming organs     Blood infections       Past Surgical  History:     Past Surgical History:   Procedure Laterality Date    APPENDECTOMY      CHOLECYSTECTOMY      COLONOSCOPY  03/20/2017    ONE 6 mm cecal polyp and few diverticula of L colon - path: tubular adenoma    HYSTERECTOMY      RI COLONOSCOPY W/BIOPSY SINGLE/MULTIPLE N/A 3/20/2017    COLONOSCOPY with cold biopsy and photos performed by Deandre Bhakta MD at [address] [place] [address] EGD TRANSORAL BIOPSY SINGLE/MULTIPLE N/A 3/20/2017    EGD ESOPHAGOGASTRODUODENOSCOPY with cold biopsy and photos performed by Deandre Bhakta MD at [facility]  03/20/2017    3 gastric polyps (4-6 mm) polyps were erythematous and edematous with superficial hemorrhage.  PATH: hyperplastic polyp       Medications:      meropenem  1 g Intravenous Q8H    melatonin  3 mg Oral Once    sodium chloride flush  10 mL Intravenous 2 times per day    heparin (porcine)  5,000 Units Subcutaneous 3 times per day    aspirin  81 mg Oral Daily    atorvastatin  20 mg Oral Daily    [Held by provider] insulin glargine  5 Units Subcutaneous Nightly    insulin lispro  0-6 Units Subcutaneous TID WC    insulin lispro  0-3 Units Subcutaneous Nightly    levothyroxine  100 mcg Oral Daily    levETIRAcetam  500 mg Oral BID    metoprolol tartrate  50 mg Oral BID    amLODIPine  5 mg Oral Daily       Social History:     Social History     Socioeconomic History    Marital status:      Spouse name: Not on file    Number of children: Not on file    Years of education: Not on file    Highest education level: Not on file   Occupational History    Not on file   Social Needs

## 2020-02-04 NOTE — PROGRESS NOTES
[facility]  Internal Medicine Teaching Residency Program  Inpatient Daily Progress Note  ______________________________________________________________________________    Patient: Burnice Sandhoff  YOB: 1945   P:[id number]    Acct: [de-identified]     Room: 2020/2020-01  Admit date: 1/30/2020  Today's date: 02/04/20  Number of days in the hospital: 5    SUBJECTIVE     Admitting Diagnosis: Acute encephalopathy    CC: AMS    Patient seen and examined bedside. Labs and chart reviewed. No acute overnight events. Mentation: Patient's mentation is much better today. Awake, alert and oriented x3. No confusion. No apparent distress. ABG shows PCO2 49.6, bicarb 31. 6.  pH 7.412. CT abdomen pelvis performed yesterday, showed no renal or ureteral calculus. Cortical atrophic left kidney. Continuing on IV meropenem for ESBL UTI. MRI brain pending. Vital and hemodynamically stable. ROS:  Constitutional:  negative for chills, fevers, sweats  Respiratory:  negative for cough, dyspnea on exertion, hemoptysis, shortness of breath, wheezing  Cardiovascular:  negative for chest pain, chest pressure/discomfort, lower extremity edema, palpitations  Gastrointestinal:  negative for abdominal pain, constipation, diarrhea, nausea, vomiting  Neurological:  negative for dizziness, headache    BRIEF HISTORY     The patient is a pleasant 66 y.o. female presents with a chief complaint of persistent altered mentation. Patient was recently seen at [facility] and transported back to [address] [place] [address] at the facility staff noticed that patient never fully returned to baseline. Poor historian, unable to give any history. In the ED, vitals /72, HR 80s, afebrile, 2 L NC saturating 94%. BMP significant for CR 1.17, at baseline, , proBNP 3,400, troponin 32>32. TSH 8.51, free thyroxine 1.98 elevated. Keppra levels within normal limits.   WBC trended down from last admission. Hemoglobin stable. UA cloudy, LE positive, few RBCs, with many epithelial cells. CXR unremarkable. CT chest without contrast:  No new consolidation or infiltrates. CT head unremarkable. Patient was given 1 dose IV Rocephin, 1 dose of IM Haldol, blood culture sent. 500 mL fluid bolus was given.     On our evaluation, pt alert, awake, not oriented x3. Patient did not have any slurred speech or difficulty speaking. But unable to answer or unable to comprehend most questions.  Patient denies any pain or any complaints specifically denies headache, chest pain, difficulty breathing, abdominal pain. OBJECTIVE     Vital Signs:  BP (!) 125/56   Pulse 69   Temp 98.1 °F (36.7 °C) (Oral)   Resp 17   Ht 5' 2\" (1.575 m)   Wt 255 lb 15.3 oz (116.1 kg)   SpO2 98%   BMI 46.81 kg/m²     Temp (24hrs), Av.9 °F (36.6 °C), Min:97.7 °F (36.5 °C), Max:98.1 °F (36.7 °C)    In: 286   Out: 400 [Urine:400]    Physical Exam:  Constitutional: This is a well developed, well nourished, Greater than 40 - Morbid Obesity / Extreme Obesity / Grade III 66y.o. year old femalewho is alert, oriented, cooperative and in no apparent distress. Head: normocephalic and atraumatic. EENT:  PERRLA. Neck: Supple without thyromegaly. No elevated JVP. Trachea was midline. Respiratory: Chest was symmetrical.  Breath sounds bilaterally were diminished to auscultation. There were no wheezes, rhonchi or rales. There is no intercostal retraction or use of accessory muscles. Cardiovascular: Regular without murmur, or clicks. No added sounds. Abdomen: Slightly rounded and soft without organomegaly. No rebound, rigidity or guarding was appreciated. Extremities:  No lower extremity edema. No ulcerations, tenderness, varicosities or erythema. Skin:  Warm and dry. Neurological/Psychiatric: No gross motor or sensory deficits.     Medications:  Scheduled Medications:    meropenem  1 g Intravenous Q8H   

## 2020-02-05 LAB
ABSOLUTE EOS #: 0.66 K/UL (ref 0–0.44)
ABSOLUTE IMMATURE GRANULOCYTE: 0.06 K/UL (ref 0–0.3)
ABSOLUTE LYMPH #: 1.67 K/UL (ref 1.1–3.7)
ABSOLUTE MONO #: 0.8 K/UL (ref 0.1–1.2)
ANION GAP SERPL CALCULATED.3IONS-SCNC: 11 MMOL/L (ref 9–17)
ANION GAP SERPL CALCULATED.3IONS-SCNC: 14 MMOL/L (ref 9–17)
BASOPHILS # BLD: 1 % (ref 0–2)
BASOPHILS ABSOLUTE: 0.04 K/UL (ref 0–0.2)
BUN BLDV-MCNC: 9 MG/DL (ref 8–23)
BUN BLDV-MCNC: 9 MG/DL (ref 8–23)
BUN/CREAT BLD: ABNORMAL (ref 9–20)
BUN/CREAT BLD: ABNORMAL (ref 9–20)
CALCIUM SERPL-MCNC: 9 MG/DL (ref 8.6–10.4)
CALCIUM SERPL-MCNC: 9.1 MG/DL (ref 8.6–10.4)
CHLORIDE BLD-SCNC: 100 MMOL/L (ref 98–107)
CHLORIDE BLD-SCNC: 102 MMOL/L (ref 98–107)
CO2: 25 MMOL/L (ref 20–31)
CO2: 28 MMOL/L (ref 20–31)
CREAT SERPL-MCNC: 0.69 MG/DL (ref 0.5–0.9)
CREAT SERPL-MCNC: 0.79 MG/DL (ref 0.5–0.9)
CULTURE: NORMAL
CULTURE: NORMAL
DIFFERENTIAL TYPE: ABNORMAL
EOSINOPHILS RELATIVE PERCENT: 10 % (ref 1–4)
GFR AFRICAN AMERICAN: >60 ML/MIN
GFR AFRICAN AMERICAN: >60 ML/MIN
GFR NON-AFRICAN AMERICAN: >60 ML/MIN
GFR NON-AFRICAN AMERICAN: >60 ML/MIN
GFR SERPL CREATININE-BSD FRML MDRD: ABNORMAL ML/MIN/{1.73_M2}
GLUCOSE BLD-MCNC: 101 MG/DL (ref 70–99)
GLUCOSE BLD-MCNC: 102 MG/DL (ref 65–105)
GLUCOSE BLD-MCNC: 105 MG/DL (ref 65–105)
GLUCOSE BLD-MCNC: 99 MG/DL (ref 65–105)
GLUCOSE BLD-MCNC: 99 MG/DL (ref 70–99)
HCT VFR BLD CALC: 38 % (ref 36.3–47.1)
HEMOGLOBIN: 11.9 G/DL (ref 11.9–15.1)
IMMATURE GRANULOCYTES: 1 %
LYMPHOCYTES # BLD: 24 % (ref 24–43)
Lab: NORMAL
Lab: NORMAL
MAGNESIUM: 2 MG/DL (ref 1.6–2.6)
MCH RBC QN AUTO: 29.3 PG (ref 25.2–33.5)
MCHC RBC AUTO-ENTMCNC: 31.3 G/DL (ref 28.4–34.8)
MCV RBC AUTO: 93.6 FL (ref 82.6–102.9)
MONOCYTES # BLD: 12 % (ref 3–12)
NRBC AUTOMATED: 0 PER 100 WBC
PDW BLD-RTO: 13.9 % (ref 11.8–14.4)
PLATELET # BLD: 188 K/UL (ref 138–453)
PLATELET ESTIMATE: ABNORMAL
PMV BLD AUTO: 11.2 FL (ref 8.1–13.5)
POTASSIUM SERPL-SCNC: 3.6 MMOL/L (ref 3.7–5.3)
POTASSIUM SERPL-SCNC: 4.1 MMOL/L (ref 3.7–5.3)
RBC # BLD: 4.06 M/UL (ref 3.95–5.11)
RBC # BLD: ABNORMAL 10*6/UL
SEG NEUTROPHILS: 52 % (ref 36–65)
SEGMENTED NEUTROPHILS ABSOLUTE COUNT: 3.64 K/UL (ref 1.5–8.1)
SODIUM BLD-SCNC: 139 MMOL/L (ref 135–144)
SODIUM BLD-SCNC: 141 MMOL/L (ref 135–144)
SPECIMEN DESCRIPTION: NORMAL
SPECIMEN DESCRIPTION: NORMAL
WBC # BLD: 6.9 K/UL (ref 3.5–11.3)
WBC # BLD: ABNORMAL 10*3/UL

## 2020-02-05 PROCEDURE — 99232 SBSQ HOSP IP/OBS MODERATE 35: CPT | Performed by: INTERNAL MEDICINE

## 2020-02-05 PROCEDURE — 6370000000 HC RX 637 (ALT 250 FOR IP): Performed by: STUDENT IN AN ORGANIZED HEALTH CARE EDUCATION/TRAINING PROGRAM

## 2020-02-05 PROCEDURE — 85025 COMPLETE CBC W/AUTO DIFF WBC: CPT

## 2020-02-05 PROCEDURE — 80048 BASIC METABOLIC PNL TOTAL CA: CPT

## 2020-02-05 PROCEDURE — 6360000002 HC RX W HCPCS: Performed by: STUDENT IN AN ORGANIZED HEALTH CARE EDUCATION/TRAINING PROGRAM

## 2020-02-05 PROCEDURE — 1200000000 HC SEMI PRIVATE

## 2020-02-05 PROCEDURE — 83735 ASSAY OF MAGNESIUM: CPT

## 2020-02-05 PROCEDURE — 2580000003 HC RX 258: Performed by: STUDENT IN AN ORGANIZED HEALTH CARE EDUCATION/TRAINING PROGRAM

## 2020-02-05 PROCEDURE — 2700000000 HC OXYGEN THERAPY PER DAY

## 2020-02-05 PROCEDURE — 82947 ASSAY GLUCOSE BLOOD QUANT: CPT

## 2020-02-05 PROCEDURE — 93005 ELECTROCARDIOGRAM TRACING: CPT | Performed by: STUDENT IN AN ORGANIZED HEALTH CARE EDUCATION/TRAINING PROGRAM

## 2020-02-05 PROCEDURE — 36415 COLL VENOUS BLD VENIPUNCTURE: CPT

## 2020-02-05 RX ORDER — ZOLPIDEM TARTRATE 5 MG/1
5 TABLET ORAL ONCE
Status: COMPLETED | OUTPATIENT
Start: 2020-02-05 | End: 2020-02-05

## 2020-02-05 RX ORDER — LOSARTAN POTASSIUM 25 MG/1
25 TABLET ORAL DAILY
Status: DISCONTINUED | OUTPATIENT
Start: 2020-02-05 | End: 2020-02-06 | Stop reason: HOSPADM

## 2020-02-05 RX ORDER — FUROSEMIDE 20 MG/1
20 TABLET ORAL DAILY
Qty: 30 TABLET | Refills: 2 | Status: SHIPPED | OUTPATIENT
Start: 2020-02-05

## 2020-02-05 RX ORDER — AMLODIPINE BESYLATE 10 MG/1
10 TABLET ORAL DAILY
Qty: 30 TABLET | Refills: 3 | Status: SHIPPED | OUTPATIENT
Start: 2020-02-06

## 2020-02-05 RX ORDER — LORAZEPAM 1 MG/1
1 TABLET ORAL
Status: ACTIVE | OUTPATIENT
Start: 2020-02-05 | End: 2020-02-05

## 2020-02-05 RX ORDER — AMLODIPINE BESYLATE 10 MG/1
10 TABLET ORAL DAILY
Status: DISCONTINUED | OUTPATIENT
Start: 2020-02-06 | End: 2020-02-06 | Stop reason: HOSPADM

## 2020-02-05 RX ADMIN — Medication 2 MG: at 21:50

## 2020-02-05 RX ADMIN — LEVETIRACETAM 500 MG: 500 TABLET, FILM COATED ORAL at 08:15

## 2020-02-05 RX ADMIN — LEVOTHYROXINE SODIUM 100 MCG: 100 TABLET ORAL at 08:15

## 2020-02-05 RX ADMIN — LEVETIRACETAM 500 MG: 500 TABLET, FILM COATED ORAL at 21:50

## 2020-02-05 RX ADMIN — MEROPENEM 1 G: 1 INJECTION, POWDER, FOR SOLUTION INTRAVENOUS at 22:32

## 2020-02-05 RX ADMIN — METOPROLOL TARTRATE 50 MG: 50 TABLET, FILM COATED ORAL at 21:50

## 2020-02-05 RX ADMIN — LOSARTAN POTASSIUM 25 MG: 25 TABLET, FILM COATED ORAL at 12:42

## 2020-02-05 RX ADMIN — ATORVASTATIN CALCIUM 20 MG: 20 TABLET, FILM COATED ORAL at 08:15

## 2020-02-05 RX ADMIN — MEROPENEM 1 G: 1 INJECTION, POWDER, FOR SOLUTION INTRAVENOUS at 12:40

## 2020-02-05 RX ADMIN — ZOLPIDEM TARTRATE 5 MG: 5 TABLET ORAL at 05:02

## 2020-02-05 RX ADMIN — HEPARIN SODIUM 5000 UNITS: 5000 INJECTION INTRAVENOUS; SUBCUTANEOUS at 21:51

## 2020-02-05 RX ADMIN — METOPROLOL TARTRATE 50 MG: 50 TABLET, FILM COATED ORAL at 08:15

## 2020-02-05 RX ADMIN — Medication 81 MG: at 08:15

## 2020-02-05 RX ADMIN — MEROPENEM 1 G: 1 INJECTION, POWDER, FOR SOLUTION INTRAVENOUS at 01:27

## 2020-02-05 RX ADMIN — AMLODIPINE BESYLATE 5 MG: 5 TABLET ORAL at 08:14

## 2020-02-05 RX ADMIN — HEPARIN SODIUM 5000 UNITS: 5000 INJECTION INTRAVENOUS; SUBCUTANEOUS at 08:10

## 2020-02-05 ASSESSMENT — ENCOUNTER SYMPTOMS
FACIAL SWELLING: 0
COLOR CHANGE: 0
APNEA: 0
EYE REDNESS: 0
CHEST TIGHTNESS: 0
ABDOMINAL PAIN: 1
CONSTIPATION: 0

## 2020-02-05 NOTE — DISCHARGE INSTR - COC
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    Seizure ([facility]) R56.9    Thyroid disease E07.9    Arthritis M19.90    Anxiety F41.9    Coronary atherosclerosis I25.10    Depression F32.9    Hypocalcemia E83.51    Shock ([facility]) R57.9    Rash R21    Pneumonia due to organism J18.9    Guaiac positive stools R19.5    Occult blood in stools R19.5    Multiple gastric polyps K31.7    Colon polyp K63.5    Hyperplastic polyp of stomach K31.7    Tubular adenoma of colon E24.4    Complicated UTI (urinary tract infection) N39.0    Pneumonia of both lungs due to Mycoplasma pneumoniae J15.7    ANDREINA (obstructive sleep apnea) G47.33    Obesity hypoventilation syndrome ([facility]) E66.2    Dysuria R30.0    Herpes 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    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    Acute kidney injury superimposed on CKD ([facility]) N17.9, N18.9    Delirium due to general medical condition H06    Metabolic encephalopathy S98.43    Moderate malnutrition ([facility]) E44.0    Altered mental status R41.82    Acute encephalopathy G93.40    Recurrent UTI N39.0    Type 2 diabetes mellitus, with long-term current use of insulin ([facility]) E11.9, Z79.4    UTI due to extended-spectrum beta lactamase (ESBL) producing Escherichia coli N39.0, B96.29, Z16.12       Isolation/Infection:   Isolation          Contact        Patient Infection Status     Infection Onset Added Last Indicated Last Indicated By Review Planned Expiration Resolved Resolved By    ESBL (Extended Spectrum Beta Lactamase) 01/30/20 02/01/20 01/30/20 Urine Culture        MRSA 06/27/19 06/28/19 06/27/19 MRSA DNA Probe, Nasal              Nurse Assessment:  Last Vital Signs: BP (!) 163/81   Pulse 71   Temp 97.5 °F (36.4 °C) (Oral)   Resp 21   Ht 5' 2\" (1.575 m)   Wt 256 lb 13.4 oz (116.5 kg)   SpO2 97%   BMI 46.98 kg/m²     Last documented pain score (0-10 scale): Pain Level: 1/31/2020 10:55 AM   Wound Pressure Stage  2 1/31/2020 10:55 AM   Dressing Status Other (Comment) 2/4/2020  8:00 AM   Dressing Changed Other (Comment) 2/4/2020  8:00 AM   Dressing/Treatment Protective barrier 2/4/2020  8:00 AM   Dressing Change Due 01/31/20 1/31/2020 10:55 AM   Wound Length (cm) 4 cm 1/31/2020 10:55 AM   Wound Width (cm) 3 cm 1/31/2020 10:55 AM   Wound Depth (cm) 0.1 cm 1/31/2020 10:55 AM   Wound Surface Area (cm^2) 12 cm^2 1/31/2020 10:55 AM   Wound Volume (cm^3) 1.2 cm^3 1/31/2020 10:55 AM   Wound Assessment Pink;Red 2/4/2020  8:00 AM   Drainage Amount None 2/3/2020  8:00 PM   Drainage Description Serosanguinous 1/31/2020  4:00 PM   Odor None 2/3/2020  8:00 PM   Alethea-wound Assessment Blanchable erythema 2/3/2020  8:00 PM   [place]%Wound Bed 10 1/31/2020 10:55 AM   Red%Wound Bed 90 1/31/2020 10:55 AM   Number of days: 5       Wound 01/31/20 Buttocks Left (Active)   Wound Image   1/31/2020 10:55 AM   Wound Pressure Stage  2 1/31/2020 10:55 AM   Dressing Status Other (Comment) 2/4/2020  8:00 AM   Dressing Changed Other (Comment) 2/4/2020  8:00 AM   Dressing/Treatment Protective barrier 2/4/2020  8:00 AM   Wound Cleansed Other (Comment) 2/4/2020  8:00 AM   Dressing Change Due 02/02/20 2/1/2020  7:40 PM   Wound Length (cm) 8.5 cm 1/31/2020 10:55 AM   Wound Width (cm) 4 cm 1/31/2020 10:55 AM   Wound Depth (cm) 0.1 cm 1/31/2020 10:55 AM   Wound Surface Area (cm^2) 34 cm^2 1/31/2020 10:55 AM   Wound Volume (cm^3) 3.4 cm^3 1/31/2020 10:55 AM   Wound Assessment Pink;Fragile 2/3/2020  8:00 PM   Drainage Amount None 2/3/2020  8:00 PM   Drainage Description Serous 1/31/2020  4:00 PM   Odor None 2/3/2020  8:00 PM   Alethea-wound Assessment Dry; Intact; Pink 2/3/2020  8:00 PM   [place]%Wound Bed 100 1/31/2020 10:55 AM   Number of days: 5        Elimination:  Continence:   · Bowel: No  · Bladder: No  Urinary Catheter: None   Colostomy/Ileostomy/Ileal Conduit: No       Date of Last BM: 2/5/2020    Intake/Output Summary (Last Fair    Recommended Labs or Other Treatments After Discharge:  Monitor vitals, blood sugar monitoring  Follow with psych  PT/OT  Oxygen as needed for respiratory distress. Needs to follow-up with cardiology as outpatient. Physician Certification: I cert[facility] the [facility] information [facility]  [facility] necessary for the continuing treatment of the diagnosis listed and that she requires Nolberto Hendersonuel for greater 30 days.      Update Admission H&P: Changes in H&P as follows - see discharge summary    PHYSICIAN SIGNATURE:  Electronically signed by Hayley Edwards MD on 2/6/20 at 9:39 AM

## 2020-02-05 NOTE — CARE COORDINATION
Patient/family seen: No family at bedside, pt is confused and yelling       Informed patient/family of [facility]PCI-A Medical Bundle Program with potential outreach by either Care Transitions Team or [facility] Team based on hospital admission and location. BPCI-A Notification Letter given: No: Pt is confused and yelling, not appropriate to give letter to. No family at bedside.          Current discharge plan: return [facility]

## 2020-02-05 NOTE — PROGRESS NOTES
[facility] -   Infectious diseases evaluation  admission date 1/30/2020    reason for consultation:   UTI ESBL    Impression :   Current:  · complicated ESBL E COLI UTI, resolved  · Urine retention- neurogenic bladder, improving  · Leukocytosis, resolved  · Acute mental status changes- ongoing    Discussion / summary of stay / plan of care   · Presents w persistent mental status changes and leukocytosis - no fever  · U cx by SC ESBL E coli -    · Treated as a UTI but did not improve mentally  · c/o left flank pain -   · last US kid showed no hydro 1/25,   · CT showed stable atrophic left kidney, negative stones 2/3  · lethargy and confusion continues, seems to be relatively stable with minimal improvement  · CT brain neg  · Strongly doubt this is from the UTI, or from the meropenem  ·   Recommendations   · Shorten course to 5 days of meropenem, discontinue after today (2/5/20)  · Doubt meropenem is causing the AMS because she came twice for this same AMS that never changed.   · keep SC on off as needed to empty the bladder  · Will follow - disc w Dr Bill Gomez    Infection Control Recommendations   · [place] Precautions  · Contact Isolation       Antimicrobial Stewardship Recommendations   · Simplification of therapy  · Targeted therapy      Coordination ofOutpatient Care:   · Estimated Length of IV antimicrobials: 0 days  · Patient will need Midline / picc Catheter Insertion: No  · Patient will need SNF: Yes  · Patient will need outpatient wound care: No    History of Present Illness:   Initial history:  Edi George is a 66y.o.-year-old female recently seen at [facility] w AMS and  KIMBER and disch back to [facility] - they noticed her mentation was not back to normal and sent her back to [facility]  Here was w AMS and some leukocytosis and urine retention where she does not empty her bladder - she is on a q shift bladder scan with external collection device   U cx showed ESBL E coli and started on meropenem and ID consulted. She looks better but still confused and WBC dropped to normal    Clinically no cough and not SOB - lungs clear- continued to be confused per nurse  Denies abdominal pain-  Last imaging of the kid was US 1/25 no hydronephrosis, atrophic left kidney  CT ABD showed stable atropic left kidney, diverticulosis       Interval changes  2/5/2020   Afebrile. PO intake has improved. Continues to be confused. States she is feeling much better. UOP increasing. +BM. Denies nausea, vomiting diarrhea. Last dose of meropenum today        Summary of relevant labs:  Labs:  WBC 14-8 -10, 8.8, 6.2 -6.9  Creat 0.96  Micro:  BC neg 1/30  U cx ESBL E coli 1/30  Imaging:  CT AP 2/3/20  No renal or ureteral calculus. 2. Atrophic cortically atrophied left kidney, stable. 3. Cholecystectomy. 4. Diverticulosis without diverticulitis. CT chest 1/30 diffuse mosaic of the lungs, can be bronchiolitis or hypersensitivity pneumonitis. Or related to P Art HTN. CXR 1/30 no infiltrates    CT head 1/30 small vessel disease     CT ABD 2/3 No renal calculus. Diverticulosis without diverticulitis       I have personally reviewed the past medical history, past surgical history, medications, social history, and family history, and I haveupdated the database accordingly.   Past Medical History:     Past Medical History:   Diagnosis Date    AMF (acute myelofibrosis) (HCC)     Anxiety     ARF (acute renal failure) (HCC)     Arthritis     CAD (coronary artery disease)     Cancer of tonsil (HCC)     throat CA    Colon polyp     COPD (chronic obstructive pulmonary disease) (HCC)     Dehydration     Depression     Diabetes mellitus type 2, controlled ([address]     DJD (degenerative joint disease)     Encephalopathy     Examination of participant in clinical trial 01/02/2015    Study completed 02/04/2015    GERD (gastroesophageal reflux disease)     Guaiac positive stools     Herpes     name: Not on file    Number of children: Not on file    Years of education: Not on file    Highest education level: Not on file   Occupational History    Not on file   Social Needs    Financial resource strain: Not on file    Food insecurity:     Worry: Not on file     Inability: Not on file    Transportation needs:     Medical: Not on file     Non-medical: Not on file   Tobacco Use    Smoking status: Never Smoker    Smokeless tobacco: Never Used   Substance and Sexual Activity    Alcohol use: No    Drug use: No    Sexual activity: Never   Lifestyle    Physical activity:     Days per week: Not on file     Minutes per session: Not on file    Stress: Not on file   Relationships    Social connections:     Talks on phone: Not on file     Gets together: Not on file     Attends [employer] service: Not on file     Active member of club or organization: Not on file     Attends meetings of clubs or organizations: Not on file     Relationship status: Not on file    Intimate partner violence:     Fear of current or ex partner: Not on file     Emotionally abused: Not on file     Physically abused: Not on file     Forced sexual activity: Not on file   Other Topics Concern    Not on file   Social History Narrative    Not on file       Family History:   History reviewed. No pertinent family history. Allergies:   Dye [iodides]; Ioxaglate; Vancomycin; Ciprofloxacin; Eggs or egg-derived products; Mushroom extract complex; and Sulfa antibiotics     Review of Systems:     Review of Systems   Constitutional: Negative for chills and diaphoresis. HENT: Negative for congestion and facial swelling. Eyes: Negative for redness. Respiratory: Negative for apnea and chest tightness. Cardiovascular: Negative for chest pain and palpitations. Gastrointestinal: Positive for abdominal pain. Negative for constipation. Endocrine: Negative for heat intolerance.    Genitourinary: Negative for difficulty urinating and dysuria. Musculoskeletal: Negative for arthralgias and joint swelling. Skin: Negative for color change. Allergic/Immunologic: Negative for immunocompromised state. Neurological: Negative for dizziness, light-headedness and headaches. Hematological: Negative for adenopathy. Psychiatric/Behavioral: Positive for confusion. Negative for agitation. Lethargy       Physical Examination :     Patient Vitals for the past 24 hrs:   BP Temp Temp src Pulse Resp SpO2 Weight   02/05/20 0810 (!) 163/81 -- -- 71 -- -- --   02/05/20 0800 -- -- -- -- -- -- 256 lb 13.4 oz (116.5 kg)   02/04/20 2234 (!) 156/47 -- -- 65 21 -- --   02/04/20 2020 (!) 194/69 97.5 °F (36.4 °C) Oral 76 16 -- --   02/04/20 0824 (!) 159/58 97.7 °F (36.5 °C) Oral 66 -- 97 % --         Physical Exam  Constitutional:       General: She is not in acute distress. Appearance: She is obese. She is not ill-appearing or diaphoretic. HENT:      Head: Normocephalic and atraumatic. Nose: Nose normal. No congestion. Mouth/Throat:      Mouth: Mucous membranes are moist.      Pharynx: Oropharynx is clear. Eyes:      General: No scleral icterus. Conjunctiva/sclera: Conjunctivae normal.      Pupils: Pupils are equal, round, and reactive to light. Neck:      Musculoskeletal: Normal range of motion. No neck rigidity or muscular tenderness. Cardiovascular:      Rate and Rhythm: Normal rate and regular rhythm. Heart sounds: Normal heart sounds. No murmur. No gallop. Pulmonary:      Effort: No respiratory distress. Breath sounds: Normal breath sounds. No stridor. Abdominal:      General: There is no distension. Palpations: Abdomen is soft. Tenderness: There is abdominal tenderness. There is no guarding. Genitourinary:     Comments: External urine cath in place - urine clear  Musculoskeletal:         General: Swelling present. No tenderness. Right lower leg: No edema. Left lower leg: No edema. Skin:     General: Skin is dry. Coloration: Skin is not jaundiced or pale. Findings: No rash. Neurological:      General: No focal deficit present. Mental Status: She is disoriented. Cranial Nerves: No cranial nerve deficit. Psychiatric:      Comments: Confused, Not oriented to place            Medical Decision Making:   I have independently reviewed/ordered the following labs:    CBC with Differential:   Recent Labs     02/04/20  0532 02/05/20  0516   WBC 6.2 6.9   HGB 10.1* 11.9   HCT 33.6* 38.0    188   LYMPHOPCT 18* 24   MONOPCT 10 12     BMP:  Recent Labs     02/04/20  0532 02/05/20  0516    141   K 3.9 3.6*    102   CO2 24 25   BUN 11 9   CREATININE 0.77 0.79     Hepatic Function Panel:   No results for input(s): PROT, LABALBU, BILIDIR, IBILI, BILITOT, ALKPHOS, ALT, AST in the last 72 hours. No results for input(s): RPR in the last 72 hours. No results for input(s): HIV in the last 72 hours. No results for input(s): BC in the last 72 hours. Lab Results   Component Value Date    CREATININE 0.79 02/05/2020    GLUCOSE 99 02/05/2020       Detailed results: Thank you for allowing us to participate in the care of this patient. Please call with questions. Letta Dakin MS4  Office: [phone]  Perfect serve / office [phone]    I have discussed the care of the patient, including pertinent history and exam findings,  with the student- I have seen and examined the patient and the key elements of all parts of the encounter have been performed by me. I agree with the assessment, plan and orders as documented by the student.     Josirenny Castro, Infectious Diseases

## 2020-02-05 NOTE — CARE COORDINATION
Received call from Anu at [facility], requests updated clinicals to be faxed. Faxed recent clinicals to [facility] [phone].

## 2020-02-05 NOTE — PROGRESS NOTES
Units Subcutaneous Nightly    insulin lispro  0-6 Units Subcutaneous TID WC    insulin lispro  0-3 Units Subcutaneous Nightly    levothyroxine  100 mcg Oral Daily    levETIRAcetam  500 mg Oral BID    metoprolol tartrate  50 mg Oral BID       PRN Medicationsmelatonin, 2 mg, Nightly PRN  acetaminophen, 650 mg, Q4H PRN  sodium chloride flush, 10 mL, PRN  magnesium hydroxide, 30 mL, Daily PRN  ondansetron, 4 mg, Q6H PRN  glucose, 15 g, PRN  dextrose, 12.5 g, PRN  glucagon (rDNA), 1 mg, PRN  dextrose, 100 mL/hr, PRN        Diagnostic Labs and Imaging:  CBC:  Recent Labs     02/03/20  0531 02/04/20  0532 02/05/20  0516   WBC 8.8 6.2 6.9   HGB 11.2* 10.1* 11.9    169 188     BMP:   Recent Labs     02/03/20  0531 02/04/20  0532 02/05/20  0516    142 141   K 3.8 3.9 3.6*    106 102   CO2 26 24 25   BUN 12 11 9   CREATININE 0.96* 0.77 0.79   GLUCOSE 96 98 99     Hepatic: No results for input(s): AST, ALT, ALB, BILITOT, ALKPHOS in the last 72 hours. Assessment and Plan:   1. Acute encephalopathy  - Resolved  - Per family, this is pt's baseline  - Discontinue order for brain MRI    2. UTI with ESBL-producing E. coli  - Resolving  - WBC wnl  - Meropenem 1 g, day 5/5  - CT abdomen/pelvis wnl    3. HTN  - Stable, continue Norvasc 5 mg and Lopressor 50 mg BID     4. Stage 3 CKD  - Stable, Cr 0.66    5. Hypothyroidism  - Stable, continue levothyroxine 100 mcg    6. Seizure disorder  - Well-controlled  - Continue Keppra 500 mg BID    7. Type 2 DM  - Well-controlled  - Continue home insulin regimen    DVT prophylaxis: heparin 5000 units SC q8h  GI prophylaxis: N/A    Discharge planning: Discharge to [address] [facility], [facility] today. F/u with PCP within next week.     Christal Chun

## 2020-02-05 NOTE — PROGRESS NOTES
[facility]  Internal Medicine Teaching Residency Program  Inpatient Daily Progress Note  ______________________________________________________________________________    Patient: Arielle West  YOB: 1945   OXJ:[id number]    Acct: [de-identified]     Room: 2020/2020-01  Admit date: 1/30/2020  Today's date: 02/05/20  Number of days in the hospital: 6    SUBJECTIVE     Admitting Diagnosis: Acute encephalopathy    CC: AMS    Patient seen and examined bedside. Labs and chart reviewed. No acute overnight events. She is very sleepy. Can be explained, as she got 1 dose of Ambien at 5 AM in the morning. we will reassess the patient in the afternoon. Afebrile. Meropenem course shortened per ID. Last day today. Vital and hemodynamically stable. ROS:  Constitutional:  negative for chills, fevers, sweats  Respiratory:  negative for cough, dyspnea on exertion, hemoptysis, shortness of breath, wheezing  Cardiovascular:  negative for chest pain, chest pressure/discomfort, lower extremity edema, palpitations  Gastrointestinal:  negative for abdominal pain, constipation, diarrhea, nausea, vomiting  Neurological:  negative for dizziness, headache    BRIEF HISTORY     The patient is a pleasant 66 y.o. female presents with a chief complaint of persistent altered mentation. Patient was recently seen at [facility] and transported back to [facility] Linogregory Iglesias at the facility staff noticed that patient never fully returned to baseline. Poor historian, unable to give any history. In the ED, vitals /72, HR 80s, afebrile, 2 L NC saturating 94%. BMP significant for CR 1.17, at baseline, , proBNP 3,400, troponin 32>32. TSH 8.51, free thyroxine 1.98 elevated. Keppra levels within normal limits. WBC trended down from last admission. Hemoglobin stable. UA cloudy, LE positive, few RBCs, with many epithelial cells. CXR unremarkable.  CT chest without contrast:  No new consolidation or infiltrates. CT head unremarkable. Patient was given 1 dose IV Rocephin, 1 dose of IM Haldol, blood culture sent. 500 mL fluid bolus was given.     On our evaluation, pt alert, awake, not oriented x3. Patient did not have any slurred speech or difficulty speaking. But unable to answer or unable to comprehend most questions.  Patient denies any pain or any complaints specifically denies headache, chest pain, difficulty breathing, abdominal pain. OBJECTIVE     Vital Signs:  BP (!) 163/81   Pulse 71   Temp 97.5 °F (36.4 °C) (Oral)   Resp 21   Ht 5' 2\" (1.575 m)   Wt 256 lb 13.4 oz (116.5 kg)   SpO2 97%   BMI 46.98 kg/m²     Temp (24hrs), Av.5 °F (36.4 °C), Min:97.5 °F (36.4 °C), Max:97.5 °F (36.4 °C)    In: 504   Out: 1450 [Urine:1450]    Physical Exam:  Constitutional: This is a well developed, well nourished, Greater than 40 - Morbid Obesity / Extreme Obesity / Grade III 66y.o. year old femalewho is alert, oriented, cooperative and in no apparent distress. Head: normocephalic and atraumatic. EENT:  PERRLA. Neck: Supple without thyromegaly. No elevated JVP. Trachea was midline. Respiratory: Chest was symmetrical.  Breath sounds bilaterally were diminished to auscultation. There were no wheezes, rhonchi or rales. There is no intercostal retraction or use of accessory muscles. Cardiovascular: Regular without murmur, or clicks. No added sounds. Abdomen: Slightly rounded and soft without organomegaly. No rebound, rigidity or guarding was appreciated. Extremities:  No lower extremity edema. No ulcerations, tenderness, varicosities or erythema. Skin:  Warm and dry. Neurological/Psychiatric: No gross motor or sensory deficits.     Medications:  Scheduled Medications:    [START ON 2020] amLODIPine  10 mg Oral Daily    losartan  25 mg Oral Daily    meropenem  1 g Intravenous Q8H    melatonin  3 mg Oral Once    sodium chloride flush  10 mL Intravenous 2 times per day    heparin (porcine)  5,000 Units Subcutaneous 3 times per day    aspirin  81 mg Oral Daily    atorvastatin  20 mg Oral Daily    [Held by provider] insulin glargine  5 Units Subcutaneous Nightly    insulin lispro  0-6 Units Subcutaneous TID WC    insulin lispro  0-3 Units Subcutaneous Nightly    levothyroxine  100 mcg Oral Daily    levETIRAcetam  500 mg Oral BID    metoprolol tartrate  50 mg Oral BID     Continuous Infusions:    dextrose      sodium chloride 75 mL/hr at 02/03/20 0903     PRN Medicationsmelatonin, 2 mg, Nightly PRN  acetaminophen, 650 mg, Q4H PRN  sodium chloride flush, 10 mL, PRN  magnesium hydroxide, 30 mL, Daily PRN  ondansetron, 4 mg, Q6H PRN  glucose, 15 g, PRN  dextrose, 12.5 g, PRN  glucagon (rDNA), 1 mg, PRN  dextrose, 100 mL/hr, PRN      Diagnostic Labs:  CBC:   Recent Labs     02/03/20  0531 02/04/20  0532 02/05/20  0516   WBC 8.8 6.2 6.9   RBC 3.85* 3.42* 4.06   HGB 11.2* 10.1* 11.9   HCT 35.4* 33.6* 38.0   MCV 91.9 98.2 93.6   RDW 13.5 13.6 13.9    169 188     BMP:   Recent Labs     02/03/20  0531 02/04/20  0532 02/05/20  0516    142 141   K 3.8 3.9 3.6*    106 102   CO2 26 24 25   BUN 12 11 9   CREATININE 0.96* 0.77 0.79       ASSESSMENT & PLAN     1. Acute metabolic encephalopathy: Resolved. Secondary to UTI. CT abdomen pelvis showed no acute pathology. Follow-up MRI brain. 2. Recurrent UTI: Urine culture positive for ESBL. Day 5 of meropenem 1 g every 8 hours. Can be discontinued from tomorrow. ID following. 3. Hypothyroidism: Continue home dose 100 mcg levothyroxine. 4. Type 2 Diabetes: Currently under control. Lantus 5 units nightly, low-dose sliding scale. 5. Essential hypertension: Currently low normal BP. Continue Norvasc 5 mg, lopressor 50 mg BID. 6. Seizure disorder: Keppra 500 mg twice daily. 7. CKD stage III: Currently in baseline of 1.3-1.5.  8. COPD: Currently no exacerbation.   Continue breathing treatments. 9. Depression on Celexa, will resume on discharge.        DVT ppx: Heparin 5000 units SC 3 times daily   GI ppx: Not indicated  PT/OT/SW consulted  Disposition: Possible discharge today once the patient mentation improves. Reggie Barroso MD  Internal Medicine Resident, PGY-1  Warren Sanchez;  [place], [place]  2/5/2020, 1:23 PM

## 2020-02-06 VITALS
HEIGHT: 62 IN | SYSTOLIC BLOOD PRESSURE: 164 MMHG | RESPIRATION RATE: 20 BRPM | OXYGEN SATURATION: 97 % | WEIGHT: 256 LBS | BODY MASS INDEX: 47.11 KG/M2 | DIASTOLIC BLOOD PRESSURE: 87 MMHG | HEART RATE: 69 BPM | TEMPERATURE: 97.9 F

## 2020-02-06 LAB
ABSOLUTE EOS #: 0.55 K/UL (ref 0–0.44)
ABSOLUTE IMMATURE GRANULOCYTE: 0.05 K/UL (ref 0–0.3)
ABSOLUTE LYMPH #: 1.38 K/UL (ref 1.1–3.7)
ABSOLUTE MONO #: 0.64 K/UL (ref 0.1–1.2)
ANION GAP SERPL CALCULATED.3IONS-SCNC: 17 MMOL/L (ref 9–17)
BASOPHILS # BLD: 1 % (ref 0–2)
BASOPHILS ABSOLUTE: 0.04 K/UL (ref 0–0.2)
BUN BLDV-MCNC: 9 MG/DL (ref 8–23)
BUN/CREAT BLD: ABNORMAL (ref 9–20)
CALCIUM SERPL-MCNC: 8.6 MG/DL (ref 8.6–10.4)
CHLORIDE BLD-SCNC: 103 MMOL/L (ref 98–107)
CO2: 23 MMOL/L (ref 20–31)
CREAT SERPL-MCNC: 0.71 MG/DL (ref 0.5–0.9)
DIFFERENTIAL TYPE: ABNORMAL
EOSINOPHILS RELATIVE PERCENT: 10 % (ref 1–4)
GFR AFRICAN AMERICAN: >60 ML/MIN
GFR NON-AFRICAN AMERICAN: >60 ML/MIN
GFR SERPL CREATININE-BSD FRML MDRD: ABNORMAL ML/MIN/{1.73_M2}
GFR SERPL CREATININE-BSD FRML MDRD: ABNORMAL ML/MIN/{1.73_M2}
GLUCOSE BLD-MCNC: 92 MG/DL (ref 65–105)
GLUCOSE BLD-MCNC: 92 MG/DL (ref 70–99)
GLUCOSE BLD-MCNC: 96 MG/DL (ref 65–105)
HCT VFR BLD CALC: 37.7 % (ref 36.3–47.1)
HEMOGLOBIN: 11.7 G/DL (ref 11.9–15.1)
IMMATURE GRANULOCYTES: 1 %
LYMPHOCYTES # BLD: 24 % (ref 24–43)
MCH RBC QN AUTO: 29.1 PG (ref 25.2–33.5)
MCHC RBC AUTO-ENTMCNC: 31 G/DL (ref 28.4–34.8)
MCV RBC AUTO: 93.8 FL (ref 82.6–102.9)
MONOCYTES # BLD: 11 % (ref 3–12)
NRBC AUTOMATED: 0 PER 100 WBC
PDW BLD-RTO: 13.8 % (ref 11.8–14.4)
PLATELET # BLD: 156 K/UL (ref 138–453)
PLATELET ESTIMATE: ABNORMAL
PMV BLD AUTO: 11 FL (ref 8.1–13.5)
POTASSIUM SERPL-SCNC: 3.6 MMOL/L (ref 3.7–5.3)
RBC # BLD: 4.02 M/UL (ref 3.95–5.11)
RBC # BLD: ABNORMAL 10*6/UL
SEG NEUTROPHILS: 53 % (ref 36–65)
SEGMENTED NEUTROPHILS ABSOLUTE COUNT: 3.02 K/UL (ref 1.5–8.1)
SODIUM BLD-SCNC: 143 MMOL/L (ref 135–144)
WBC # BLD: 5.7 K/UL (ref 3.5–11.3)
WBC # BLD: ABNORMAL 10*3/UL

## 2020-02-06 PROCEDURE — 6360000002 HC RX W HCPCS: Performed by: STUDENT IN AN ORGANIZED HEALTH CARE EDUCATION/TRAINING PROGRAM

## 2020-02-06 PROCEDURE — 6370000000 HC RX 637 (ALT 250 FOR IP): Performed by: STUDENT IN AN ORGANIZED HEALTH CARE EDUCATION/TRAINING PROGRAM

## 2020-02-06 PROCEDURE — 36415 COLL VENOUS BLD VENIPUNCTURE: CPT

## 2020-02-06 PROCEDURE — 82947 ASSAY GLUCOSE BLOOD QUANT: CPT

## 2020-02-06 PROCEDURE — 99232 SBSQ HOSP IP/OBS MODERATE 35: CPT | Performed by: INTERNAL MEDICINE

## 2020-02-06 PROCEDURE — 80048 BASIC METABOLIC PNL TOTAL CA: CPT

## 2020-02-06 PROCEDURE — 85025 COMPLETE CBC W/AUTO DIFF WBC: CPT

## 2020-02-06 PROCEDURE — 99239 HOSP IP/OBS DSCHRG MGMT >30: CPT | Performed by: INTERNAL MEDICINE

## 2020-02-06 RX ORDER — LANOLIN ALCOHOL/MO/W.PET/CERES
CREAM (GRAM) TOPICAL 2 TIMES DAILY PRN
Status: DISCONTINUED | OUTPATIENT
Start: 2020-02-06 | End: 2020-02-06 | Stop reason: HOSPADM

## 2020-02-06 RX ORDER — MINERAL OIL/I-PROP MYR/WATER
LOTION (ML) TOPICAL 2 TIMES DAILY PRN
Status: DISCONTINUED | OUTPATIENT
Start: 2020-02-06 | End: 2020-02-06

## 2020-02-06 RX ADMIN — METOPROLOL TARTRATE 50 MG: 50 TABLET, FILM COATED ORAL at 08:33

## 2020-02-06 RX ADMIN — HEPARIN SODIUM 5000 UNITS: 5000 INJECTION INTRAVENOUS; SUBCUTANEOUS at 08:25

## 2020-02-06 RX ADMIN — LOSARTAN POTASSIUM 25 MG: 25 TABLET, FILM COATED ORAL at 08:32

## 2020-02-06 RX ADMIN — Medication 81 MG: at 08:22

## 2020-02-06 RX ADMIN — AMLODIPINE BESYLATE 10 MG: 10 TABLET ORAL at 08:32

## 2020-02-06 RX ADMIN — ATORVASTATIN CALCIUM 20 MG: 20 TABLET, FILM COATED ORAL at 08:22

## 2020-02-06 RX ADMIN — ACETAMINOPHEN 650 MG: 325 TABLET ORAL at 12:12

## 2020-02-06 RX ADMIN — LEVETIRACETAM 500 MG: 500 TABLET, FILM COATED ORAL at 08:21

## 2020-02-06 RX ADMIN — ACETAMINOPHEN 650 MG: 325 TABLET ORAL at 06:50

## 2020-02-06 RX ADMIN — LEVOTHYROXINE SODIUM 100 MCG: 100 TABLET ORAL at 08:21

## 2020-02-06 ASSESSMENT — ENCOUNTER SYMPTOMS
EYE REDNESS: 0
PHOTOPHOBIA: 0
APNEA: 0
ABDOMINAL PAIN: 1
CHEST TIGHTNESS: 0
CONSTIPATION: 0
COLOR CHANGE: 0
FACIAL SWELLING: 0

## 2020-02-06 ASSESSMENT — PAIN SCALES - GENERAL
PAINLEVEL_OUTOF10: 4
PAINLEVEL_OUTOF10: 2

## 2020-02-06 NOTE — PROGRESS NOTES
[facility] -   Infectious diseases evaluation  admission date 1/30/2020    reason for consultation:   UTI ESBL    Impression :   Current:  · complicated ESBL E COLI UTI, resolved  · Urine retention- neurogenic bladder, improving  · Leukocytosis, resolved  · Acute mental status changes- ongoing    Discussion / summary of stay / plan of care   · Presents w persistent mental status changes and leukocytosis - no fever  · U cx by SC ESBL E coli -    · Treated as a UTI but did not improve mentally  · c/o left flank pain -   · last US kid showed no hydro 1/25,   · CT showed stable atrophic left kidney, negative stones 2/3  · lethargy and confusion continues, seems to be relatively stable with minimal improvement  · CT brain neg  · Strongly doubt this is from the UTI, or from the meropenem  ·   Recommendations   · Shorten course to 5 days of meropenem, discontinue after today (2/5/20)  · Doubt meropenem is causing the AMS because she came twice for this same AMS that never changed.   · keep SC on off as needed to empty the bladder  · Will follow - disc w Dr Celina Smith    Infection Control Recommendations   · [place] Precautions  · Contact Isolation       Antimicrobial Stewardship Recommendations   · Simplification of therapy  · Targeted therapy      Coordination ofOutpatient Care:   · Estimated Length of IV antimicrobials: 0 days  · Patient will need Midline / picc Catheter Insertion: No  · Patient will need SNF: Yes  · Patient will need outpatient wound care: No    History of Present Illness:   Initial history:  Guerraharjinder Murray is a 66y.o.-year-old female recently seen at [facility] w AMS and  KIMBER and disch back to [facility] - they noticed her mentation was not back to normal and sent her back to [facility]  Here was w AMS and some leukocytosis and urine retention where she does not empty her bladder - she is on a q shift bladder scan with external collection device   U cx showed ESBL E coli children: Not on file    Years of education: Not on file    Highest education level: Not on file   Occupational History    Not on file   Social Needs    Financial resource strain: Not on file    Food insecurity:     Worry: Not on file     Inability: Not on file    Transportation needs:     Medical: Not on file     Non-medical: Not on file   Tobacco Use    Smoking status: Never Smoker    Smokeless tobacco: Never Used   Substance and Sexual Activity    Alcohol use: No    Drug use: No    Sexual activity: Never   Lifestyle    Physical activity:     Days per week: Not on file     Minutes per session: Not on file    Stress: Not on file   Relationships    Social connections:     Talks on phone: Not on file     Gets together: Not on file     Attends Restorationist service: Not on file     Active member of club or organization: Not on file     Attends meetings of clubs or organizations: Not on file     Relationship status: Not on file    Intimate partner violence:     Fear of current or ex partner: Not on file     Emotionally abused: Not on file     Physically abused: Not on file     Forced sexual activity: Not on file   Other Topics Concern    Not on file   Social History Narrative    Not on file       Family History:   History reviewed. No pertinent family history. Allergies:   Dye [iodides]; Ioxaglate; Vancomycin; Ciprofloxacin; Eggs or egg-derived products; Mushroom extract complex; and Sulfa antibiotics     Review of Systems:     Review of Systems   Constitutional: Negative for chills and diaphoresis. HENT: Negative for congestion and facial swelling. Eyes: Negative for redness. Respiratory: Negative for apnea and chest tightness. Cardiovascular: Negative for chest pain and palpitations. Gastrointestinal: Positive for abdominal pain. Negative for constipation. Endocrine: Negative for heat intolerance. Genitourinary: Negative for difficulty urinating and dysuria.    Musculoskeletal:

## 2020-02-06 NOTE — FLOWSHEET NOTE
Discharge instructions reviewed with pt/family, discussed medications, VU, denies any further questions or anxiety related to discharge. IV/tele discontinued. Pt discharged to long term care facility [facility]). Taken from room via stretcher by [employer], personal belongings taken with.

## 2020-02-06 NOTE — CARE COORDINATION
Patient/family seen: Yes       Informed patient/family of [facility] Medical Bundle Program with potential outreach by either Care Transitions Team or [facility]Health Team based on hospital admission and location.        [facility] Notification Letter given: Yes    Patient confused-letter explained to sister Frazier Cranker over the phone     Current discharge plan: return to [facility]

## 2020-02-06 NOTE — PROGRESS NOTES
Oral Once    sodium chloride flush  10 mL Intravenous 2 times per day    heparin (porcine)  5,000 Units Subcutaneous 3 times per day    aspirin  81 mg Oral Daily    atorvastatin  20 mg Oral Daily    [Held by provider] insulin glargine  5 Units Subcutaneous Nightly    insulin lispro  0-6 Units Subcutaneous TID WC    insulin lispro  0-3 Units Subcutaneous Nightly    levothyroxine  100 mcg Oral Daily    levETIRAcetam  500 mg Oral BID    metoprolol tartrate  50 mg Oral BID     Continuous Infusions:    dextrose       PRN MedicationsHydrocerin, , BID PRN  melatonin, 2 mg, Nightly PRN  acetaminophen, 650 mg, Q4H PRN  sodium chloride flush, 10 mL, PRN  magnesium hydroxide, 30 mL, Daily PRN  ondansetron, 4 mg, Q6H PRN  glucose, 15 g, PRN  dextrose, 12.5 g, PRN  glucagon (rDNA), 1 mg, PRN  dextrose, 100 mL/hr, PRN      Diagnostic Labs:  CBC:   Recent Labs     02/04/20  0532 02/05/20  0516 02/06/20  0531   WBC 6.2 6.9 5.7   RBC 3.42* 4.06 4.02   HGB 10.1* 11.9 11.7*   HCT 33.6* 38.0 37.7   MCV 98.2 93.6 93.8   RDW 13.6 13.9 13.8    188 156     BMP:   Recent Labs     02/05/20  0516 02/05/20 2128 02/06/20  0531    139 143   K 3.6* 4.1 3.6*    100 103   CO2 25 28 23   BUN 9 9 9   CREATININE 0.79 0.69 0.71       ASSESSMENT & PLAN     1. Acute metabolic encephalopathy: Resolved. Secondary to UTI. 2. Recurrent UTI: Urine culture positive for ESBL. Completed 5-day course of IV meropenem per ID recommendations. 3. Asymptomatic brief run of V. tach: Stat EKG and BMP showed no abnormalities. Electrolytes within normal limits. Follow-up with cardiology as outpatient upon discharge. 4. Hypothyroidism: Continue home dose 100 mcg levothyroxine. 5. Type 2 Diabetes: Currently under control. Lantus 5 units nightly, low-dose sliding scale. 6. Essential hypertension: Currently low normal BP. Continue Norvasc 5 mg, lopressor 50 mg BID. 7. Seizure disorder: Keppra 500 mg twice daily.   8. CKD stage III: Currently in baseline of 1.3-1.5.  9. COPD: Currently no exacerbation. Continue breathing treatments. Recommend oxygen as needed at SNF. 10. Depression on Celexa, will resume on discharge.        DVT ppx: Heparin 5000 units SC 3 times daily   GI ppx: Not indicated  PT/OT/SW consulted  Disposition: Discharged today. Abilio Christiansen MD  Internal Medicine Resident, PGY-1  [facility];  [place], [place]  2/6/2020, 1:57 PM

## 2020-02-06 NOTE — PLAN OF CARE
Problem: Risk for Impaired Skin Integrity  Goal: Tissue integrity - skin and mucous membranes  Description  Structural intactness and normal physiological function of skin and  mucous membranes.   2/6/2020 0045 by Ash Toro RN  Outcome: Ongoing  2/5/2020 1556 by Herb Miller RN  Outcome: Ongoing     Problem: Falls - Risk of:  Goal: Will remain free from falls  Description  Will remain free from falls  2/6/2020 0045 by Ash Toro RN  Outcome: Ongoing  2/5/2020 1556 by Herb Miller RN  Outcome: Ongoing  Goal: Absence of physical injury  Description  Absence of physical injury  2/6/2020 0045 by Ash Toro RN  Outcome: Ongoing  2/5/2020 1556 by Herb Miller RN  Outcome: Ongoing

## 2020-02-06 NOTE — CARE COORDINATION
Discharge [facility] Case Management Department  Written by: Winnie Knapp RN    Patient Name: Fidelia Muniz  Attending Provider: Xiang Troncoso MD  Admit Date: 2020  8:43 AM  MRN: [id number]  Account: [de-identified]                     : 1945  Discharge Date: 2020      Disposition: Vic Jennings RN

## 2020-02-07 LAB
EKG ATRIAL RATE: 66 BPM
EKG P AXIS: 64 DEGREES
EKG P-R INTERVAL: 132 MS
EKG Q-T INTERVAL: 448 MS
EKG QRS DURATION: 110 MS
EKG QTC CALCULATION (BAZETT): 469 MS
EKG R AXIS: 76 DEGREES
EKG T AXIS: 47 DEGREES
EKG VENTRICULAR RATE: 66 BPM

## 2020-02-07 PROCEDURE — 93010 ELECTROCARDIOGRAM REPORT: CPT | Performed by: INTERNAL MEDICINE

## 2020-02-07 NOTE — PROGRESS NOTES
and started on meropenem and ID consulted. She looks better but still confused and WBC dropped to normal    Clinically no cough and not SOB - lungs clear- continued to be confused per nurse  Denies abdominal pain-  Last imaging of the kid was US 1/25 no hydronephrosis, atrophic left kidney  CT ABD showed stable atropic left kidney, diverticulosis       Interval changes  2/6/2020   No fever no chills remains lethargic, confused exactly similar to before. Stopping meropenem yesterday did not seem to make a difference yet on her mentation. I suspect this is her baseline dementia, and hence I doubt this has to do with infection or with antibiotic. Labs reviewed and within range  discharge planning for today        Summary of relevant labs:  Labs:  WBC 14-8 -10, 8.8, 6.2 -6.9  Creat 0.96  Micro:  BC neg 1/30  U cx ESBL E coli 1/30  Imaging:  CT AP 2/3/20  No renal or ureteral calculus. 2. Atrophic cortically atrophied left kidney, stable. 3. Cholecystectomy. 4. Diverticulosis without diverticulitis. CT chest 1/30 diffuse mosaic of the lungs, can be bronchiolitis or hypersensitivity pneumonitis. Or related to P Art HTN. CXR 1/30 no infiltrates    CT head 1/30 small vessel disease     CT ABD 2/3 No renal calculus. Diverticulosis without diverticulitis       I have personally reviewed the past medical history, past surgical history, medications, social history, and family history, and I haveupdated the database accordingly.   Past Medical History:     Past Medical History:   Diagnosis Date    AMF (acute myelofibrosis) (HCC)     Anxiety     ARF (acute renal failure) (HCC)     Arthritis     CAD (coronary artery disease)     Cancer of tonsil (HCC)     throat CA    Colon polyp     COPD (chronic obstructive pulmonary disease) (HCC)     Dehydration     Depression     Diabetes mellitus type 2, controlled ([facility] [address]     DJD (degenerative joint disease)     Encephalopathy     Examination of participant in clinical trial 01/02/2015    Study completed 02/04/2015    GERD (gastroesophageal reflux disease)     Guaiac positive stools     Herpes     Hyperkalemia     Hyperplastic polyp of stomach     Hypertension     Hypothyroid     Multiple gastric polyps     Obesity     ANDREINA (obstructive sleep apnea)     mask intolerance. unkown if she uses at [facility] home. unable to complete PSG    Positive FIT (fecal immunochemical test)     Seizure (HCC)     Tubular adenoma of colon     Unspecified cerebral artery occlusion with cerebral infarction     Unspecified diseases of blood and blood-forming organs     Blood infections       Past Surgical  History:     Past Surgical History:   Procedure Laterality Date    APPENDECTOMY      CHOLECYSTECTOMY      COLONOSCOPY  03/20/2017    ONE 6 mm cecal polyp and few diverticula of L colon - path: tubular adenoma    HYSTERECTOMY      AR COLONOSCOPY W/BIOPSY SINGLE/MULTIPLE N/A 3/20/2017    COLONOSCOPY with cold biopsy and photos performed by Lalita Álvarez MD at [address] [place] [address] EGD TRANSORAL BIOPSY SINGLE/MULTIPLE N/A 3/20/2017    EGD ESOPHAGOGASTRODUODENOSCOPY with cold biopsy and photos performed by Lalita Álvarez MD at [place] [address]  03/20/2017    3 gastric polyps (4-6 mm) polyps were erythematous and edematous with superficial hemorrhage.  PATH: hyperplastic polyp       Medications:         Social History:     Social History     Socioeconomic History    Marital status:      Spouse name: Not on file    Number of children: Not on file    Years of education: Not on file    Highest education level: Not on file   Occupational History    Not on file   Social Needs    Financial resource strain: Not on file    Food insecurity:     Worry: Not on file     Inability: Not on file    Transportation needs:     Medical: Not on file     Non-medical: Not on file   Tobacco Use    Smoking status: Never Smoker    Smokeless Patient Vitals for the past 24 hrs:   BP Temp Temp src Pulse Resp SpO2 Weight   02/06/20 0833 -- -- -- 69 -- -- --   02/06/20 0832 (!) 164/87 -- -- -- -- -- --   02/06/20 0700 -- -- -- -- -- -- 256 lb (116.1 kg)   02/06/20 0639 (!) 153/86 97.9 °F (36.6 °C) Oral 66 20 97 % --         Physical Exam  Constitutional:       General: She is not in acute distress. Appearance: She is obese. She is not ill-appearing or diaphoretic. HENT:      Head: Normocephalic and atraumatic. Nose: Nose normal. No congestion. Mouth/Throat:      Mouth: Mucous membranes are moist.      Pharynx: Oropharynx is clear. Eyes:      General: No scleral icterus. Conjunctiva/sclera: Conjunctivae normal.      Pupils: Pupils are equal, round, and reactive to light. Neck:      Musculoskeletal: Normal range of motion and neck supple. No neck rigidity or muscular tenderness. Cardiovascular:      Rate and Rhythm: Normal rate and regular rhythm. Heart sounds: Normal heart sounds. No murmur. No gallop. Pulmonary:      Effort: No respiratory distress. Breath sounds: Normal breath sounds. No stridor. Abdominal:      General: There is no distension. Palpations: Abdomen is soft. Tenderness: There is abdominal tenderness. There is no guarding. Genitourinary:     Comments: External urine cath in place - urine clear  Musculoskeletal:         General: Swelling present. No tenderness. Right lower leg: No edema. Left lower leg: No edema. Skin:     General: Skin is dry. Coloration: Skin is not jaundiced or pale. Findings: No bruising or rash. Neurological:      General: No focal deficit present. Mental Status: She is disoriented. Cranial Nerves: No cranial nerve deficit.    Psychiatric:      Comments: Confused, Not oriented to place            Medical Decision Making:   I have independently reviewed/ordered the following labs:    CBC with Differential:   Recent Labs

## 2020-02-08 NOTE — DISCHARGE SUMMARY
[address]     Department of Internal Medicine - Staff Internal Medicine Teaching Service    INPATIENT DISCHARGE SUMMARY      Patient Identification:  Enid Golden is a 66 y.o. female. :  1945  MRN: [id number]     Acct: [de-identified]   PCP: Ruthann Galvan MD  Admit Date:  2020  Discharge date and time: 2020 12:40 PM   Attending Provider: Dr. Elvira Leal problems and management:  1. Acute metabolic encephalopathy: Resolved. Secondary to UTI. 2. Sepsis due to #3. Resolved  3. Recurrent UTI: Urine culture positive for ESBL. Completed 5-day course of IV meropenem per ID recommendations. HOSPITAL STAY     Brief Inpatient course:     Enid Golden is a 66 y.o. female who was admitted for the management of Acute encephalopathy, presented to the emergency department with a chief complaint of persistent altered mentation. Patient was recently seen at [facility] and transported back to [address] [place] [address] at the facility staff noticed that patient never fully returned to baseline. Poor historian, unable to give any history. Keppra levels within normal limits.  WBC trended down from last admission. UA cloudy, LE positive, few RBCs, with many epithelial cells.  CXR unremarkable. CT chest without contrast:  No new consolidation or infiltrates.  CT head unremarkable.     Procedures/ Significant Interventions:    None    Consults:     Consults:     Final Specialist Recommendations/Findings:   IP CONSULT TO INTERNAL MEDICINE  IP CONSULT TO CASE MANAGEMENT  IP CONSULT TO INFECTIOUS DISEASES  IP CONSULT TO IV TEAM      Any Hospital Acquired Infections: none    Discharge Functional Status:  stable    DISCHARGE PLAN     Disposition: SNF    Patient Instructions:   Discharge Medication List as of 2020 10:12 AM      START taking these medications    Details   amLODIPine (NORVASC) 10 MG tablet Take 1 tablet by mouth daily, Disp-30 tablet, R-3Normal         CONTINUE these medications which have CHANGED    Details   furosemide (LASIX) 20 MG tablet Take 1 tablet by mouth daily, Disp-30 tablet, R-2Normal         CONTINUE these medications which have NOT CHANGED    Details   guaiFENesin (MUCINEX) 600 MG extended release tablet Take 1,200 mg by mouth 2 times dailyHistorical Med      losartan (COZAAR) 25 MG tablet Take 25 mg by mouth dailyHistorical Med      ipratropium-albuterol (DUONEB) 0.5-2.5 (3) MG/3ML SOLN nebulizer solution Inhale 1 vial into the lungs every 4 hours as needed for Shortness of Breath or Other (wheezing)Historical Med      potassium chloride (MICRO-K) 10 MEQ extended release capsule Take 10 mEq by mouth 2 times dailyHistorical Med      meclizine (ANTIVERT) 25 MG tablet Take 25 mg by mouth 3 times daily as needed for DizzinessHistorical Med      omeprazole (PRILOSEC) 20 MG delayed release capsule Take 20 mg by mouth DailyHistorical Med      Estradiol 10 % CREA by Each Nostril route nightly For hormone therapyHistorical Med      ferrous sulfate 325 (65 Fe) MG tablet Take 325 mg by mouth 2 times dailyHistorical Med      loperamide (IMODIUM) 2 MG capsule Take 2 mg by mouth every 6 hours as needed for DiarrheaHistorical Med      Calcium Carb-Cholecalciferol (CALTRATE 600+D) 600-800 MG-UNIT TABS Take 1 tablet by mouth dailyHistorical Med      Dulaglutide (TRULICITY) 1.5 OE/2.9IO SOPN Inject 1 Dose into the skin once a week fridaysHistorical Med      aspirin 81 MG EC tablet Take 1 tablet by mouth daily. , Disp-30 tablet, R-3      metoprolol (LOPRESSOR) 50 MG tablet Take 1 tablet by mouth 2 times daily. , Disp-60 tablet, R-0      atorvastatin (LIPITOR) 20 MG tablet Take 20 mg by mouth daily. cetirizine (ZYRTEC) 10 MG tablet Take 10 mg by mouth daily. citalopram (CELEXA) 20 MG tablet Take 20 mg by mouth daily.       fluticasone (FLONASE) 50 MCG/ACT nasal spray 2 sprays by Nasal

## (undated) DEVICE — AIRLIFE™ NASAL OXYGEN CANNULA CURVED, FLARED TIP, WITH 7 FEET (2.1 M) CRUSH RESISTANT TUBING, OVER-THE-EAR STYLE: Brand: AIRLIFE™

## (undated) DEVICE — BITEBLOCK 54FR W/ DENT RIM BLOX

## (undated) DEVICE — SNARE ENDOSCP L240CM LOOP W13MM DIA2.4MM SHT THROW SM OVL

## (undated) DEVICE — FORCEPS BX L240CM WRK CHN 2.8MM STD CAP W/ NDL MIC MESH